# Patient Record
Sex: FEMALE | Race: WHITE | NOT HISPANIC OR LATINO | Employment: FULL TIME | URBAN - METROPOLITAN AREA
[De-identification: names, ages, dates, MRNs, and addresses within clinical notes are randomized per-mention and may not be internally consistent; named-entity substitution may affect disease eponyms.]

---

## 2017-01-27 ENCOUNTER — TELEPHONE (OUTPATIENT)
Dept: GENERAL RADIOLOGY | Facility: CLINIC | Age: 52
End: 2017-01-27

## 2017-01-27 DIAGNOSIS — K21.9 GASTROESOPHAGEAL REFLUX DISEASE WITHOUT ESOPHAGITIS: Primary | ICD-10-CM

## 2017-01-31 DIAGNOSIS — N95.1 SYMPTOMATIC MENOPAUSAL OR FEMALE CLIMACTERIC STATES: Primary | ICD-10-CM

## 2017-01-31 RX ORDER — ESTRADIOL 0.5 MG/1
0.5 TABLET ORAL DAILY
Qty: 90 TABLET | Refills: 0 | Status: SHIPPED | OUTPATIENT
Start: 2017-01-31 | End: 2017-05-10

## 2017-01-31 NOTE — TELEPHONE ENCOUNTER
Notice of denial of RABEprazole (ACIPHEX) 20 MG tablet prescription drug coverage.        [employer] plan covers this drug when the patient meets on of these conditions:   -There are no preferred formulary alternative for their condition.  -They need a form of the drug that is not on the formulary.  -They have clinical condition for which there is no formulary alternative.  -They tried the required number of preferred formulary alternative on their formulary first and the alternatives did not work for them or they could not take them.    Formulary alternatives are omeprazole, lansoprazole, omeprazole/sodium bicarbonate.

## 2017-01-31 NOTE — TELEPHONE ENCOUNTER
estradiol (ESTRACE) 0.5 MG tablet      Last Written Prescription Date: 7/5/2016  Last Fill Quantity: 90 tablet, # refills: 1  Last Office Visit with [facility], [facility] or [facility] prescribing provider: 9/13/2016       BP Readings from Last 3 Encounters:   09/13/16 118/80   06/08/16 114/78   03/28/16 120/60     Date of last Breast Exam: 2/1/2016

## 2017-01-31 NOTE — TELEPHONE ENCOUNTER
Prescription approved per [facility] Ref[place].  Tessa Langford, RN, BSN  [facility]

## 2017-02-03 NOTE — TELEPHONE ENCOUNTER
I called Brea and she is refusing to try lansoprazole. She says she has been through this before and nothing else works. She was very firm and did not seem open to trying anything else. Thu Santos R.N.

## 2017-02-06 DIAGNOSIS — E78.5 HYPERLIPIDEMIA LDL GOAL <130: Primary | ICD-10-CM

## 2017-02-06 NOTE — TELEPHONE ENCOUNTER
simvastatin (ZOCOR) 40 MG tablet     Last Written Prescription Date: 2/23/2016  Last Fill Quantity: 90 tablet, # refills: 2  Last Office Visit with [facility], [facility] or [facility] prescribing provider: 9/13/2016       CHOL      199   9/13/2016  HDL       42   9/13/2016  LDL      107   9/13/2016  TRIG      248   9/13/2016  CHOLHDLRATIO      4.7   10/7/2014

## 2017-02-07 NOTE — TELEPHONE ENCOUNTER
Please see of we can get the Aciphex PA done. Patient has tried and failed Omeprazole,Protonix, and Lanzoprazole    Karen Weiler, MD

## 2017-02-08 RX ORDER — SIMVASTATIN 40 MG
40 TABLET ORAL AT BEDTIME
Qty: 90 TABLET | Refills: 2 | Status: SHIPPED | OUTPATIENT
Start: 2017-02-08 | End: 2017-12-13

## 2017-02-08 NOTE — TELEPHONE ENCOUNTER
Prescription approved per [facility] Refill Protocol.  Meets LDL goal < 130.  Thu Santos R.N.

## 2017-02-20 NOTE — TELEPHONE ENCOUNTER
Notice of denial of 2nd request for RABEprazole (ACIPHEX) 20 MG tablet prescription drug coverage.  Therapeutic failure of omeprazole, protonix, and lanzoprazole were noted on the prior authorization request.

## 2017-03-03 ENCOUNTER — HEALTH MAINTENANCE LETTER (OUTPATIENT)
Age: 52
End: 2017-03-03

## 2017-05-10 DIAGNOSIS — E78.5 HYPERLIPIDEMIA LDL GOAL <130: ICD-10-CM

## 2017-05-10 DIAGNOSIS — N95.1 SYMPTOMATIC MENOPAUSAL OR FEMALE CLIMACTERIC STATES: ICD-10-CM

## 2017-05-10 NOTE — TELEPHONE ENCOUNTER
estradiol (ESTRACE) 0.5 MG tablet      Last Written Prescription Date: 1/31/2017  Last Fill Quantity: 90 tablet, # refills: 0  Last Office Visit with [facility], [facility] or [facility] prescribing provider: 9/13/2016       BP Readings from Last 3 Encounters:   09/13/16 118/80   06/08/16 114/78   03/28/16 120/60     Date of last Breast Exam: 2/1/2016      simvastatin (ZOCOR) 40 MG tablet  This refill has been requested too soon.  Please decline and close.

## 2017-05-11 RX ORDER — SIMVASTATIN 40 MG
TABLET ORAL
Qty: 90 TABLET | Refills: 1 | OUTPATIENT
Start: 2017-05-11

## 2017-05-11 RX ORDER — ESTRADIOL 0.5 MG/1
0.5 TABLET ORAL DAILY
Qty: 30 TABLET | Refills: 0 | Status: SHIPPED | OUTPATIENT
Start: 2017-05-11 | End: 2017-06-13

## 2017-06-13 DIAGNOSIS — N95.1 SYMPTOMATIC MENOPAUSAL OR FEMALE CLIMACTERIC STATES: ICD-10-CM

## 2017-06-13 RX ORDER — ESTRADIOL 0.5 MG/1
TABLET ORAL
Qty: 30 TABLET | Refills: 0 | Status: SHIPPED | OUTPATIENT
Start: 2017-06-13 | End: 2017-07-18

## 2017-06-13 NOTE — TELEPHONE ENCOUNTER
estradiol (ESTRACE) 0.5 MG tablet      Last Written Prescription Date: 5/11/2017  Last Fill Quantity: 30 tablet, # refills: 0  Last Office Visit with [facility], [facility] or [facility] prescribing provider: 9/13/2016       BP Readings from Last 3 Encounters:   09/13/16 118/80   06/08/16 114/78   03/28/16 120/60     Date of last Breast Exam: 2/1/2016

## 2017-06-13 NOTE — TELEPHONE ENCOUNTER
30 day supply given.  Patient is due for an OV.  Please call and assist with scheduling appointment prior to next refill   Shelby Devi RN  [employer]

## 2017-06-26 ASSESSMENT — PATIENT HEALTH QUESTIONNAIRE - PHQ9
SUM OF ALL RESPONSES TO PHQ QUESTIONS 1-9: 9
10. IF YOU CHECKED OFF ANY PROBLEMS, HOW DIFFICULT HAVE THESE PROBLEMS MADE IT FOR YOU TO DO YOUR WORK, TAKE CARE OF THINGS AT HOME, OR GET ALONG WITH OTHER PEOPLE: VERY DIFFICULT
SUM OF ALL RESPONSES TO PHQ QUESTIONS 1-9: 9

## 2017-06-27 ASSESSMENT — PATIENT HEALTH QUESTIONNAIRE - PHQ9: SUM OF ALL RESPONSES TO PHQ QUESTIONS 1-9: 9
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
## 2017-06-28 ENCOUNTER — OFFICE VISIT (OUTPATIENT)
Dept: FAMILY MEDICINE | Facility: CLINIC | Age: 52
End: 2017-06-28
Payer: COMMERCIAL

## 2017-06-28 VITALS
OXYGEN SATURATION: 95 % | HEIGHT: 67 IN | DIASTOLIC BLOOD PRESSURE: 72 MMHG | HEART RATE: 85 BPM | BODY MASS INDEX: 35.79 KG/M2 | TEMPERATURE: 98 F | SYSTOLIC BLOOD PRESSURE: 130 MMHG | WEIGHT: 228 LBS

## 2017-06-28 DIAGNOSIS — Z12.4 SCREENING FOR MALIGNANT NEOPLASM OF CERVIX: ICD-10-CM

## 2017-06-28 DIAGNOSIS — K21.9 GASTROESOPHAGEAL REFLUX DISEASE WITHOUT ESOPHAGITIS: ICD-10-CM

## 2017-06-28 DIAGNOSIS — R07.89 OTHER CHEST PAIN: ICD-10-CM

## 2017-06-28 DIAGNOSIS — Z12.31 ENCOUNTER FOR SCREENING MAMMOGRAM FOR BREAST CANCER: ICD-10-CM

## 2017-06-28 DIAGNOSIS — Z11.59 NEED FOR HEPATITIS C SCREENING TEST: ICD-10-CM

## 2017-06-28 DIAGNOSIS — E78.5 HYPERLIPIDEMIA LDL GOAL <130: ICD-10-CM

## 2017-06-28 DIAGNOSIS — Z00.00 ROUTINE GENERAL MEDICAL EXAMINATION AT A HEALTH CARE FACILITY: Primary | ICD-10-CM

## 2017-06-28 DIAGNOSIS — F33.0 MILD RECURRENT MAJOR DEPRESSION (H): ICD-10-CM

## 2017-06-28 LAB
ALBUMIN SERPL-MCNC: 3.9 G/DL (ref 3.4–5)
ALP SERPL-CCNC: 73 U/L (ref 40–150)
ALT SERPL W P-5'-P-CCNC: 25 U/L (ref 0–50)
ANION GAP SERPL CALCULATED.3IONS-SCNC: 8 MMOL/L (ref 3–14)
AST SERPL W P-5'-P-CCNC: 18 U/L (ref 0–45)
BILIRUB SERPL-MCNC: 0.4 MG/DL (ref 0.2–1.3)
BUN SERPL-MCNC: 6 MG/DL (ref 7–30)
CALCIUM SERPL-MCNC: 9.1 MG/DL (ref 8.5–10.1)
CHLORIDE SERPL-SCNC: 110 MMOL/L (ref 94–109)
CHOLEST SERPL-MCNC: 194 MG/DL
CO2 SERPL-SCNC: 24 MMOL/L (ref 20–32)
CREAT SERPL-MCNC: 0.68 MG/DL (ref 0.52–1.04)
ERYTHROCYTE [DISTWIDTH] IN BLOOD BY AUTOMATED COUNT: 13 % (ref 10–15)
GFR SERPL CREATININE-BSD FRML MDRD: ABNORMAL ML/MIN/1.7M2
GLUCOSE SERPL-MCNC: 83 MG/DL (ref 70–99)
HCT VFR BLD AUTO: 47.8 % (ref 35–47)
HDLC SERPL-MCNC: 50 MG/DL
HGB BLD-MCNC: 16.3 G/DL (ref 11.7–15.7)
LDLC SERPL CALC-MCNC: 117 MG/DL
MCH RBC QN AUTO: 32.5 PG (ref 26.5–33)
MCHC RBC AUTO-ENTMCNC: 34.1 G/DL (ref 31.5–36.5)
MCV RBC AUTO: 95 FL (ref 78–100)
NONHDLC SERPL-MCNC: 144 MG/DL
PLATELET # BLD AUTO: 266 10E9/L (ref 150–450)
POTASSIUM SERPL-SCNC: 4 MMOL/L (ref 3.4–5.3)
PROT SERPL-MCNC: 7.5 G/DL (ref 6.8–8.8)
RBC # BLD AUTO: 5.02 10E12/L (ref 3.8–5.2)
SODIUM SERPL-SCNC: 142 MMOL/L (ref 133–144)
TRIGL SERPL-MCNC: 136 MG/DL
TSH SERPL DL<=0.005 MIU/L-ACNC: 0.78 MU/L (ref 0.4–4)
WBC # BLD AUTO: 7.3 10E9/L (ref 4–11)

## 2017-06-28 PROCEDURE — 99213 OFFICE O/P EST LOW 20 MIN: CPT | Mod: 25 | Performed by: FAMILY MEDICINE

## 2017-06-28 PROCEDURE — 80061 LIPID PANEL: CPT | Performed by: FAMILY MEDICINE

## 2017-06-28 PROCEDURE — 86803 HEPATITIS C AB TEST: CPT | Performed by: FAMILY MEDICINE

## 2017-06-28 PROCEDURE — G0476 HPV COMBO ASSAY CA SCREEN: HCPCS | Performed by: FAMILY MEDICINE

## 2017-06-28 PROCEDURE — 99396 PREV VISIT EST AGE 40-64: CPT | Mod: 25 | Performed by: FAMILY MEDICINE

## 2017-06-28 PROCEDURE — 93000 ELECTROCARDIOGRAM COMPLETE: CPT | Performed by: FAMILY MEDICINE

## 2017-06-28 PROCEDURE — G0145 SCR C/V CYTO,THINLAYER,RESCR: HCPCS | Performed by: FAMILY MEDICINE

## 2017-06-28 PROCEDURE — 36415 COLL VENOUS BLD VENIPUNCTURE: CPT | Performed by: FAMILY MEDICINE

## 2017-06-28 PROCEDURE — 84443 ASSAY THYROID STIM HORMONE: CPT | Performed by: FAMILY MEDICINE

## 2017-06-28 PROCEDURE — 85027 COMPLETE CBC AUTOMATED: CPT | Performed by: FAMILY MEDICINE

## 2017-06-28 PROCEDURE — 80053 COMPREHEN METABOLIC PANEL: CPT | Performed by: FAMILY MEDICINE

## 2017-06-28 RX ORDER — HYDROXYZINE PAMOATE 25 MG/1
CAPSULE ORAL
Refills: 1 | Status: ON HOLD | COMMUNITY
Start: 2017-04-13 | End: 2017-10-22

## 2017-06-28 RX ORDER — BUSPIRONE HYDROCHLORIDE 10 MG/1
TABLET ORAL
Refills: 1 | Status: ON HOLD | COMMUNITY
Start: 2017-06-13 | End: 2017-10-22

## 2017-06-28 RX ORDER — LURASIDONE HYDROCHLORIDE 60 MG/1
TABLET, FILM COATED ORAL
Refills: 1 | COMMUNITY
Start: 2017-05-17 | End: 2023-05-01 | Stop reason: DRUGHIGH

## 2017-06-28 RX ORDER — LAMOTRIGINE 150 MG/1
TABLET ORAL
Refills: 1 | COMMUNITY
Start: 2017-06-13 | End: 2024-06-14

## 2017-06-28 RX ORDER — RABEPRAZOLE SODIUM 20 MG/1
20 TABLET, DELAYED RELEASE ORAL DAILY
Qty: 90 TABLET | Refills: 1 | Status: SHIPPED | OUTPATIENT
Start: 2017-06-28 | End: 2017-12-19 | Stop reason: ALTCHOICE

## 2017-06-28 RX ORDER — LORAZEPAM 0.5 MG/1
TABLET ORAL
Refills: 1 | COMMUNITY
Start: 2017-02-16 | End: 2022-03-11

## 2017-06-28 NOTE — MR AVS SNAPSHOT
After Visit Summary   6/28/2017    Brea Aquino    MRN: [id number]           Patient Information     Date Of Birth          1965        Visit Information        Provider Department      6/28/2017 11:20 AM Weiler, Karen, MD [facility]        Today's Diagnoses     Routine general medical examination at a health care facility    -  1    Screening for malignant neoplasm of cervix        Need for hepatitis C screening test        Gastroesophageal reflux disease without esophagitis        Other chest pain        Hyperlipidemia LDL goal <130        Mild recurrent major depression (H)        Encounter for screening mammogram for breast cancer           Follow-ups after your visit        Your next 10 appointments already scheduled     Jul 19, 2017 11:15 AM CDT   MA SCREENING DIGITAL BILATERAL with [id number]   [facility]    [address] [place] [id number] [phone]           Do not use any powder, lotion or deodorant under your arms or on your breast. If you do, we will ask you to remove it before your exam.  Wear comfortable, two-piece clothing.  If you have any allergies, tell your care team.  Bring any previous mammograms from other facilities or have them mailed to the breast center.              Who to contact     If you have questions or need follow up information about today's clinic visit or your schedule please contact [facility] [place] directly at [phone].  Normal or non-critical lab and imaging results will be communicated to you by MyChart, letter or phone within 4 business days after the clinic has received the results. If you do not hear from us within 7 days, please contact the clinic through MyChart or phone. If you have a critical or abnormal lab result, we will notify you by phone as soon as possible.  Submit refill requests through [employer] or call your pharmacy and they will forward the refill  "request to us. Please allow 3 business days for your refill to be completed.          Additional Information About Your Visit        [employer] Information     [employer] gives you secure access to your electronic health record. If you see a primary care provider, you can also send messages to your care team and make appointments. If you have questions, please call your primary care clinic.  If you do not have a primary care provider, please call [phone] and they will assist you.        Care EveryWhere ID     This is your Care EveryWhere ID. This could be used by other organizations to access your [facility] medical records  [id number]        Your Vitals Were     Pulse Temperature Height Last Period Pulse Oximetry BMI (Body Mass Index)    85 98  F (36.7  C) (Oral) 5' 7\" (1.702 m) 11/22/2006 95% 35.71 kg/m2       Blood Pressure from Last 3 Encounters:   06/28/17 130/72   09/13/16 118/80   06/08/16 114/78    Weight from Last 3 Encounters:   06/28/17 228 lb (103.4 kg)   09/13/16 239 lb (108.4 kg)   06/08/16 235 lb (106.6 kg)              We Performed the Following     CBC with platelets     Comprehensive metabolic panel (BMP + Alb, Alk Phos, ALT, AST, Total. Bili, TP)     DEPRESSION ACTION PLAN (DAP)     EKG 12-lead complete w/read - Clinics     Hepatitis C Screen Reflex to HCV RNA Quant and Genotype     HPV High Risk Types DNA Cervical     Lipid panel reflex to direct LDL     Pap imaged thin layer screen with HPV - recommended age 30 - 65 years (select HPV order below)     Pap imaged thin layer screen with HPV - recommended age 30 - 65 years (select HPV order below)     TSH with free T4 reflex          Today's Medication Changes          These changes are accurate as of: 6/28/17 11:59 PM.  If you have any questions, ask your nurse or doctor.               These medicines have changed or have updated prescriptions.        Dose/Directions    LATUDA 60 MG Tabs tablet   This may have changed:  Another medication with the " same name was removed. Continue taking this medication, and follow the directions you see here.   Generic drug:  lurasidone   Changed by:  Weiler, Karen, MD        TAKE ONE TABLET BY MOUTH EVERY MORNING.   Refills:  1            Where to get your medicines      These medications were sent to [id number] [place] AT [facility] OF [facility] [address], [facility] [address]    Hours:  24-hours Phone:  [phone]     RABEprazole 20 MG EC tablet                Primary Care Provider Office Phone # Fax #    Karen Weiler, [phone]       [facility] [address] [facility] [address]        Equal Access to Services     FATEMEH DIAZ : Hadii aad ku hadasho Soomaali, waaxda luqadaha, qaybta kaalmada adeegyada, waxay idiin hayprabhjotn jose walls. So [facility] [phone].    ATENCIÓN: Si habla español, tiene a singleton disposición servicios gratuitos de asistencia lingüística. [facility] [phone].    We comply with applicable federal civil rights laws and [place] laws. We do not discriminate on the basis of race, color, national origin, age, disability sex, sexual orientation or gender identity.            Thank you!     Thank you for choosing [facility]  for your care. Our goal is always to provide you with excellent care. Hearing back from our patients is one way we can continue to improve our services. Please take a few minutes to complete the written survey that you may receive in the mail after your visit with us. Thank you!             Your Updated Medication List - Protect others around you: Learn how to safely use, store and throw away your medicines at www.disposemymeds.org.          This list is accurate as of: 6/28/17 11:59 PM.  Always use your most recent med list.                   Brand Name Dispense Instructions for use Diagnosis    busPIRone 10 MG tablet    BUSPAR     TK 1/2 T PO BID FOR SEVEN DAYS THEN  INCREASE TO 1 T BID        estradiol 0.5 MG tablet    ESTRACE    30 tablet    TAKE 1 TABLET (0.5 MG) BY MOUTH DAILY MUST BE SEEN IN CLINIC FOR FURTHER REFILLS    Symptomatic menopausal or female climacteric states       * hydrOXYzine 10 MG tablet    ATARAX     Take 10 mg by mouth 3 times daily as needed        * HYDROXYZINE HCL PO      Take 25 mg by mouth At Bedtime        hydrOXYzine 25 MG capsule    VISTARIL     TAKE 1-2 CAPS BY MOUTH NIGHTLY AT BEDTIME        lamoTRIgine 150 MG tablet    LaMICtal     TK 1 T PO  QAM AND TK 1 QPM        LATUDA 60 MG Tabs tablet   Generic drug:  lurasidone      TAKE ONE TABLET BY MOUTH EVERY MORNING.        LORazepam 0.5 MG tablet    ATIVAN     TAKE ONE TO TWO TABLETS BY MOUTH ONCE DAILY AS NEEDED FOR PANIC.        * order for DME     1 Device    TENS Unit    Neck pain       * order for DME     1 Device    1 TENS unit    Cervicalgia, Upper back pain, Tension-type headaches       * order for DME     1 Device    TENS unit including supplies    Cervicalgia       RABEprazole 20 MG EC tablet    ACIPHEX    90 tablet    Take 1 tablet (20 mg) by mouth daily    Gastroesophageal reflux disease without esophagitis       simvastatin 40 MG tablet    ZOCOR    90 tablet    Take 1 tablet (40 mg) by mouth At Bedtime    Hyperlipidemia LDL goal <130       * Notice:  This list has 5 medication(s) that are the same as other medications prescribed for you. Read the directions carefully, and ask your doctor or other care provider to review them with you.

## 2017-06-28 NOTE — NURSING NOTE
"Chief Complaint   Patient presents with     Physical       Initial BP (!) 138/98  Pulse 85  Temp 98  F (36.7  C) (Oral)  Ht 5' 7\" (1.702 m)  Wt 228 lb (103.4 kg)  LMP 11/22/2006  SpO2 95%  BMI 35.71 kg/m2 Estimated body mass index is 35.71 kg/(m^2) as calculated from the following:    Height as of this encounter: 5' 7\" (1.702 m).    Weight as of this encounter: 228 lb (103.4 kg).  Medication Reconciliation: complete   Jazzy Lara Certified Medical Assistant      "

## 2017-06-29 LAB — HCV AB SERPL QL IA: NORMAL

## 2017-06-30 NOTE — PROGRESS NOTES
Deamami Guidry,    Dr. Weiler has reviewed your recent labs:  -Liver and gallbladder tests are normal. (ALT,AST, Alk phos, bilirubin), kidney function is normal (Cr, GFR), Sodium is normal, Potassium is normal, Calcium is normal, Glucose is normal (diabetes screening test).   -Cholesterol levels are at your goal levels.  ADVISE: Continuing your medication, a regular exercise program with at least 30 minutes of aerobic exercise 3-4 days/week (45 minutes 4-6 days/week if weight loss needed), and a low saturated fat/low carbohydrate diet are helpful to maintain this.  Rechecking your fasting cholesterol panel in 12 months is recommended.  -TSH (thyroid stimulating hormone) level is normal which indicates normal thyroid function.  -Normal white blood cell count and platelet count  -Hepatitis C antibody screen test shows no signs of a previous hepatitis C infection.    If you have further questions about the interpretation of your labs, labtestsonline.org is a good website to check out for further information.    Please contact the clinic if you have additional questions.  Thank you.    Sincerely,    Beth Lui PA-C  Physician extender for Dr. Karen Weiler

## 2017-07-03 LAB
COPATH REPORT: NORMAL
PAP: NORMAL

## 2017-07-06 LAB
FINAL DIAGNOSIS: NORMAL
FINAL DIAGNOSIS: NORMAL
HPV HR 12 DNA CVX QL NAA+PROBE: NEGATIVE
HPV HR 12 DNA CVX QL NAA+PROBE: NEGATIVE
HPV16 DNA SPEC QL NAA+PROBE: NEGATIVE
HPV16 DNA SPEC QL NAA+PROBE: NEGATIVE
HPV18 DNA SPEC QL NAA+PROBE: NEGATIVE
HPV18 DNA SPEC QL NAA+PROBE: NEGATIVE
SPECIMEN DESCRIPTION: NORMAL
SPECIMEN DESCRIPTION: NORMAL

## 2017-07-18 DIAGNOSIS — N95.1 SYMPTOMATIC MENOPAUSAL OR FEMALE CLIMACTERIC STATES: ICD-10-CM

## 2017-07-18 NOTE — TELEPHONE ENCOUNTER
estradiol (ESTRACE) 0.5 MG tablet      Last Written Prescription Date: 6/13/2017  Last Fill Quantity: 30 tablet, # refills: 0  Last Office Visit with [facility], [facility] or [facility] prescribing provider: 6/28/2017       BP Readings from Last 3 Encounters:   06/28/17 130/72   09/13/16 118/80   06/08/16 114/78     Date of last Breast Exam: 2/1/2016-Scheduled for 7/19/2017

## 2017-07-19 ENCOUNTER — RADIANT APPOINTMENT (OUTPATIENT)
Dept: MAMMOGRAPHY | Facility: CLINIC | Age: 52
End: 2017-07-19
Attending: FAMILY MEDICINE
Payer: COMMERCIAL

## 2017-07-19 DIAGNOSIS — Z12.31 ENCOUNTER FOR SCREENING MAMMOGRAM FOR BREAST CANCER: ICD-10-CM

## 2017-07-19 PROCEDURE — G0202 SCR MAMMO BI INCL CAD: HCPCS | Mod: TC

## 2017-07-20 NOTE — PROGRESS NOTES
Dear Brea,    Your recent mammogram was normal. Annual mammograms are recommended, so you will be due again in July 2018.  You may receive a separate result letter in the mail from the imaging center.    Please contact the clinic if you have additional questions.  Thank you.    Sincerely,    Beth Lui PA-C  Physician extender for Dr. Karen Weiler

## 2017-07-21 RX ORDER — ESTRADIOL 0.5 MG/1
TABLET ORAL
Qty: 90 TABLET | Refills: 1 | Status: SHIPPED | OUTPATIENT
Start: 2017-07-21 | End: 2018-03-09

## 2017-07-21 NOTE — TELEPHONE ENCOUNTER
appt up to date.  BP at goal  Refill [place] per [facility] [place]    Yisel Salazar RN

## 2017-10-22 ENCOUNTER — HOSPITAL ENCOUNTER (OUTPATIENT)
Facility: CLINIC | Age: 52
Setting detail: OBSERVATION
Discharge: HOME | End: 2017-10-23
Attending: EMERGENCY MEDICINE | Admitting: INTERNAL MEDICINE
Payer: COMMERCIAL

## 2017-10-22 DIAGNOSIS — N61.0 CELLULITIS OF RIGHT BREAST: ICD-10-CM

## 2017-10-22 DIAGNOSIS — N61.1 ABSCESS OF RIGHT BREAST: ICD-10-CM

## 2017-10-22 PROBLEM — L03.90 CELLULITIS: Status: ACTIVE | Noted: 2017-10-22

## 2017-10-22 LAB
ANION GAP SERPL CALCULATED.3IONS-SCNC: 9 MMOL/L (ref 3–14)
BASOPHILS # BLD AUTO: 0.1 10E9/L (ref 0–0.2)
BASOPHILS NFR BLD AUTO: 0.4 %
BUN SERPL-MCNC: 6 MG/DL (ref 7–30)
CALCIUM SERPL-MCNC: 8.7 MG/DL (ref 8.5–10.1)
CHLORIDE SERPL-SCNC: 105 MMOL/L (ref 94–109)
CO2 SERPL-SCNC: 23 MMOL/L (ref 20–32)
CREAT SERPL-MCNC: 0.64 MG/DL (ref 0.52–1.04)
DIFFERENTIAL METHOD BLD: ABNORMAL
EOSINOPHIL # BLD AUTO: 0.2 10E9/L (ref 0–0.7)
EOSINOPHIL NFR BLD AUTO: 1.4 %
ERYTHROCYTE [DISTWIDTH] IN BLOOD BY AUTOMATED COUNT: 13 % (ref 10–15)
GFR SERPL CREATININE-BSD FRML MDRD: >90 ML/MIN/1.7M2
GLUCOSE SERPL-MCNC: 89 MG/DL (ref 70–99)
HCT VFR BLD AUTO: 45.6 % (ref 35–47)
HGB BLD-MCNC: 15.5 G/DL (ref 11.7–15.7)
IMM GRANULOCYTES # BLD: 0.1 10E9/L (ref 0–0.4)
IMM GRANULOCYTES NFR BLD: 0.4 %
LYMPHOCYTES # BLD AUTO: 4.6 10E9/L (ref 0.8–5.3)
LYMPHOCYTES NFR BLD AUTO: 30.4 %
MAGNESIUM SERPL-MCNC: 1.9 MG/DL (ref 1.6–2.3)
MCH RBC QN AUTO: 32.4 PG (ref 26.5–33)
MCHC RBC AUTO-ENTMCNC: 34 G/DL (ref 31.5–36.5)
MCV RBC AUTO: 95 FL (ref 78–100)
MONOCYTES # BLD AUTO: 1.1 10E9/L (ref 0–1.3)
MONOCYTES NFR BLD AUTO: 7.1 %
NEUTROPHILS # BLD AUTO: 9 10E9/L (ref 1.6–8.3)
NEUTROPHILS NFR BLD AUTO: 60.3 %
NRBC # BLD AUTO: 0 10*3/UL
NRBC BLD AUTO-RTO: 0 /100
PLATELET # BLD AUTO: 311 10E9/L (ref 150–450)
POTASSIUM SERPL-SCNC: 3.2 MMOL/L (ref 3.4–5.3)
POTASSIUM SERPL-SCNC: 4 MMOL/L (ref 3.4–5.3)
RBC # BLD AUTO: 4.78 10E12/L (ref 3.8–5.2)
SODIUM SERPL-SCNC: 137 MMOL/L (ref 133–144)
WBC # BLD AUTO: 15 10E9/L (ref 4–11)

## 2017-10-22 PROCEDURE — 25000125 ZZHC RX 250: Performed by: PHYSICIAN ASSISTANT

## 2017-10-22 PROCEDURE — 87040 BLOOD CULTURE FOR BACTERIA: CPT | Performed by: EMERGENCY MEDICINE

## 2017-10-22 PROCEDURE — 36415 COLL VENOUS BLD VENIPUNCTURE: CPT | Performed by: PHYSICIAN ASSISTANT

## 2017-10-22 PROCEDURE — 80048 BASIC METABOLIC PNL TOTAL CA: CPT | Performed by: EMERGENCY MEDICINE

## 2017-10-22 PROCEDURE — G0378 HOSPITAL OBSERVATION PER HR: HCPCS

## 2017-10-22 PROCEDURE — 99285 EMERGENCY DEPT VISIT HI MDM: CPT | Mod: 25

## 2017-10-22 PROCEDURE — 83735 ASSAY OF MAGNESIUM: CPT | Performed by: EMERGENCY MEDICINE

## 2017-10-22 PROCEDURE — 85025 COMPLETE CBC W/AUTO DIFF WBC: CPT | Performed by: EMERGENCY MEDICINE

## 2017-10-22 PROCEDURE — 76882 US LMTD JT/FCL EVL NVASC XTR: CPT

## 2017-10-22 PROCEDURE — 25000132 ZZH RX MED GY IP 250 OP 250 PS 637: Performed by: INTERNAL MEDICINE

## 2017-10-22 PROCEDURE — 25000128 H RX IP 250 OP 636: Performed by: INTERNAL MEDICINE

## 2017-10-22 PROCEDURE — 36415 COLL VENOUS BLD VENIPUNCTURE: CPT | Performed by: EMERGENCY MEDICINE

## 2017-10-22 PROCEDURE — 25000132 ZZH RX MED GY IP 250 OP 250 PS 637: Performed by: PHYSICIAN ASSISTANT

## 2017-10-22 PROCEDURE — 10060 I&D ABSCESS SIMPLE/SINGLE: CPT

## 2017-10-22 PROCEDURE — 96376 TX/PRO/DX INJ SAME DRUG ADON: CPT

## 2017-10-22 PROCEDURE — 84132 ASSAY OF SERUM POTASSIUM: CPT | Performed by: PHYSICIAN ASSISTANT

## 2017-10-22 PROCEDURE — 40000275 ZZH STATISTIC RCP TIME EA 10 MIN

## 2017-10-22 PROCEDURE — 99218 ZZC INITIAL OBSERVATION CARE,LEVL I: CPT | Performed by: INTERNAL MEDICINE

## 2017-10-22 PROCEDURE — 94640 AIRWAY INHALATION TREATMENT: CPT

## 2017-10-22 PROCEDURE — 99203 OFFICE O/P NEW LOW 30 MIN: CPT | Performed by: SURGERY

## 2017-10-22 PROCEDURE — 99207 ZZC CDG-HISTORY COMP: MEETS EXP. PROBLEM FOCUSED-DOWN CODED LACK OF ROS: CPT | Performed by: INTERNAL MEDICINE

## 2017-10-22 PROCEDURE — 96374 THER/PROPH/DIAG INJ IV PUSH: CPT

## 2017-10-22 RX ORDER — IPRATROPIUM BROMIDE AND ALBUTEROL SULFATE 2.5; .5 MG/3ML; MG/3ML
3 SOLUTION RESPIRATORY (INHALATION) ONCE
Status: COMPLETED | OUTPATIENT
Start: 2017-10-22 | End: 2017-10-22

## 2017-10-22 RX ORDER — POTASSIUM CHLORIDE 1500 MG/1
20-40 TABLET, EXTENDED RELEASE ORAL
Status: DISCONTINUED | OUTPATIENT
Start: 2017-10-22 | End: 2017-10-23 | Stop reason: HOSPADM

## 2017-10-22 RX ORDER — LURASIDONE HYDROCHLORIDE 20 MG/1
60 TABLET, FILM COATED ORAL AT BEDTIME
Status: DISCONTINUED | OUTPATIENT
Start: 2017-10-22 | End: 2017-10-23 | Stop reason: HOSPADM

## 2017-10-22 RX ORDER — HYDROXYZINE HYDROCHLORIDE 10 MG/1
10 TABLET, FILM COATED ORAL 3 TIMES DAILY PRN
Status: DISCONTINUED | OUTPATIENT
Start: 2017-10-22 | End: 2017-10-23 | Stop reason: HOSPADM

## 2017-10-22 RX ORDER — LIDOCAINE HYDROCHLORIDE AND EPINEPHRINE 10; 10 MG/ML; UG/ML
INJECTION, SOLUTION INFILTRATION; PERINEURAL
Status: DISCONTINUED
Start: 2017-10-22 | End: 2017-10-22 | Stop reason: HOSPADM

## 2017-10-22 RX ORDER — ACETAMINOPHEN 325 MG/1
650 TABLET ORAL EVERY 4 HOURS PRN
Status: DISCONTINUED | OUTPATIENT
Start: 2017-10-22 | End: 2017-10-23 | Stop reason: HOSPADM

## 2017-10-22 RX ORDER — POTASSIUM CHLORIDE 7.45 MG/ML
10 INJECTION INTRAVENOUS
Status: DISCONTINUED | OUTPATIENT
Start: 2017-10-22 | End: 2017-10-23 | Stop reason: HOSPADM

## 2017-10-22 RX ORDER — ESTRADIOL 0.5 MG/1
0.5 TABLET ORAL DAILY
Status: DISCONTINUED | OUTPATIENT
Start: 2017-10-22 | End: 2017-10-23 | Stop reason: HOSPADM

## 2017-10-22 RX ORDER — LAMOTRIGINE 150 MG/1
150 TABLET ORAL 2 TIMES DAILY
Status: DISCONTINUED | OUTPATIENT
Start: 2017-10-22 | End: 2017-10-23 | Stop reason: HOSPADM

## 2017-10-22 RX ORDER — POTASSIUM CHLORIDE 29.8 MG/ML
20 INJECTION INTRAVENOUS
Status: DISCONTINUED | OUTPATIENT
Start: 2017-10-22 | End: 2017-10-23 | Stop reason: HOSPADM

## 2017-10-22 RX ORDER — ONDANSETRON 4 MG/1
4 TABLET, ORALLY DISINTEGRATING ORAL EVERY 6 HOURS PRN
Status: DISCONTINUED | OUTPATIENT
Start: 2017-10-22 | End: 2017-10-23 | Stop reason: HOSPADM

## 2017-10-22 RX ORDER — CEFAZOLIN SODIUM 2 G/100ML
2 INJECTION, SOLUTION INTRAVENOUS ONCE
Status: COMPLETED | OUTPATIENT
Start: 2017-10-22 | End: 2017-10-22

## 2017-10-22 RX ORDER — ONDANSETRON 2 MG/ML
4 INJECTION INTRAMUSCULAR; INTRAVENOUS EVERY 6 HOURS PRN
Status: DISCONTINUED | OUTPATIENT
Start: 2017-10-22 | End: 2017-10-23 | Stop reason: HOSPADM

## 2017-10-22 RX ORDER — AMOXICILLIN 250 MG
1-2 CAPSULE ORAL 2 TIMES DAILY
Status: DISCONTINUED | OUTPATIENT
Start: 2017-10-22 | End: 2017-10-23 | Stop reason: HOSPADM

## 2017-10-22 RX ORDER — CEFAZOLIN SODIUM 2 G/100ML
2 INJECTION, SOLUTION INTRAVENOUS EVERY 8 HOURS
Status: DISCONTINUED | OUTPATIENT
Start: 2017-10-22 | End: 2017-10-23 | Stop reason: HOSPADM

## 2017-10-22 RX ORDER — POTASSIUM CL/LIDO/0.9 % NACL 10MEQ/0.1L
10 INTRAVENOUS SOLUTION, PIGGYBACK (ML) INTRAVENOUS
Status: DISCONTINUED | OUTPATIENT
Start: 2017-10-22 | End: 2017-10-23 | Stop reason: HOSPADM

## 2017-10-22 RX ORDER — HYDROCODONE BITARTRATE AND ACETAMINOPHEN 5; 325 MG/1; MG/1
1-2 TABLET ORAL EVERY 4 HOURS PRN
Status: DISCONTINUED | OUTPATIENT
Start: 2017-10-22 | End: 2017-10-23 | Stop reason: HOSPADM

## 2017-10-22 RX ORDER — PANTOPRAZOLE SODIUM 40 MG/1
40 TABLET, DELAYED RELEASE ORAL DAILY
Status: DISCONTINUED | OUTPATIENT
Start: 2017-10-22 | End: 2017-10-23 | Stop reason: HOSPADM

## 2017-10-22 RX ORDER — HYDROXYZINE HYDROCHLORIDE 25 MG/1
25 TABLET, FILM COATED ORAL
Status: DISCONTINUED | OUTPATIENT
Start: 2017-10-22 | End: 2017-10-23 | Stop reason: HOSPADM

## 2017-10-22 RX ORDER — NALOXONE HYDROCHLORIDE 0.4 MG/ML
.1-.4 INJECTION, SOLUTION INTRAMUSCULAR; INTRAVENOUS; SUBCUTANEOUS
Status: DISCONTINUED | OUTPATIENT
Start: 2017-10-22 | End: 2017-10-23 | Stop reason: HOSPADM

## 2017-10-22 RX ORDER — POTASSIUM CHLORIDE 1.5 G/1.58G
20-40 POWDER, FOR SOLUTION ORAL
Status: DISCONTINUED | OUTPATIENT
Start: 2017-10-22 | End: 2017-10-23 | Stop reason: HOSPADM

## 2017-10-22 RX ORDER — BUSPIRONE HYDROCHLORIDE 15 MG/1
15 TABLET ORAL 2 TIMES DAILY
Status: DISCONTINUED | OUTPATIENT
Start: 2017-10-22 | End: 2017-10-23 | Stop reason: HOSPADM

## 2017-10-22 RX ORDER — SIMVASTATIN 40 MG
40 TABLET ORAL AT BEDTIME
Status: DISCONTINUED | OUTPATIENT
Start: 2017-10-22 | End: 2017-10-23 | Stop reason: HOSPADM

## 2017-10-22 RX ORDER — POLYETHYLENE GLYCOL 3350 17 G/17G
17 POWDER, FOR SOLUTION ORAL DAILY PRN
Status: DISCONTINUED | OUTPATIENT
Start: 2017-10-22 | End: 2017-10-23 | Stop reason: HOSPADM

## 2017-10-22 RX ORDER — SODIUM CHLORIDE 9 MG/ML
INJECTION, SOLUTION INTRAVENOUS CONTINUOUS
Status: ACTIVE | OUTPATIENT
Start: 2017-10-22 | End: 2017-10-22

## 2017-10-22 RX ORDER — IPRATROPIUM BROMIDE AND ALBUTEROL SULFATE 2.5; .5 MG/3ML; MG/3ML
3 SOLUTION RESPIRATORY (INHALATION) EVERY 4 HOURS PRN
Status: DISCONTINUED | OUTPATIENT
Start: 2017-10-22 | End: 2017-10-23 | Stop reason: HOSPADM

## 2017-10-22 RX ADMIN — HYDROCODONE BITARTRATE AND ACETAMINOPHEN 1 TABLET: 5; 325 TABLET ORAL at 15:05

## 2017-10-22 RX ADMIN — CEFAZOLIN SODIUM 2 G: 2 INJECTION, SOLUTION INTRAVENOUS at 20:39

## 2017-10-22 RX ADMIN — CEFAZOLIN SODIUM 2 G: 2 INJECTION, SOLUTION INTRAVENOUS at 03:55

## 2017-10-22 RX ADMIN — BUSPIRONE HYDROCHLORIDE 15 MG: 15 TABLET ORAL at 15:40

## 2017-10-22 RX ADMIN — HYDROCODONE BITARTRATE AND ACETAMINOPHEN 1 TABLET: 5; 325 TABLET ORAL at 08:51

## 2017-10-22 RX ADMIN — ACETAMINOPHEN 650 MG: 325 TABLET, FILM COATED ORAL at 05:10

## 2017-10-22 RX ADMIN — CEFAZOLIN SODIUM 2 G: 2 INJECTION, SOLUTION INTRAVENOUS at 12:24

## 2017-10-22 RX ADMIN — POTASSIUM CHLORIDE 40 MEQ: 1500 TABLET, EXTENDED RELEASE ORAL at 05:52

## 2017-10-22 RX ADMIN — POTASSIUM CHLORIDE 20 MEQ: 1500 TABLET, EXTENDED RELEASE ORAL at 08:51

## 2017-10-22 RX ADMIN — BUSPIRONE HYDROCHLORIDE 15 MG: 15 TABLET ORAL at 20:41

## 2017-10-22 RX ADMIN — SIMVASTATIN 40 MG: 40 TABLET, FILM COATED ORAL at 20:42

## 2017-10-22 RX ADMIN — LAMOTRIGINE 150 MG: 150 TABLET ORAL at 15:40

## 2017-10-22 RX ADMIN — HYDROXYZINE HYDROCHLORIDE 25 MG: 25 TABLET ORAL at 20:48

## 2017-10-22 RX ADMIN — LAMOTRIGINE 150 MG: 150 TABLET ORAL at 20:41

## 2017-10-22 RX ADMIN — IPRATROPIUM BROMIDE AND ALBUTEROL SULFATE 3 ML: .5; 3 SOLUTION RESPIRATORY (INHALATION) at 12:36

## 2017-10-22 RX ADMIN — HYDROXYZINE HYDROCHLORIDE 10 MG: 10 TABLET ORAL at 15:40

## 2017-10-22 RX ADMIN — LURASIDONE HYDROCHLORIDE 60 MG: 20 TABLET, FILM COATED ORAL at 20:42

## 2017-10-22 RX ADMIN — HYDROCODONE BITARTRATE AND ACETAMINOPHEN 1 TABLET: 5; 325 TABLET ORAL at 20:47

## 2017-10-22 RX ADMIN — SODIUM CHLORIDE: 9 INJECTION, SOLUTION INTRAVENOUS at 05:48

## 2017-10-22 RX ADMIN — PANTOPRAZOLE SODIUM 40 MG: 40 TABLET, DELAYED RELEASE ORAL at 15:05

## 2017-10-22 ASSESSMENT — ENCOUNTER SYMPTOMS
FEVER: 0
COLOR CHANGE: 1

## 2017-10-22 NOTE — PLAN OF CARE
Problem: Patient Care Overview  Goal: Discharge Needs Assessment  PRIMARY DIAGNOSIS: SOFT TISSUE INFECTIONS-cellulitis-rt breast  OUTPATIENT/OBSERVATION GOALS TO BE MET BEFORE DISCHARGE:  1. Vitals sign stable or return to baseline: Yes      2. Tolerating oral antibiotics or has home infusion set up if applicable: No, IV ancef every day      3. Pain status: Improved-controlled with oral pain medications.      4. Return to near baseline physical activity: Yes      Discharge Planner Nurse   Safe discharge environment identified: Yes  Barriers to discharge: Not once medically cleared      Please review provider order for any additional goals.       Nurse to notify provider when observation goals have been met and patient is ready for discharge.          Patient is alert and oriented x4. VSS. Patient redness staying within outline on right breast. Patient started on IV ancef Q8 hours. Patient is up IND. K  3.2, potassium replaced per protocol. Recheck at noon. Will continue to monitor, assess, and offer supportive cares.

## 2017-10-22 NOTE — PLAN OF CARE
Problem: Patient Care Overview  Goal: Plan of Care/Patient Progress Review  PRIMARY DIAGNOSIS: SOFT TISSUE INFECTIONS-cellulitis-rt breast  OUTPATIENT/OBSERVATION GOALS TO BE MET BEFORE DISCHARGE:  1. Vitals sign stable or return to baseline: Yes      2. Tolerating oral antibiotics or has home infusion set up if applicable: No, IV ancef given in ED, and scheduled for 1200      3. Pain status: Improved-controlled with oral pain medications.      4. Return to near baseline physical activity: Yes     Discharge Planner Nurse   Safe discharge environment identified: Yes  Barriers to discharge: Not once medically cleared       Entered by: Evelia Marte 10/22/2017 6:02 AM     Please review provider order for any additional goals.      Nurse to notify provider when observation goals have been met and patient is ready for discharge.        Patient is alert and oriented x4. Patient does admit to having some cocktails prior to coming to the ED. VSS. Patient came in tonight d/t redness on rt breast. Patient reports the swelling started to get increasingly worse over the last 2 weeks. Yesterday pt woke up with a red line above rt nipple. Throughout the day the line expanded. Patient started on IV ancef for cellulitis. Area cleaned out in ED. Patient is up IND. IV fluids infusing. Tylenol given x1 for pain. 40 mEq given for low K+of  3.2. 20mEq will be given at 0800. Will continue to monitor, assess, and offer supportive cares.

## 2017-10-22 NOTE — CONSULTS
SURGERY CONSULTATION       PRIMARY CARE PHYSICIAN:  Dr. Karen Weiler.       REASON FOR CONSULTATION:  Right breast cellulitis, possible abscess.      HISTORY OF PRESENT ILLNESS:  Ms. Brea Aquino is a 52-year-old female who has had a history of previous breast abscesses, who came to the ER overnight with a 2-week history of a worsening boil around the nipple on the right side.  She states this began about 2 weeks ago, she had a nodular boil which she attempted to express on several occasions.  She has not been on antibiotics and had not had much progression of disease until a day or so ago when she began noting erythema extending upwards and laterally from the nipple itself.  She was seen in the ER where a bedside ultrasound suggested an abscess, I&D was performed without return of much fluid, the wound has been packed open.  She states that the erythema seems to have subsided somewhat in terms of intensity and color.  She feels somewhat better after the procedure as well.      PAST MEDICAL AND SURGICAL HISTORY:  This is reviewed from the chart and includes abscesses as above, hyperlipidemia, bipolar.      CURRENT MEDICATIONS:  At the time of admission, the patient is on Lamictal, Latuda, BuSpar, estradiol, hydroxyzine, Ativan, Aciphex, and simvastatin.      ALLERGIES:  The patient has a contact allergy to adhesive tape.      SOCIAL HISTORY:  The patient is a daily smoker.  She is here with a friend.      PHYSICAL EXAMINATION:   VITAL SIGNS:  Ms. Aquino is afebrile, temperature this morning is 97.6, pulse is 76 with a blood pressure of 123/78, respiration is 16 with 90% saturations on room air.   IN GENERAL:  She is generally alert, appropriate, nontoxic.   HEART:  Sounds are regular without murmurs.   LUNGS:  Breath sounds are clear without wheezes or crackles.   ABDOMEN:  Benign.   BREASTS:  Examination of the right breast shows an I&D site at about the 10 o'clock position in regards to the nipple in the base  of the areola.  This was packed with 1/4-inch plain packing with a scant amount of drainage on wick itself.  There is an amount of induration adjacent to this, but no overt fluctuance.  It is mildly tender.  There is a linear extension of erythema superior and lateral to this site, which may be a thrombosed vein, it is marked and the redness does not extend beyond the markings, the patient remarks that it is less intense in color than before.      LABORATORY EXAMS:  Notable for white blood cell count of 15.3 thousand, hemoglobin is 15.5.  Electrolyte profile is unremarkable.      IMAGING:  The patient's ultrasound from the bedside was examined, although interpretation is difficult.  There is some cobblestoning noted, but no large organized abscess.  There are evidently several small fluid pockets.      IMPRESSION AND RECOMMENDATIONS:  This is a 52-year-old female with current tobacco use history who has a right breast periareolar abscess which has been opened without return of much fluid.  I think this is likely just the nature of the disease as much of it might have been expressed over the course of the week.  She has had improvement in her symptoms since the emergency room procedure and I see no hard and fast indication for further instrumentation today.  This is especially in light of her not having antibiotics until her current admission.  I have rewritten her diet and agree with continued intravenous Ancef during her admission.  I told her and her admitting physician assistant that the wick should likely come out sometime this evening.  I would send her home to complete a course of antibiotics as well given the concomitant cellulitis.        Thank you very much for this consultation.  We will follow up with her tomorrow to see her progress.         ELIZABETH MORENO MD             D: 10/22/2017 10:04   T: 10/22/2017 11:26   MT: [id number]      Name:     MILAD DIMAS   MRN:      [id number]        Account:        [id number]   :      1965           Consult Date:  10/22/2017      Document: [id number]       cc: Karen Weiler MD

## 2017-10-22 NOTE — ED PROVIDER NOTES
History     Chief Complaint:  Rash    The history is provided by the patient.      Brea Aquino is a 52 year old female who presents to the emergency department for evaluation of a rash. The patient states that she developed a rash with skin redness, increased warmth, and what she felt may be a boil on her right breast approximately 1.5-2 weeks ago.This redness has been spreading, especially in the past two days, which was concerning to her and prompted her ED visit today. She additionally notes that she had a small amount of white discharge from her right nipple last week, though this has ceased. The patient denies any recent measured fevers.     Allergies:  Adhesive Tape  Seasonal Allergies    Medications:    Estrace  Lamictal  Latuda  Buspar  Vistaril  Ativan  Aciphex  Zocor  Atarax    Past Medical History:    Bipolar Disorder  Endometriosis  Hyperlipidemia  Depression    Past Surgical History:      Hysterectomy  Removal of Epidermoid Cyst  Tubal Ligation    Family History:    Cancer  Diabetes  Anxiety  Hyperlipidemia    Social History:  Smoker: 1.50 packs/day  Negative for alcohol use.  Marital Status:   [2]     Review of Systems   Constitutional: Negative for fever.   Skin: Positive for color change and rash.   All other systems reviewed and are negative.    Physical Exam   First Vitals:  BP: (!) 167/106  Pulse: 95  Temp: 98.2  F (36.8  C)  Resp: 20  SpO2: 99 %    Physical Exam  Constitutional: Patient appears well-developed and well-nourished. There is mild distress.   Head: No external signs of trauma noted.  Neck: No JVD noted  Eyes: Pupils are equal, round, and reactive to light.   Cardiovascular: Normal rate, regular rhythm and normal heart sounds.  Exam reveals no gallop and no friction rub.  No murmur heard. Normal peripheral pulses.  Pulmonary/Chest: Effort normal and breath sounds normal. No respiratory distress. Patient has no wheezes. Patient has no rales.   Abdominal: Soft. There  is no tenderness.   Extremities: No edema noted  Neurological: Patient is alert and oriented to person, place, and time.   Skin: Skin is warm and dry. There is no diaphoresis noted. There is induration around a 1 cm raised area superior to the right nipple. There is surrounding erythema locally as well as superior streaking erythema.     Emergency Department Course     Laboratory:  CBC: WBC: 15.0(H), HGB: 15.5, PLT: 311  BMP: Potassium 3.2(L), BUN 6(L), o/w WNL (Creatinine: 0.64)    Blood cultures: pending X2    Procedures:  [facility] Procedure Note   Limited Bedside ED Ultrasound of Soft Tissue:  PROCEDURE: PERFORMED BY: Dr. Rafat Mejía  INDICATIONS/SYMPTOM: Skin redness, evaluate for abscess, cellulitis or foreign body  PROBE: High frequency linear probe  BODY LOCATION: Soft tissue located on right breast   FINDINGS: Cobblestoning of soft tissue: present   Hypoechoic fluid (ie abscess) identified: present   INTERPRETATION:  The soft tissue and muscle layers were evaluated.      Findings indicate cellulitis and abscess  IMAGE DOCUMENTATION: Images were archived to PACs system.    Procedure: Incision and Drainage   Performed by: Chago Mejía MD    LOCATION:  Right Breast      ANESTHESIA:  Local field block using lidocaine 1.0% with epinephrine, total of 3 mLs    PREPARATION:  Cleansed with Betadine    PROCEDURE:  Area was incised with # 11 Blade (Sharp Point) with a Single Straight incision.  Wound treatment included Deloculation, Purulent Drainage and Expression of Material.  Packing consisted of a quarter inch Iodoform Gauze.  Appropriate dressing was applied to cover the area.    Patient Status:  Patient tolerated the procedure moderately well. There were no complications evident     Emergency Department Course:  Nursing notes and vitals reviewed.  0119 I performed an exam of the patient as documented above.    Blood drawn. This was sent to the lab for further testing, results above.    0243 Bedside  ultrasound performed as noted above.    0253 I&D performed by Dr. Mejía as noted above.    0256  I consulted with Dr. Miguel of the hospitalist services. They are in agreement to accept the patient for admission.    Findings and plan explained to the Patient who consents to admission. Discussed the patient with Dr. Miguel, who will admit the patient to a med/surg bed for further monitoring, evaluation, and treatment.    Impression & Plan    Medical Decision Making:  Brea Aquino is a 52 year old female who presents to the ED for evaluation of breast infection. Please see the HPI and exam for specifics. I did note an abscess with fluctuance that I was able to incise and drain. She was admitted to hospitalist service with further care with IV antibiotics.    Diagnosis:    ICD-10-CM   1. Abscess of right breast N61.1   2. Cellulitis of right breast N61.0     Disposition:  Admitted to med/surg.    Discharge Medications:  I, Vicente Mccarthy, am serving as a scribe on 10/22/2017 at 1:19 AM to personally document services performed by Rafat Mejía DO based on my observations and the provider's statements to me.   Vicente Mccarthy  10/22/2017   [facility] EMERGENCY DEPARTMENT       Rafat Mejía DO  10/22/17 0659

## 2017-10-22 NOTE — PLAN OF CARE
Problem: Patient Care Overview  Goal: Discharge Needs Assessment  PRIMARY DIAGNOSIS: SOFT TISSUE INFECTIONS-cellulitis-rt breast  OUTPATIENT/OBSERVATION GOALS TO BE MET BEFORE DISCHARGE:  1. Vitals sign stable or return to baseline: Yes      2. Tolerating oral antibiotics or has home infusion set up if applicable: No, IV ancef given in ED, and scheduled for 1200      3. Pain status: Improved-controlled with oral pain medications.      4. Return to near baseline physical activity: Yes      Discharge Planner Nurse   Safe discharge environment identified: Yes  Barriers to discharge: Not once medically cleared     Please review provider order for any additional goals.       Nurse to notify provider when observation goals have been met and patient is ready for discharge.          Patient is alert and oriented x4. VSS. Patient redness staying within outline on right breast. Patient started on IV ancef Q8 hours. Patient is up IND. IV fluids infusing. K  3.2, potassium replaced per protocol. Recheck at noon. Will continue to monitor, assess, and offer supportive cares.

## 2017-10-22 NOTE — PHARMACY-ADMISSION MEDICATION HISTORY
.Admission medication history interview status for this patient is complete. See [facility] admission navigator for allergy information, prior to admission medications and immunization status.     Medication history interview source(s):Patient  Medication history resources (including written lists, pill bottles, clinic record):None  [facility]    Changes made to PTA medication list:  Added: none  Deleted:   Changed: hydroxyzine updated, latuda is given at bedtime, buspirone dose 15 mg po bid    Actions taken by pharmacist (provider contacted, etc): Patient does not have home meds with her    Additional medication history information:None    Medication reconciliation/reorder completed by provider prior to medication history? No    Do you take OTC medications (eg tylenol, ibuprofen, fish oil, eye/ear drops, etc)? n  (Y/N)    For patients on insulin therapy: n (Y/N)  Lantus/levemir/NPH/Mix 70/30 dose:   (Y/N) (see Med list for doses)   Sliding scale Novolog Y/N  If Yes, do you have a baseline novolog pre-meal dose:  units with meals  Patients eat three meals a day:   Y/N    How many episodes of hypoglycemia do you have per week: _______  How many missed doses do you have per week: ______  How many times do you check your blood glucose per day: _______   Any Barriers to therapy - Be specific :  cost of medications, comfortable with giving injections (if applicable), comfortable and confident with current diabetes regimen: Y/N ______________      Prior to Admission medications    Medication Sig Last Dose Taking? Auth Provider   BUSPIRONE HCL PO Take 15 mg by mouth 2 times daily 10/21/2017 at 1200 Yes Unknown, Entered By History   HYDROXYZINE HCL PO Take 25 mg by mouth nightly as needed for itching or other (sleep) Past Week at Unknown time Yes Unknown, Entered By History   estradiol (ESTRACE) 0.5 MG tablet TAKE 1 TABLET BY MOUTH DAILY 10/21/2017 at 1200 Yes Weiler, Karen, MD   lamoTRIgine (LAMICTAL)  150 MG tablet TK 1 T PO  QAM AND TK 1 QPM 10/21/2017 at 1200 Yes Reported, Patient   LATUDA 60 MG TABS tablet TAKE ONE TABLET BY MOUTH bedtime 10/21/2017 at 2000 Yes Reported, Patient   RABEprazole (ACIPHEX) 20 MG EC tablet Take 1 tablet (20 mg) by mouth daily 10/21/2017 at 1200 Yes Weiler, Karen, MD   simvastatin (ZOCOR) 40 MG tablet Take 1 tablet (40 mg) by mouth At Bedtime Past Week at 2000 Yes Weiler, Karen, MD   hydrOXYzine (ATARAX) 10 MG tablet Take 10 mg by mouth 3 times daily as needed for anxiety  Past Week at Unknown time Yes Reported, Patient   LORazepam (ATIVAN) 0.5 MG tablet TAKE ONE TO TWO TABLETS BY MOUTH ONCE DAILY AS NEEDED FOR PANIC.   Reported, Patient   ORDER FOR DME TENS unit including supplies   Weiler, Karen, MD   ORDER FOR DME 1 TENS unit   Breanne Ventura MD   ORDER FOR DME TENS Unit   Weiler, Karen, MD

## 2017-10-22 NOTE — IP AVS SNAPSHOT
[facility] Observation Department    [address]    [facility] [address]    Phone:  [phone]                                       After Visit Summary   10/22/2017    Brea Aquino    MRN: [id number]           After Visit Summary Signature Page     I have received my discharge instructions, and my questions have been answered. I have discussed any challenges I see with this plan with the nurse or doctor.    ..........................................................................................................................................  Patient/Patient Representative Signature      ..........................................................................................................................................  Patient Representative Print Name and Relationship to Patient    ..................................................               ................................................  Date                                            Time    ..........................................................................................................................................  Reviewed by Signature/Title    ...................................................              ..............................................  Date                                                            Time

## 2017-10-22 NOTE — PROGRESS NOTES
Pt went out to smoke. Informed pt she would be going at her own risk and it is not recommended to leave the unit.

## 2017-10-22 NOTE — IP AVS SNAPSHOT
MRN:[id number]                      After Visit Summary   10/22/2017    Brea Aquino    MRN: [id number]           Thank you!     Thank you for choosing [facility] for your care. Our goal is always to provide you with excellent care. Hearing back from our patients is one way we can continue to improve our services. Please take a few minutes to complete the written survey that you may receive in the mail after you visit. If you would like to speak to someone directly about your visit please contact Patient Relations at [phone]. Thank you!          Patient Information     Date Of Birth          1965        About your hospital stay     You were admitted on:  October 22, 2017 You last received care in the:  [facility] Observation Department    You were discharged on:  October 23, 2017        Reason for your hospital stay       You were hospitalized due to a right breast abscess which was drained with surrounding cellulitis.  Your symptoms improved with IV antibiotics.                  Who to Call     For medical emergencies, please call 911.  For non-urgent questions about your medical care, please call your primary care provider or clinic, [phone]          Attending Provider     Provider Specialty    Rafat Mejía,  Emergency Medicine    Abraham Miguel MD Internal Medicine    Amy Forrest DO Internal Medicine       Primary Care Provider Office Phone # Fax #    Karen Weiler, [phone]       When to contact your care team       Notify for fever >101.5; black or bloody stools; severe, persistent, or worsening nausea, vomiting, abdominal pain or distention, diarrhea, constipation; chest pain, difficulty breathing, swelling; dizziness, weakness, lightheadedness, fainting.  Proceed to the nearest emergency room for any urgent concerns.                  After Care Instructions     Activity       Your activity upon  "discharge: activity as tolerated            Diet       Follow this diet upon discharge: Orders Placed This Encounter      Regular Diet Adult            Wound care and dressings       Instructions to care for your wound at home: daily dry gauze dressing change.  May change more often if dressing saturated with drainage.                  Follow-up Appointments     Follow-up and recommended labs and tests        Please follow up with PCP on 10/25 for recheck.                             Further instructions from your care team       Daily dressing change at site; may change as often as needed if becomes saturated with drainage.    May followup with PCP or Surgery office to ensure site is healing appropriately.  Surgery office phone #[phone].    Pending Results     Date and Time Order Name Status Description    10/22/2017 0131 Blood culture Preliminary     10/22/2017 0131 Blood culture Preliminary             Statement of Approval     Ordered          10/23/17 0946  I have reviewed and agree with all the recommendations and orders detailed in this document.  EFFECTIVE NOW     Approved and electronically signed by:  Yasmin Ortiz PA-C             Admission Information     Date & Time Provider Department Dept. Phone    10/22/2017 Amy Forrest DO [facility] Observation Department [phone]      Your Vitals Were     Blood Pressure Pulse Temperature Respirations Height Weight    132/83 (BP Location: Left arm) 69 98.1  F (36.7  C) (Oral) 18 1.727 m (5' 8\") 97.5 kg (215 lb)    Last Period Pulse Oximetry BMI (Body Mass Index)             11/22/2006 92% 32.69 kg/m2         [employer] Information     [employer] gives you secure access to your electronic health record. If you see a primary care provider, you can also send messages to your care team and make appointments. If you have questions, please call your primary care clinic.  If you do not have a primary care provider, please call " [phone] and they will assist you.        Care EveryWhere ID     This is your Care EveryWhere ID. This could be used by other organizations to access your [place] medical records  [id number]        Equal Access to Services     MARY JO DIAZ : Bryanna Ricoali, watristinda luqadaha, qaybta kaalmada ademaile, javier kailain hayaaalexandre burciagaterandi walls. So [facility] [phone].    ATENCIÓN: Si habla español, tiene a singleton disposición servicios gratuitos de asistencia lingüística. Llame al [phone].    We comply with applicable federal civil rights laws and [place] laws. We do not discriminate on the basis of race, color, national origin, age, disability, sex, sexual orientation, or gender identity.               Review of your medicines      START taking        Dose / Directions    acetaminophen 325 MG tablet   Commonly known as:  TYLENOL   Used for:  Abscess of right breast, Cellulitis of right breast        Dose:  650 mg   Take 2 tablets (650 mg) by mouth every 4 hours as needed for mild pain   Quantity:  100 tablet   Refills:  0       cephALEXin 500 MG capsule   Commonly known as:  KEFLEX   Used for:  Cellulitis of right breast        Dose:  500 mg   Take 1 capsule (500 mg) by mouth 4 times daily for 7 days   Quantity:  28 capsule   Refills:  0         CONTINUE these medicines which have NOT CHANGED        Dose / Directions    BUSPIRONE HCL PO        Dose:  15 mg   Take 15 mg by mouth 2 times daily   Refills:  0       estradiol 0.5 MG tablet   Commonly known as:  ESTRACE   Used for:  Symptomatic menopausal or female climacteric states        TAKE 1 TABLET BY MOUTH DAILY   Quantity:  90 tablet   Refills:  1       * hydrOXYzine 10 MG tablet   Commonly known as:  ATARAX        Dose:  10 mg   Take 10 mg by mouth 3 times daily as needed for anxiety   Refills:  0       * HYDROXYZINE HCL PO        Dose:  25 mg   Take 25 mg by mouth nightly as needed for itching or other (sleep)   Refills:  0       lamoTRIgine  150 MG tablet   Commonly known as:  LaMICtal        TK 1 T PO  QAM AND TK 1 QPM   Refills:  1       LATUDA 60 MG Tabs tablet   Generic drug:  lurasidone        TAKE ONE TABLET BY MOUTH bedtime   Refills:  1       LORazepam 0.5 MG tablet   Commonly known as:  ATIVAN        TAKE ONE TO TWO TABLETS BY MOUTH ONCE DAILY AS NEEDED FOR PANIC.   Refills:  1       * order for DME   Used for:  Neck pain        TENS Unit   Quantity:  1 Device   Refills:  0       * order for DME   Used for:  Cervicalgia, Upper back pain, Tension-type headaches        1 TENS unit   Quantity:  1 Device   Refills:  0       * order for DME   Used for:  Cervicalgia        TENS unit including supplies   Quantity:  1 Device   Refills:  0       RABEprazole 20 MG EC tablet   Commonly known as:  ACIPHEX   Used for:  Gastroesophageal reflux disease without esophagitis        Dose:  20 mg   Take 1 tablet (20 mg) by mouth daily   Quantity:  90 tablet   Refills:  1       simvastatin 40 MG tablet   Commonly known as:  ZOCOR   Used for:  Hyperlipidemia LDL goal <130        Dose:  40 mg   Take 1 tablet (40 mg) by mouth At Bedtime   Quantity:  90 tablet   Refills:  2       * Notice:  This list has 5 medication(s) that are the same as other medications prescribed for you. Read the directions carefully, and ask your doctor or other care provider to review them with you.         Where to get your medicines      These medications were sent to [facility] [place] [address]     Phone:  [phone]     cephALEXin 500 MG capsule         Some of these will need a paper prescription and others can be bought over the counter. Ask your nurse if you have questions.     You don't need a prescription for these medications     acetaminophen 325 MG tablet               ANTIBIOTIC INSTRUCTION     You've Been Prescribed an Antibiotic - Now What?  Your healthcare team thinks that you or your  loved one might have an infection. Some infections can be treated with antibiotics, which are powerful, life-saving drugs. Like all medications, antibiotics have side effects and should only be used when necessary. There are some important things you should know about your antibiotic treatment.      Your healthcare team may run tests before you start taking an antibiotic.    Your team may take samples (e.g., from your blood, urine or other areas) to run tests to look for bacteria. These test can be important to determine if you need an antibiotic at all and, if you do, which antibiotic will work best.      Within a few days, your healthcare team might change or even stop your antibiotic.    Your team may start you on an antibiotic while they are working to find out what is making you sick.    Your team might change your antibiotic because test results show that a different antibiotic would be better to treat your infection.    In some cases, once your team has more information, they learn that you do not need an antibiotic at all. They may find out that you don't have an infection, or that the antibiotic you're taking won't work against your infection. For example, an infection caused by a virus can't be treated with antibiotics. Staying on an antibiotic when you don't need it is more likely to be harmful than helpful.      You may experience side effects from your antibiotic.    Like all medications, antibiotics have side effects. Some of these can be serious.    Let you healthcare team know if you have any known allergies when you are admitted to the hospital.    One significant side effect of nearly all antibiotics is the risk of severe and sometimes deadly diarrhea caused by Clostridium difficile (C. Difficile). This occurs when a person takes antibiotics because some good germs are destroyed. Antibiotic use allows C. diificile to take over, putting patients at high risk for this serious infection.    As a  patient or caregiver, it is important to understand your or your loved one's antibiotic treatment. It is especially important for caregivers to speak up when patients can't speak for themselves. Here are some important questions to ask your healthcare team.    What infection is this antibiotic treating and how do you know I have that infection?    What side effects might occur from this antibiotic?    How long will I need to take this antibiotic?    Is it safe to take this antibiotic with other medications or supplements (e.g., vitamins) that I am taking?     Are there any special directions I need to know about taking this antibiotic? For example, should I take it with food?    How will I be monitored to know whether my infection is responding to the antibiotic?    What tests may help to make sure the right antibiotic is prescribed for me?      Information provided by:  www.cdc.gov/getsmart  U.S. Department of Health and Human Services  Centers for disease Control and Prevention  National Center for Emerging and Zoonotic Infectious Diseases  Division of Healthcare Quality Promotion         Protect others around you: Learn how to safely use, store and throw away your medicines at www.disposemymeds.org.             Medication List: This is a list of all your medications and when to take them. Check marks below indicate your daily home schedule. Keep this list as a reference.      Medications           Morning Afternoon Evening Bedtime As Needed    acetaminophen 325 MG tablet   Commonly known as:  TYLENOL   Take 2 tablets (650 mg) by mouth every 4 hours as needed for mild pain   Last time this was given:  650 mg on 10/22/2017  5:10 AM                                BUSPIRONE HCL PO   Take 15 mg by mouth 2 times daily   Last time this was given:  15 mg on 10/23/2017  8:43 AM                                cephALEXin 500 MG capsule   Commonly known as:  KEFLEX   Take 1 capsule (500 mg) by mouth 4 times daily for 7 days                                 estradiol 0.5 MG tablet   Commonly known as:  ESTRACE   TAKE 1 TABLET BY MOUTH DAILY   Last time this was given:  0.5 mg on 10/23/2017  8:43 AM                                * hydrOXYzine 10 MG tablet   Commonly known as:  ATARAX   Take 10 mg by mouth 3 times daily as needed for anxiety   Last time this was given:  25 mg on 10/22/2017  8:48 PM                                * HYDROXYZINE HCL PO   Take 25 mg by mouth nightly as needed for itching or other (sleep)   Last time this was given:  25 mg on 10/22/2017  8:48 PM                                lamoTRIgine 150 MG tablet   Commonly known as:  LaMICtal   TK 1 T PO  QAM AND TK 1 QPM   Last time this was given:  150 mg on 10/23/2017  8:43 AM                                LATUDA 60 MG Tabs tablet   TAKE ONE TABLET BY MOUTH bedtime   Last time this was given:  60 mg on 10/22/2017  8:42 PM   Generic drug:  lurasidone                                LORazepam 0.5 MG tablet   Commonly known as:  ATIVAN   TAKE ONE TO TWO TABLETS BY MOUTH ONCE DAILY AS NEEDED FOR PANIC.                                * order for DME   TENS Unit                                * order for DME   1 TENS unit                                * order for DME   TENS unit including supplies                                RABEprazole 20 MG EC tablet   Commonly known as:  ACIPHEX   Take 1 tablet (20 mg) by mouth daily                                simvastatin 40 MG tablet   Commonly known as:  ZOCOR   Take 1 tablet (40 mg) by mouth At Bedtime   Last time this was given:  40 mg on 10/22/2017  8:42 PM                                * Notice:  This list has 5 medication(s) that are the same as other medications prescribed for you. Read the directions carefully, and ask your doctor or other care provider to review them with you.

## 2017-10-22 NOTE — PROGRESS NOTES
Brief Internal Medicine Note:    Patient admitted earlier today.  H/P reviewed.  Has a right breast abscess with surrounding cellulitis with elevated wbc.  I/D performed in the ED with not enough fluid to send for analysis.  Started on Ancef.  General Surgery consulted with no indication at this time for further I/D.  Erythema marked and not outside previously outlined borders.  Lungs with mild diffuse wheezing.  Has a history of daily tobacco use.  No hx of COPD.  Stable on room air.  - continue IV Ancef overnight.  Likely transition to po antibiotics in am  - ok per surgery to remove the wick later this evening  - repeat WBC in am   - follow up final blood culture results  - duoneb now and q4 hours prn  - continue home Buspirone, Hydroxyzine, Lamictal, Latuda, Aciphex, Simvastatin    Yasmin CABAN-C  Hospitalist Service  Pager [phone]

## 2017-10-22 NOTE — PROGRESS NOTES
Surgery-Hollie Zhao  Feels well, area of erythema has regressed  Small amount of spotting on dressing; okay to remove wick this PM, may wet it with NS first, then dress dry.

## 2017-10-22 NOTE — ED NOTES
"[facility]  ED Nurse Handoff Report    Brea Aquino is a 52 year old female   ED Chief complaint: Breast Pain  . ED Diagnosis:   Final diagnoses:   Abscess of right breast   Cellulitis of right breast     Allergies:   Allergies   Allergen Reactions     Adhesive Tape      Seasonal Allergies        Code Status: Full Code  Activity level - Baseline/Home:  Independent. Activity Level - Current:   Independent. Lift room needed: No. Bariatric: No   Needed: No   Isolation: No. Infection: Not Applicable.     Vital Signs:   Vitals:    10/22/17 0100   BP: (!) 167/106   Pulse: 95   Resp: 20   Temp: 98.2  F (36.8  C)   TempSrc: Temporal   SpO2: 99%       Cardiac Rhythm:  ,      Pain level: 0-10 Pain Scale: 1  Patient confused: No. Patient Falls Risk: Yes.   Elimination Status: Has voided   Patient Report - Initial Complaint: Abscess to R breast. Focused Assessment: Patient presents to ED due to boil to R breast. States boil presents approx 1.5- 2 weeks ago. States noticed pain and redness become worse the past couple days. Denies fever  ABC intact   A/O x4  Did report having a \" couple alcoholic beverages at home PTA to ED\" patient is acting calm and cooperative at this time  Tests Performed: CBC, BMP, POC US soft tissue. Abnormal Results:   Basic metabolic panel Resulted Abnormal Result -   Sodium: 137 mmol/L [Ref Range: 133 - 144]  Potassium: 3.2 mmol/L [Ref Range: 3.4 - 5.3]  Chloride: 105 mmol/L [Ref Range: 94 - 109]  Carbon Dioxide: 23 mmol/L [Ref Range: 20 - 32]  Anion Gap: 9 mmol/L [Ref Range: 3 - 14]  Glucose: 89 mg/dL [Ref Range: 70 - 99]  Urea Nitrogen: 6 mg/dL [Ref Range: 7 - 30]  Creatinine: 0.64 mg/dL [Ref Range: 0.52 - 1.04]  GFR Estimate: >90 mL/min/1.7m2 [Ref Range: >60] (Non  GFR Calc)  GFR Estimate If Black: >90 mL/min/1.7m2 [Ref Range: >60] ( GFR Calc)  Calcium: 8.7 mg/dL [Ref Range: 8.5 - 10.1]  Collected: 10/22/2017 01:53  Last updated: 10/22/2017 " 02:28     CBC with platelets differential Resulted Abnormal Result -   WBC: 15.0 10e9/L [Ref Range: 4.0 - 11.0]  RBC Count: 4.78 10e12/L [Ref Range: 3.8 - 5.2]  Hemoglobin: 15.5 g/dL [Ref Range: 11.7 - 15.7]  Hematocrit: 45.6 % [Ref Range: 35.0 - 47.0]  MCV: 95 fl [Ref Range: 78 - 100]  MCH: 32.4 pg [Ref Range: 26.5 - 33.0]  MCHC: 34.0 g/dL [Ref Range: 31.5 - 36.5]  RDW: 13.0 % [Ref Range: 10.0 - 15.0]  Platelet Count: 311 10e9/L [Ref Range: 150 - 450]  Diff Method: Automated Method  % Neutrophils: 60.3 %  % Lymphocytes: 30.4 %  % Monocytes: 7.1 %  % Eosinophils: 1.4 %  % Basophils: 0.4 %  % Immature Granulocytes: 0.4 %  Nucleated RBCs: 0 /100 [Ref Range: 0]  Absolute Neutrophil: 9.0 10e9/L [Ref Range: 1.6 - 8.3]  Absolute Lymphocytes: 4.6 10e9/L [Ref Range: 0.8 - 5.3]  Absolute Monocytes: 1.1 10e9/L [Ref Range: 0.0 - 1.3]  Absolute Eosinophils: 0.2 10e9/L [Ref Range: 0.0 - 0.7]  Absolute Basophils: 0.1 10e9/L [Ref Range: 0.0 - 0.2]  Abs Immature Granulocytes: 0.1 10e9/L [Ref Range: 0 - 0.4]  Absolute Nucleated RBC: 0.0       Treatments provided: ANCEF    Family Comments: Friend at bedside and aware of admission   OBS brochure/video discussed/provided to patient:  Yes  ED Medications:   Medications   lidocaine 1% with EPINEPHrine 1:100,000 1 %-1:[id number] injection (not administered)   ceFAZolin sodium-dextrose (ANCEF) infusion 2 g (not administered)     Drips infusing:  Yes  For the majority of the shift, the patient's behavior Green. Interventions performed were NA.     Severe Sepsis OR Septic Shock Diagnosis Present: No      ED Nurse Name/Phone Number: Yoselinmariama Haddad,   3:37 AM  RECEIVING UNIT ED HANDOFF REVIEW    Above ED Nurse Handoff Report was reviewed: Yes  Reviewed by: Evelia Marte on October 22, 2017 at 3:50 AM

## 2017-10-22 NOTE — PLAN OF CARE
Problem: Patient Care Overview  Goal: Plan of Care/Patient Progress Review  ROOM # 8082  Living Situation (if not independent, order SW consult): ind  Facility name:  : [facility]  Activity level at baseline:ind  Activity level on admit: ind        Patient registered to observation; given Patient Bill of Rights; given the opportunity to ask questions about observation status and their plan of care.  Patient has been oriented to the observation room, bathroom and call light is in place.     Discussed discharge goals and expectations with patient/family.

## 2017-10-22 NOTE — ED NOTES
Assisted MD with I&D and packing of right breast. Patient tolerated without difficulty. At bedside during exam and procedure and during exam by hospitalist.

## 2017-10-22 NOTE — PLAN OF CARE
Problem: Patient Care Overview  Goal: Discharge Needs Assessment  PRIMARY DIAGNOSIS: SOFT TISSUE INFECTIONS-cellulitis-rt breast  OUTPATIENT/OBSERVATION GOALS TO BE MET BEFORE DISCHARGE:  1. Vitals sign stable or return to baseline: Yes      2. Tolerating oral antibiotics or has home infusion set up if applicable: No, IV ancef every day      3. Pain status: Improved-controlled with oral pain medications.      4. Return to near baseline physical activity: Yes      Discharge Planner Nurse   Safe discharge environment identified: Yes  Barriers to discharge: Not once medically cleared      Please review provider order for any additional goals.       Nurse to notify provider when observation goals have been met and patient is ready for discharge.          Patient is alert and oriented x4. VSS. Patient redness is not as mad, but now more diffuse outside the outline. Ok to pull wick at 10pm. Will continue to monitor, assess, and offer supportive cares.

## 2017-10-22 NOTE — H&P
CHIEF COMPLAINT:  Right breast redness.      HISTORY OF PRESENT ILLNESS:  Ms. Brea Aquino is a 52-year-old female with a history of bipolar disorder, and previous subcutaneous cysts/abscesses, who presents to the hospital today for right breast redness, swelling and pain.  The patient reports that about two weeks ago, she developed what she thought was a cyst or a pimple of her right breast.  Since then, she has noticed that it has spread, especially over the past two days, with increasing redness in the upper part of her breast.  She also noticed some whitish discharge.  This prompted her appearance in the Emergency Department.      She has had several boils or cysts in the past that required surgical drainage.      In the ER, Dr. Mejía attempted to aspirate some fluid, and was unable to get any measurable amounts.  There was not enough to send down to the lab.  She is being given a dose of Ancef here in the Emergency Department, and will be admitted to observation.      On arrival in the ER, blood pressure was 167/106 with a heart rate of 95.  Afebrile.  Saturation 99% on room air.  Lab work was notable for a white cell count of 15,000 and potassium of 3.2.  I did speak to Dr. Mejía of the ER on admission of this patient in the hospital.  Bedside ultrasound was done by ER physician.      PAST MEDICAL HISTORY:   1.  History of subcutaneous cysts/abscesses in the past, requiring incision and drainage.   2.  Hyperlipidemia.   3.  Bipolar disorder, controlled with Lamictal and Latuda.      CURRENT MEDICATIONS:  Await pharmacy reconciliation of medicines.  Medication list appears to include the following.   1.  Lamictal.   2.  Latuda.   3.  BuSpar.   4.  Estradiol.   5.  Hydroxyzine.   6.  Ativan.   7.  Aciphex.   8.  Simvastatin.      ALLERGIES:  Adhesive tape.      FAMILY HISTORY:  Reviewed.  Nothing contributory to this admission.      SOCIAL HISTORY:  The patient smokes one pack per day of cigarettes.  Drinks  alcohol two times per week.  Denies any history of alcohol abuse.  No illicit drugs.      REVIEW OF SYSTEMS:  Please see HPI for details.  Comprehensive greater than 10-point review of systems is otherwise negative besides that detailed above.      PHYSICAL EXAM:   VITAL SIGNS:  Blood pressure is currently 165/105 with a heart rate of 95.  Afebrile.  Saturation 99% on room air.   GENERAL:  The patient appears nontoxic and in no acute distress.  She is awake, alert and oriented x3.  Good historian.  Laughing and joking with the examiner and a visitor she has with her.   HEENT:  Head is atraumatic.  Sclerae are white.  Eyelids are normal.  Conjunctivae are normal.  Extraocular movements are intact.   NECK:  Supple.  No cervical or supraclavicular lymphadenopathy.   HEART:  Regular rate and rhythm.  No significant murmurs.  No lower extremity edema.   LUNGS:  Clear to auscultation bilaterally.  No intercostal retractions.  No conversational dyspnea.   ABDOMEN:  Nontender, nondistended.  Soft.  No masses.  No organomegaly.   EXTREMITIES:  No edema.   SKIN:  No rash.  No jaundice.  Skin is dry to touch.   RIGHT BREAST:  Examination of the patient's right breast reveals some induration just above the nipple.  A smaller area where incision and drainage was performed by the ER with a wick in place.  There is some erythema into the upper right breast above the nipple, mild tenderness to palpation.   NEUROLOGIC:  Cranial nerves II-XII are intact.  Moves all extremities appropriately.  Sensation intact to light touch in the upper and lower extremities bilaterally.   PSYCHIATRIC:  The patient is awake, alert and oriented x3.  Mood and affect normal and appropriate.      LABORATORY AND IMAGING DATA:  Reviewed above in HPI.      IMPRESSION AND PLAN:  Ms. Aquino is a 52-year-old female who presents to the hospital this evening for right breast redness and swelling.  Workup in the ER notable for presence of leukocytosis, and  ultrasound showing a possible small fluid collection.  Attempts were made at incision and drainage in the ER, with minimal fluid obtained.  She is being admitted to the Hospitalist Service for right breast cellulitis.   1.  Right breast cellulitis:  We will treat with IV Ancef.  Assuming clinical improvement overnight, I suspect the patient should be able to be discharged within 24 hours, possibly later on today. If that does not show any appreciable improvement, would consider a General Surgery consultation to further evaluate.   2.  Bipolar history:  Continue Lamictal and Latuda.   3.  Active smoker.   4.  The patient will be admitted to observation, as I anticipate discharge within 24-48 hours.         EDGAR FELIX MD             D: 10/22/2017 03:28   T: 10/22/2017 03:56   MT: [id number]      Name:     MILAD DIMAS   MRN:      [id number]        Account:      [id number]   :      1965           Admitted:     [id number]      Document: [id number]       cc: Karen Weiler MD

## 2017-10-23 VITALS
TEMPERATURE: 98.1 F | RESPIRATION RATE: 18 BRPM | BODY MASS INDEX: 32.58 KG/M2 | SYSTOLIC BLOOD PRESSURE: 132 MMHG | DIASTOLIC BLOOD PRESSURE: 83 MMHG | WEIGHT: 215 LBS | OXYGEN SATURATION: 92 % | HEART RATE: 69 BPM | HEIGHT: 68 IN

## 2017-10-23 LAB
ANION GAP SERPL CALCULATED.3IONS-SCNC: 6 MMOL/L (ref 3–14)
BUN SERPL-MCNC: 7 MG/DL (ref 7–30)
CALCIUM SERPL-MCNC: 8.4 MG/DL (ref 8.5–10.1)
CHLORIDE SERPL-SCNC: 106 MMOL/L (ref 94–109)
CO2 SERPL-SCNC: 27 MMOL/L (ref 20–32)
CREAT SERPL-MCNC: 0.86 MG/DL (ref 0.52–1.04)
ERYTHROCYTE [DISTWIDTH] IN BLOOD BY AUTOMATED COUNT: 13.2 % (ref 10–15)
GFR SERPL CREATININE-BSD FRML MDRD: 70 ML/MIN/1.7M2
GLUCOSE SERPL-MCNC: 90 MG/DL (ref 70–99)
HCT VFR BLD AUTO: 43.2 % (ref 35–47)
HGB BLD-MCNC: 14.6 G/DL (ref 11.7–15.7)
MCH RBC QN AUTO: 32.7 PG (ref 26.5–33)
MCHC RBC AUTO-ENTMCNC: 33.8 G/DL (ref 31.5–36.5)
MCV RBC AUTO: 97 FL (ref 78–100)
PLATELET # BLD AUTO: 273 10E9/L (ref 150–450)
POTASSIUM SERPL-SCNC: 3.9 MMOL/L (ref 3.4–5.3)
RBC # BLD AUTO: 4.47 10E12/L (ref 3.8–5.2)
SODIUM SERPL-SCNC: 139 MMOL/L (ref 133–144)
WBC # BLD AUTO: 7 10E9/L (ref 4–11)

## 2017-10-23 PROCEDURE — 25000128 H RX IP 250 OP 636: Performed by: INTERNAL MEDICINE

## 2017-10-23 PROCEDURE — 36415 COLL VENOUS BLD VENIPUNCTURE: CPT | Performed by: PHYSICIAN ASSISTANT

## 2017-10-23 PROCEDURE — 25000132 ZZH RX MED GY IP 250 OP 250 PS 637: Performed by: INTERNAL MEDICINE

## 2017-10-23 PROCEDURE — 85027 COMPLETE CBC AUTOMATED: CPT | Performed by: PHYSICIAN ASSISTANT

## 2017-10-23 PROCEDURE — 80048 BASIC METABOLIC PNL TOTAL CA: CPT | Performed by: PHYSICIAN ASSISTANT

## 2017-10-23 PROCEDURE — 25000132 ZZH RX MED GY IP 250 OP 250 PS 637: Performed by: PHYSICIAN ASSISTANT

## 2017-10-23 PROCEDURE — G0378 HOSPITAL OBSERVATION PER HR: HCPCS

## 2017-10-23 PROCEDURE — 96376 TX/PRO/DX INJ SAME DRUG ADON: CPT

## 2017-10-23 PROCEDURE — 99217 ZZC OBSERVATION CARE DISCHARGE: CPT | Performed by: PHYSICIAN ASSISTANT

## 2017-10-23 RX ORDER — ACETAMINOPHEN 325 MG/1
650 TABLET ORAL EVERY 4 HOURS PRN
Qty: 100 TABLET | Refills: 0 | COMMUNITY
Start: 2017-10-23 | End: 2018-08-06

## 2017-10-23 RX ORDER — CEPHALEXIN 500 MG/1
500 CAPSULE ORAL 4 TIMES DAILY
Qty: 28 CAPSULE | Refills: 0 | Status: SHIPPED | OUTPATIENT
Start: 2017-10-23 | End: 2017-10-30

## 2017-10-23 RX ADMIN — ESTRADIOL 0.5 MG: 0.5 TABLET ORAL at 08:43

## 2017-10-23 RX ADMIN — CEFAZOLIN SODIUM 2 G: 2 INJECTION, SOLUTION INTRAVENOUS at 04:11

## 2017-10-23 RX ADMIN — BUSPIRONE HYDROCHLORIDE 15 MG: 15 TABLET ORAL at 08:43

## 2017-10-23 RX ADMIN — PANTOPRAZOLE SODIUM 40 MG: 40 TABLET, DELAYED RELEASE ORAL at 08:42

## 2017-10-23 RX ADMIN — LAMOTRIGINE 150 MG: 150 TABLET ORAL at 08:43

## 2017-10-23 NOTE — DISCHARGE SUMMARY
[facility] Observation Unit Discharge Summary          rBea Aquino MRN# [id number]   Age: 52 year old YOB: 1965     Date of Admission:  10/22/2017  Date of Discharge::  10/23/2017  Admitting Physician:  Abraham Miguel MD  Discharge Physician:  Yasmin Ortiz PA-C  Primary Physician: Weiler, Karen     Primary Discharge Diagnoses:   Right Breast Abscess s/p ID  Right Breast Cellulitis     Secondary Discharge Diagnoses:   HLD  Bipolar Disorder  Tobacco Abuse     Hospital Course:   For detail history, please refer to H & P from 10/22/2017. In brief, this is a 52 year old female with a history of bipolar disorder, and previous subcutaneous cysts/abscesses, who presented to the hospital 10/22 for right breast redness, swelling and pain.  The patient reports that about two weeks ago, she developed what she thought was a cyst or a pimple of her right breast.  Since then, she has noticed that it has spread, especially over the past two days, with increasing redness in the upper part of her breast.  She also noticed some whitish discharge.  This prompted her appearance in the Emergency Department.    In the ER, Dr. Mejía attempted to aspirate some fluid, and was unable to get any measurable amounts.  There was not enough to send down to the lab.  She is being given a dose of Ancef here in the Emergency Department, and will be admitted to observation.       On arrival in the ER, blood pressure was 167/106 with a heart rate of 95.  Afebrile.  Saturation 99% on room air.  Lab work was notable for a white cell count of 15,000 and potassium of 3.2.  I did speak to Dr. Mejía of the ER on admission of this patient in the hospital.  Bedside ultrasound was done by ER physician.     Patient was admitted to the observation unit.  General Surgery was consulted to evaluate and recommended no further I/D necessary.  Patient was started on IV Ancef.  The wick was removed from the I/D site.  Her wbc  "improved to 7.  Potassium was replaced.  She remained afebrile and hemodynamically stable.  Patient was discharged on po Keflex with close follow up with her PCP for recheck later this week.  Few scattered wheezes were heard on lung exam.  Patient remained stable on room air.  Smoking cessation was encouraged and patient encouraged to follow up with PCP for PFT testing.        Procedures/Imaging:     Results for orders placed or performed during the hospital encounter of 10/22/17   POC US SOFT TISSUE    Impression    [facility] Procedure Note     Limited Bedside ED Ultrasound of Soft Tissue:    PROCEDURE: PERFORMED BY: Dr. Rafat Mejía  INDICATIONS/SYMPTOM: Skin redness, evaluate for abscess, cellulitis or foreign body  PROBE: High frequency linear probe  BODY LOCATION: Soft tissue located on right breast     FINDINGS: Cobblestoning of soft tissue: present   Hypoechoic fluid (ie abscess) identified: present       INTERPRETATION:  The soft tissue and muscle layers were evaluated.      Findings indicate cellulitis and abscess    IMAGE DOCUMENTATION: Images were archived to PACs system.         Consultations:   General Surgery    Code Status:   Full    Allergies:     Allergies   Allergen Reactions     Adhesive Tape      Seasonal Allergies         Subjective:   Patient reports right breast redness has improved.  Denies pain.  Feels skin it pruritis b/c of the adhesive tape.    Physical Exam:   Blood pressure 132/83, pulse 69, temperature 98.1  F (36.7  C), temperature source Oral, resp. rate 18, height 1.727 m (5' 8\"), weight 97.5 kg (215 lb), last menstrual period 11/22/2006, SpO2 92 %, not currently breastfeeding. on room air  General: Alert, interactive, NAD, lying in bed  HEENT: AT/NC, sclera anicteric, PERRL, EOMI  Resp: clear to auscultation bilaterally, no crackles or few scattered wheezes  Cardiac: regular rate and rhythm, no murmur.    Abdomen: Soft, nontender, nondistended. +BS.  No rebound or " guarding.  Extremities: No LE edema  Right Breast:  Induration superior and lateral to the nipple with scant drainage with overlying dressing c/d/i.  Erythema into the upper right breast receeding from the previously marked areas.  No tenderness to palpation.  Neuro: Alert & oriented x 3, no focal deficits, moves all extremities equally     Discharge Medicatios:        Discharge Medication List as of 10/23/2017 10:10 AM      START taking these medications    Details   acetaminophen (TYLENOL) 325 MG tablet Take 2 tablets (650 mg) by mouth every 4 hours as needed for mild pain, Disp-100 tablet, R-0, OTC      cephALEXin (KEFLEX) 500 MG capsule Take 1 capsule (500 mg) by mouth 4 times daily for 7 days, Disp-28 capsule, R-0, E-Prescribe         CONTINUE these medications which have NOT CHANGED    Details   BUSPIRONE HCL PO Take 15 mg by mouth 2 times daily, Historical      !! HYDROXYZINE HCL PO Take 25 mg by mouth nightly as needed for itching or other (sleep), Historical      estradiol (ESTRACE) 0.5 MG tablet TAKE 1 TABLET BY MOUTH DAILY, Disp-90 tablet, R-1, E-Prescribe      lamoTRIgine (LAMICTAL) 150 MG tablet TK 1 T PO  QAM AND TK 1 QPM, R-1, Historical      LATUDA 60 MG TABS tablet TAKE ONE TABLET BY MOUTH bedtime, R-1, JENNIFER, Historical      RABEprazole (ACIPHEX) 20 MG EC tablet Take 1 tablet (20 mg) by mouth daily, Disp-90 tablet, R-1, E-Prescribe      simvastatin (ZOCOR) 40 MG tablet Take 1 tablet (40 mg) by mouth At Bedtime, Disp-90 tablet, R-2, E-Prescribe      !! hydrOXYzine (ATARAX) 10 MG tablet Take 10 mg by mouth 3 times daily as needed for anxiety , Historical      LORazepam (ATIVAN) 0.5 MG tablet TAKE ONE TO TWO TABLETS BY MOUTH ONCE DAILY AS NEEDED FOR PANIC., R-1, Historical      !! ORDER FOR DME TENS unit including suppliesDisp-1 Device, R-0, Normal      !! ORDER FOR DME 1 TENS unitDisp-1 Device, R-0, Normal      !! ORDER FOR DME TENS UnitDisp-1 Device, R-0, Normal       !! - Potential duplicate  medications found. Please discuss with provider.          Instructions Given to Patient as Discharge:     Discharge Procedure Orders  Reason for your hospital stay   Order Comments: You were hospitalized due to a right breast abscess which was drained with surrounding cellulitis.  Your symptoms improved with IV antibiotics.     Follow-up and recommended labs and tests    Order Comments: Please follow up with PCP on 10/25 for recheck.     Activity   Order Comments: Your activity upon discharge: activity as tolerated   Order Specific Question Answer Comments   Is discharge order? Yes      When to contact your care team   Order Comments: Notify for fever >101.5; black or bloody stools; severe, persistent, or worsening nausea, vomiting, abdominal pain or distention, diarrhea, constipation; chest pain, difficulty breathing, swelling; dizziness, weakness, lightheadedness, fainting.  Proceed to the nearest emergency room for any urgent concerns.     Wound care and dressings   Order Comments: Instructions to care for your wound at home: daily dry gauze dressing change.  May change more often if dressing saturated with drainage.     Full Code     Diet   Order Comments: Follow this diet upon discharge: Orders Placed This Encounter     Regular Diet Adult   Order Specific Question Answer Comments   Is discharge order? Yes          Pending Tests at Discharge:   none    Discharge Disposition:   Discharged to home     Yasmin Ortiz MS, PA-C  Hospitalist Service  Pager [phone]    >30 minutes was spent in discharge planning, care coordination, physical examination and medication reconciliation on the date of discharge, 10/23/2017

## 2017-10-23 NOTE — PLAN OF CARE
Problem: Patient Care Overview  Goal: Discharge Needs Assessment  Outcome: Improving  Problem: Patient Care Overview  Goal: Discharge Needs Assessment  PRIMARY DIAGNOSIS: SOFT TISSUE INFECTIONS-cellulitis-rt breast  OUTPATIENT/OBSERVATION GOALS TO BE MET BEFORE DISCHARGE:  1. Vitals sign stable or return to baseline: Yes      2. Tolerating oral antibiotics or has home infusion set up if applicable: No, IV ancef every day      3. Pain status: Pain free      4. Return to near baseline physical activity: Yes      Discharge Planner Nurse   Safe discharge environment identified: Yes  Barriers to discharge: Not once medically cleared      Please review provider order for any additional goals.       Nurse to notify provider when observation goals have been met and patient is ready for discharge.          Patient is alert and oriented x4. VSS. Patient redness has spread outside of the line, but no swelling. Will pull wick at 10pm. Will give prophylactic pain medication prior to removal per patient request. Will continue to monitor, assess, and offer supportive cares.

## 2017-10-23 NOTE — PROGRESS NOTES
Removed wick from right breast incision. Patient tolerated well. Covered with 2x2 gauze. Directly around incision is still red and raised, but the redness that had spread, is no longer raised per patient report.

## 2017-10-23 NOTE — PLAN OF CARE
Problem: Patient Care Overview  Goal: Plan of Care/Patient Progress Review  PRIMARY DIAGNOSIS: SOFT TISSUE INFECTIONS-cellulitis-rt breast  OUTPATIENT/OBSERVATION GOALS TO BE MET BEFORE DISCHARGE:  1. Vitals sign stable or return to baseline: Yes      2. Tolerating oral antibiotics or has home infusion set up if applicable: No, IV ancef every day      3. Pain status: Pain free      4. Return to near baseline physical activity: Yes      Discharge Planner Nurse   Safe discharge environment identified: Yes  Barriers to discharge: yes-IV antibiotics       Please review provider order for any additional goals.       Nurse to notify provider when observation goals have been met and patient is ready for discharge.          Denies pain. Moving SBA. Plan for IV ancef- next due at 4 pm. SL in between. Right breast dressing is c/d/i covered with dry  guaze. Cellulitis marked- but outside marking slightly. Pink color. Plan to hopefully transition to orals. No fever. WBC 15. General surgery following.

## 2017-10-23 NOTE — PLAN OF CARE
Problem: Patient Care Overview  Goal: Plan of Care/Patient Progress Review  Outcome: Improving  Problem: Patient Care Overview  Goal: Plan of Care/Patient Progress Review  PRIMARY DIAGNOSIS: SOFT TISSUE INFECTIONS-cellulitis-rt breast  OUTPATIENT/OBSERVATION GOALS TO BE MET BEFORE DISCHARGE:  1. Vitals sign stable or return to baseline: Yes      2. Tolerating oral antibiotics or has home infusion set up if applicable: on scheduled IV antibiotics      3. Pain status: Pain free      4. Return to near baseline physical activity: Yes      Discharge Planner Nurse   Safe discharge environment identified: Yes  Barriers to discharge: yes-IV antibiotics       Please review provider order for any additional goals.       Nurse to notify provider when observation goals have been met and patient is ready for discharge.          Vitals stable. Pt alert and oriented x4. Indep with cares. Denies pain. On scheduled IV antibiotics. R breast dressing changed per MD request (pt allergic to current tape on dressing) and re-outlined. Pink in color. Pt resting comfortably. Plan for discharge later this morning. Will continue to monitor and provide supportive cares.

## 2017-10-23 NOTE — DISCHARGE INSTRUCTIONS
Daily dressing change at site; may change as often as needed if becomes saturated with drainage.    May followup with PCP or Surgery office to ensure site is healing appropriately.  Surgery office phone #[phone].

## 2017-10-23 NOTE — PROGRESS NOTES
Surgery-Chart Check  Continued improvement per nursing notes.  Tmax 99F.    Agree with daily dry gauze dressing change.  May change more often if dressing saturated with drainage.  Plan follow-up with PCP or Surgery office to ensure site healing well.  Contact info added to DC instructions.    DC ABX per Hospitalist.  Darcy Hanson PA-C

## 2017-10-23 NOTE — PLAN OF CARE
Problem: Patient Care Overview  Goal: Plan of Care/Patient Progress Review  PRIMARY DIAGNOSIS: SOFT TISSUE INFECTIONS-cellulitis-rt breast  OUTPATIENT/OBSERVATION GOALS TO BE MET BEFORE DISCHARGE:  1. Vitals sign stable or return to baseline: Yes      2. Tolerating oral antibiotics or has home infusion set up if applicable: No, IV ancef every day      3. Pain status: Pain free      4. Return to near baseline physical activity: Yes      Discharge Planner Nurse   Safe discharge environment identified: Yes  Barriers to discharge: yes-IV antibiotics       Please review provider order for any additional goals.       Nurse to notify provider when observation goals have been met and patient is ready for discharge.          Denies pain. Moving SBA. Plan for IV ancef- next due at 4 pm. Right breast dressing is c/d/i covered with dry  guaze. Cellulitis marked- but outside marking slightly. Pink color.

## 2017-10-23 NOTE — PLAN OF CARE
Problem: Patient Care Overview  Goal: Plan of Care/Patient Progress Review  Outcome: Adequate for Discharge Date Met:  10/23/17  Patient's After Visit Summary was reviewed with patient: Yes  Patient verbalized understanding of After Visit Summary, recommended follow up and was given an opportunity to ask questions.   Discharge medications sent home with patient/family: Yes  Discharged with self.     OBSERVATION patient END time: 1046

## 2017-10-24 ENCOUNTER — TELEPHONE (OUTPATIENT)
Dept: FAMILY MEDICINE | Facility: CLINIC | Age: 52
End: 2017-10-24

## 2017-10-24 NOTE — TELEPHONE ENCOUNTER
ED / Discharge Outreach Protocol    Patient Contact    Attempt # 1    Was call answered?  No.  Left message on voicemail with information to call me back.    Karen Watt RN   [facility] - Triage

## 2017-10-24 NOTE — TELEPHONE ENCOUNTER
Patient discharged from [facility] for inpatient hospital stay on 10/23 for abscess of right breast.    Appointment scheduled with Dr Weiler 10/25 - call for follow up if needed.    ER / IP:  0/0    Care Coordination:  sean Del Real

## 2017-10-25 ENCOUNTER — OFFICE VISIT (OUTPATIENT)
Dept: FAMILY MEDICINE | Facility: CLINIC | Age: 52
End: 2017-10-25
Payer: COMMERCIAL

## 2017-10-25 VITALS
BODY MASS INDEX: 33.65 KG/M2 | SYSTOLIC BLOOD PRESSURE: 112 MMHG | DIASTOLIC BLOOD PRESSURE: 86 MMHG | TEMPERATURE: 98.3 F | HEART RATE: 93 BPM | HEIGHT: 68 IN | WEIGHT: 222 LBS | OXYGEN SATURATION: 95 %

## 2017-10-25 DIAGNOSIS — Z23 NEED FOR PROPHYLACTIC VACCINATION AND INOCULATION AGAINST INFLUENZA: ICD-10-CM

## 2017-10-25 DIAGNOSIS — N61.1 ABSCESS OF RIGHT BREAST: Primary | ICD-10-CM

## 2017-10-25 PROCEDURE — 99495 TRANSJ CARE MGMT MOD F2F 14D: CPT | Performed by: FAMILY MEDICINE

## 2017-10-25 PROCEDURE — 90471 IMMUNIZATION ADMIN: CPT | Performed by: FAMILY MEDICINE

## 2017-10-25 PROCEDURE — 90686 IIV4 VACC NO PRSV 0.5 ML IM: CPT | Performed by: FAMILY MEDICINE

## 2017-10-25 RX ORDER — DESVENLAFAXINE 50 MG/1
TABLET, FILM COATED, EXTENDED RELEASE ORAL
Refills: 0 | COMMUNITY
Start: 2017-10-17 | End: 2019-11-01

## 2017-10-25 NOTE — PROGRESS NOTES
SUBJECTIVE:   Brea Aquino is a 52 year old female who presents to clinic today for the following health issues:    Hospital Follow-up Visit:    Hospital/Nursing Home/IP Rehab Facility: [facility]  Date of Admission: 10/22/17  Date of Discharge: 10/23/17  Reason(s) for Admission: Cellulitis             Problems taking medications regularly:  None       Medication changes since discharge: Cephalexin 500 mg 1 qid x7 ds and taking ibuprofen        Problems adhering to non-medication therapy:  None    Summary of hospitalization:  [facility] discharge summary reviewed  Diagnostic Tests/Treatments reviewed.  Follow up needed: none  Other Healthcare Providers Involved in Patient s Care:         None  Update since discharge: improved.    Post Discharge Medication Reconciliation: discharge medications reconciled, continue medications without change.  Plan of care communicated with patient     Coding guidelines for this visit:  Type of Medical   Decision Making Face-to-Face Visit       within 7 Days of discharge Face-to-Face Visit        within 14 days of discharge   Moderate Complexity [id number]   High Complexity [id number]          [facility] Admission on 10/22/17:    Pt was admitted to [facility] on 10/22/17 secondary to cellulitis of her right breast. Prior to hospital admission pt had pimple-like cyst on her right breast that was erythematous, swollen, and painful which prompted pt to go to the ER. At ER, they tried to aspirate fluid from area without success. Pt was given dose of Ancef in ER, she was admitted to the observation unit where no further I/D was recommended. She was then started on IV Ancef, WBC improved to 7, potassium was replaced, pt remained afebrile and hemodynamically stable, she was discharged on po Keflex and told to follow up with her PCP for recheck.    Today's visit (10/25/17):    Pt feels better, redness in her right breast has decreased,  "she does not have pain, mild drainage from right breast. No fevers or chills. Pt was discharged from hospital on po Keflex, she reports not having any problems being on it. Her appetite is improving.      Problem list and histories reviewed & adjusted, as indicated.  Additional history: as documented    Reviewed and updated as needed this visit by clinical staff  Tobacco  Allergies  Meds  Med Hx  Surg Hx  Fam Hx  Soc Hx      Reviewed and updated as needed this visit by Provider       ROS:  Constitutional, HEENT, cardiovascular, pulmonary, gi and gu systems are negative, except as otherwise noted.    This document serves as a record of the services and decisions personally performed and made by Karen Weiler, MD. It was created on her behalf by Royer Lui, a trained medical scribe. The creation of this document is based on the provider's statements to the medical scribe.  Royer Lui 12:51 PM October 25, 2017    OBJECTIVE:   /86  Pulse 93  Temp 98.3  F (36.8  C) (Oral)  Ht 5' 8\" (1.727 m)  Wt 222 lb (100.7 kg)  LMP 11/22/2006  SpO2 95%  BMI 33.75 kg/m2  Body mass index is 33.75 kg/(m^2).  GENERAL: healthy, alert and no distress  EYES: Eyes grossly normal to inspection, PERRL and conjunctivae and sclerae normal  HENT: ear canals and TM's normal, nose and mouth without ulcers or lesions  NECK: no adenopathy, no asymmetry, masses, or scars and thyroid normal to palpation  RESP: lungs clear to auscultation - no rales, rhonchi or wheezes  BREAST: Right breast: Scabbed over area at 9 o'clock position of right breast, mild induration, no warmth or drainage noted, non-tender, no fluid expressed.  CV: regular rate and rhythm, normal S1 S2, no S3 or S4, no murmur, click or rub, no peripheral edema and peripheral pulses strong  ABDOMEN: soft, nontender, no hepatosplenomegaly, no masses and bowel sounds normal  MS: no gross musculoskeletal defects noted, no edema  SKIN: no suspicious lesions or " rashes  NEURO: Normal strength and tone, mentation intact and speech normal  PSYCH: mentation appears normal, affect normal/bright  LYMPH: no cervical, supraclavicular, axillary, or inguinal adenopathy    Diagnostic Test Results From [facility] Admission:  Results for orders placed or performed during the hospital encounter of 10/22/17   POC US SOFT TISSUE    Impression    [facility] Procedure Note     Limited Bedside ED Ultrasound of Soft Tissue:    PROCEDURE: PERFORMED BY: Dr. Rafat Mejía  INDICATIONS/SYMPTOM: Skin redness, evaluate for abscess, cellulitis or foreign body  PROBE: High frequency linear probe  BODY LOCATION: Soft tissue located on right breast     FINDINGS: Cobblestoning of soft tissue: present   Hypoechoic fluid (ie abscess) identified: present       INTERPRETATION:  The soft tissue and muscle layers were evaluated.      Findings indicate cellulitis and abscess    IMAGE DOCUMENTATION: Images were archived to PACs system.       Basic metabolic panel   Result Value Ref Range    Sodium 137 133 - 144 mmol/L    Potassium 3.2 (L) 3.4 - 5.3 mmol/L    Chloride 105 94 - 109 mmol/L    Carbon Dioxide 23 20 - 32 mmol/L    Anion Gap 9 3 - 14 mmol/L    Glucose 89 70 - 99 mg/dL    Urea Nitrogen 6 (L) 7 - 30 mg/dL    Creatinine 0.64 0.52 - 1.04 mg/dL    GFR Estimate >90 >60 mL/min/1.7m2    GFR Estimate If Black >90 >60 mL/min/1.7m2    Calcium 8.7 8.5 - 10.1 mg/dL   CBC with platelets differential   Result Value Ref Range    WBC 15.0 (H) 4.0 - 11.0 10e9/L    RBC Count 4.78 3.8 - 5.2 10e12/L    Hemoglobin 15.5 11.7 - 15.7 g/dL    Hematocrit 45.6 35.0 - 47.0 %    MCV 95 78 - 100 fl    MCH 32.4 26.5 - 33.0 pg    MCHC 34.0 31.5 - 36.5 g/dL    RDW 13.0 10.0 - 15.0 %    Platelet Count 311 150 - 450 10e9/L    Diff Method Automated Method     % Neutrophils 60.3 %    % Lymphocytes 30.4 %    % Monocytes 7.1 %    % Eosinophils 1.4 %    % Basophils 0.4 %    % Immature Granulocytes 0.4 %    Nucleated RBCs 0 0  /100    Absolute Neutrophil 9.0 (H) 1.6 - 8.3 10e9/L    Absolute Lymphocytes 4.6 0.8 - 5.3 10e9/L    Absolute Monocytes 1.1 0.0 - 1.3 10e9/L    Absolute Eosinophils 0.2 0.0 - 0.7 10e9/L    Absolute Basophils 0.1 0.0 - 0.2 10e9/L    Abs Immature Granulocytes 0.1 0 - 0.4 10e9/L    Absolute Nucleated RBC 0.0    Potassium   Result Value Ref Range    Potassium 4.0 3.4 - 5.3 mmol/L   Magnesium   Result Value Ref Range    Magnesium 1.9 1.6 - 2.3 mg/dL   Basic metabolic panel   Result Value Ref Range    Sodium 139 133 - 144 mmol/L    Potassium 3.9 3.4 - 5.3 mmol/L    Chloride 106 94 - 109 mmol/L    Carbon Dioxide 27 20 - 32 mmol/L    Anion Gap 6 3 - 14 mmol/L    Glucose 90 70 - 99 mg/dL    Urea Nitrogen 7 7 - 30 mg/dL    Creatinine 0.86 0.52 - 1.04 mg/dL    GFR Estimate 70 >60 mL/min/1.7m2    GFR Estimate If Black 84 >60 mL/min/1.7m2    Calcium 8.4 (L) 8.5 - 10.1 mg/dL   CBC with platelets   Result Value Ref Range    WBC 7.0 4.0 - 11.0 10e9/L    RBC Count 4.47 3.8 - 5.2 10e12/L    Hemoglobin 14.6 11.7 - 15.7 g/dL    Hematocrit 43.2 35.0 - 47.0 %    MCV 97 78 - 100 fl    MCH 32.7 26.5 - 33.0 pg    MCHC 33.8 31.5 - 36.5 g/dL    RDW 13.2 10.0 - 15.0 %    Platelet Count 273 150 - 450 10e9/L   Blood culture   Result Value Ref Range    Specimen Description Blood Right Arm     Special Requests Aerobic and anaerobic bottles received     Culture Micro No growth    Blood culture   Result Value Ref Range    Specimen Description Blood Left Arm     Special Requests Aerobic and anaerobic bottles received     Culture Micro No growth        ASSESSMENT/PLAN:   (N61.1) Abscess of right breast  (primary encounter diagnosis)  Comment: healing well.  Continue the antibiotics. If not improving, consider surgery referral.    (Z23) Need for prophylactic vaccination and inoculation against influenza  Plan: FLU VAC, SPLIT VIRUS IM > 3 YO (QUADRIVALENT)         [14587], Vaccine Administration, Initial         [77934]            The information in  this document, created by the medical scribe for me, accurately reflects the services I personally performed and the decisions made by me. I have reviewed and approved this document for accuracy prior to leaving the patient care area.  October 25, 2017 12:51 PM    Karen Weiler, MD  [facility]    Injectable Influenza Immunization Documentation    1.  Is the person to be vaccinated sick today?   No    2. Does the person to be vaccinated have an allergy to a component   of the vaccine?   No  Egg Allergy Algorithm Link    3. Has the person to be vaccinated ever had a serious reaction   to influenza vaccine in the past?   No    4. Has the person to be vaccinated ever had Guillain-Barré syndrome?   No    Form completed by   Jazzy Lara Certified Medical Assistant

## 2017-10-25 NOTE — TELEPHONE ENCOUNTER
"  ED for acute condition Discharge Protocol    \"Hi, my name is Lindy Hood, a registered nurse, and I am calling from [facility].  I am calling to follow up and see how things are going for you after your recent emergency visit.\"    Tell me how you are doing now that you are home?\" I am doing       Discharge Instructions    \"Let's review your discharge instructions.  What is/are the follow-up recommendations?  Pt. Response: She was to take antibiotics and then see PCP    \"Has an appointment with your primary care provider been scheduled?\"  Yes. (confirm and remind to bring meds)  She has appt today      Medications    \"Tell me what changed about your medicines when you discharged?\"    Added antibiotics and no problems taking them    \"What questions do you have about your medications?\"   None          Call Summary    \"What questions or concerns do you have about your recent visit and your follow-up care?\"     none    \"If you have questions or things don't continue to improve, we encourage you contact us through the main clinic number (give number).  Even if the clinic is not open, triage nurses are available 24/7 to help you.     We would like you to know that our clinic has extended hours (provide information).  We also have urgent care (provide details on closest location and hours/contact info)\"    \"Thank you for your time and take care!\"                "

## 2017-10-25 NOTE — NURSING NOTE
"Chief Complaint   Patient presents with     Hospital F/U       Initial /86  Pulse 93  Temp 98.3  F (36.8  C) (Oral)  Ht 5' 8\" (1.727 m)  Wt 222 lb (100.7 kg)  LMP 11/22/2006  SpO2 95%  BMI 33.75 kg/m2 Estimated body mass index is 33.75 kg/(m^2) as calculated from the following:    Height as of this encounter: 5' 8\" (1.727 m).    Weight as of this encounter: 222 lb (100.7 kg).  Medication Reconciliation: complete   Jazzy Lara Certified Medical Assistant    "

## 2017-10-25 NOTE — MR AVS SNAPSHOT
"              After Visit Summary   10/25/2017    Brea Aquino    MRN: [id number]           Patient Information     Date Of Birth          1965        Visit Information        Provider Department      10/25/2017 11:40 AM Weiler, Karen, MD [facility] [place]        Today's Diagnoses     Abscess of right breast    -  1    Need for prophylactic vaccination and inoculation against influenza           Follow-ups after your visit        Who to contact     If you have questions or need follow up information about today's clinic visit or your schedule please contact [facility] [place] directly at [phone].  Normal or non-critical lab and imaging results will be communicated to you by [employer], letter or phone within 4 business days after the clinic has received the results. If you do not hear from us within 7 days, please contact the clinic through [employer] or phone. If you have a critical or abnormal lab result, we will notify you by phone as soon as possible.  Submit refill requests through [employer] or call your pharmacy and they will forward the refill request to us. Please allow 3 business days for your refill to be completed.          Additional Information About Your Visit        MyChart Information     [employer] gives you secure access to your electronic health record. If you see a primary care provider, you can also send messages to your care team and make appointments. If you have questions, please call your primary care clinic.  If you do not have a primary care provider, please call [phone] and they will assist you.        Care EveryWhere ID     This is your Care EveryWhere ID. This could be used by other organizations to access your [facility] medical records  [id number]        Your Vitals Were     Pulse Temperature Height Last Period Pulse Oximetry BMI (Body Mass Index)    93 98.3  F (36.8  C) (Oral) 5' 8\" (1.727 m) 11/22/2006 95% 33.75 kg/m2       Blood Pressure from Last 3 Encounters: "   10/25/17 112/86   10/23/17 132/83   06/28/17 130/72    Weight from Last 3 Encounters:   10/25/17 222 lb (100.7 kg)   10/22/17 215 lb (97.5 kg)   06/28/17 228 lb (103.4 kg)              We Performed the Following     FLU VAC, SPLIT VIRUS IM > 3 YO (QUADRIVALENT) [02923]     Vaccine Administration, Initial [[id number]]        Primary Care Provider Office Phone # Fax #    Karen Weiler, [phone] [address]  [place] [address]        Equal Access to Services     [facility]: Hadii mita huerta hadasho Soana rosa, waaxda luqadaha, qaybta kaalmada amador, javier roger . So [facility] [phone].    ATENCIÓN: Si habla español, tiene a singleton disposición servicios gratuitos de asistencia lingüística. [facility] [phone].    We comply with applicable federal civil rights laws and [place] laws. We do not discriminate on the basis of race, color, national origin, age, disability, sex, sexual orientation, or gender identity.            Thank you!     Thank you for choosing [facility]  for your care. Our goal is always to provide you with excellent care. Hearing back from our patients is one way we can continue to improve our services. Please take a few minutes to complete the written survey that you may receive in the mail after your visit with us. Thank you!             Your Updated Medication List - Protect others around you: Learn how to safely use, store and throw away your medicines at www.disposemymeds.org.          This list is accurate as of: 10/25/17 11:59 PM.  Always use your most recent med list.                   Brand Name Dispense Instructions for use Diagnosis    acetaminophen 325 MG tablet    TYLENOL    100 tablet    Take 2 tablets (650 mg) by mouth every 4 hours as needed for mild pain    Abscess of right breast, Cellulitis of right breast       BUSPIRONE HCL PO      Take 15 mg by mouth 2 times daily        cephALEXin 500 MG capsule    KEFLEX    28  capsule    Take 1 capsule (500 mg) by mouth 4 times daily for 7 days    Cellulitis of right breast       desvenlafaxine succinate 50 MG 24 hr tablet    PRISTIQ          estradiol 0.5 MG tablet    ESTRACE    90 tablet    TAKE 1 TABLET BY MOUTH DAILY    Symptomatic menopausal or female climacteric states       * hydrOXYzine 10 MG tablet    ATARAX     Take 10 mg by mouth 3 times daily as needed for anxiety        * HYDROXYZINE HCL PO      Take 25 mg by mouth nightly as needed for itching or other (sleep)        lamoTRIgine 150 MG tablet    LaMICtal     TK 1 T PO  QAM AND TK 1 QPM        LATUDA 60 MG Tabs tablet   Generic drug:  lurasidone      TAKE ONE TABLET BY MOUTH bedtime        LORazepam 0.5 MG tablet    ATIVAN     TAKE ONE TO TWO TABLETS BY MOUTH ONCE DAILY AS NEEDED FOR PANIC.        * order for DME     1 Device    TENS Unit    Neck pain       * order for DME     1 Device    1 TENS unit    Cervicalgia, Upper back pain, Tension-type headaches       * order for DME     1 Device    TENS unit including supplies    Cervicalgia       RABEprazole 20 MG EC tablet    ACIPHEX    90 tablet    Take 1 tablet (20 mg) by mouth daily    Gastroesophageal reflux disease without esophagitis       simvastatin 40 MG tablet    ZOCOR    90 tablet    Take 1 tablet (40 mg) by mouth At Bedtime    Hyperlipidemia LDL goal <130       * Notice:  This list has 5 medication(s) that are the same as other medications prescribed for you. Read the directions carefully, and ask your doctor or other care provider to review them with you.

## 2017-10-28 LAB
BACTERIA SPEC CULT: NO GROWTH
BACTERIA SPEC CULT: NO GROWTH
Lab: NORMAL
Lab: NORMAL
SPECIMEN SOURCE: NORMAL
SPECIMEN SOURCE: NORMAL

## 2017-12-08 ENCOUNTER — TELEPHONE (OUTPATIENT)
Dept: FAMILY MEDICINE | Facility: CLINIC | Age: 52
End: 2017-12-08

## 2017-12-08 NOTE — TELEPHONE ENCOUNTER
Proceed with PA as appears she has been on this since 2009.  Electronically Signed By: Albania Denton PA-C

## 2017-12-08 NOTE — TELEPHONE ENCOUNTER
PA phone number: [phone]  Patient ID: [id number]  Medication Requested: Rabeprazole DR 20mg  Dx:Gastroesophageal reflux without esophagitis K21.9  Requested by: Michelle in [place] on 13 and 42  T/f: pantoprazole 40mg from 12/9/2015-3/2/2016  Medication failed    Please advise on change med or initiate PA.    Jesica Chu

## 2017-12-13 DIAGNOSIS — E78.5 HYPERLIPIDEMIA LDL GOAL <130: ICD-10-CM

## 2017-12-13 NOTE — TELEPHONE ENCOUNTER
Requested Prescriptions   Pending Prescriptions Disp Refills     simvastatin (ZOCOR) 40 MG tablet  Last Written Prescription Date:  2/8/2017  Last Fill Quantity: 90 tablet,  # refills: 2   Last Office Visit with [facility], P or [facility] prescribing provider:  10/25/2017   Future Office Visit:      90 tablet 2     Sig: Take 1 tablet (40 mg) by mouth At Bedtime    Statins Protocol Passed    12/13/2017 11:05 AM       Passed - LDL on file in past 12 months    Recent Labs   Lab Test  06/28/17   1257   LDL  117*          Passed - No abnormal creatine kinase in past 12 months    No lab results found.         Passed - Recent or future visit with authorizing provider    Patient had office visit in the last year or has a visit in the next 30 days with authorizing provider.  See chart review.          Passed - Patient is age 18 or older       Passed - No active pregnancy on record       Passed - No positive pregnancy test in past 12 months

## 2017-12-14 RX ORDER — SIMVASTATIN 40 MG
40 TABLET ORAL AT BEDTIME
Qty: 90 TABLET | Refills: 1 | Status: SHIPPED | OUTPATIENT
Start: 2017-12-14 | End: 2018-06-20

## 2017-12-18 NOTE — TELEPHONE ENCOUNTER
Rcvd fax that PA was denied due to qty limit.  Plan allows 1 tablet per day for a maxiumum of 120 days within a 365 day time frame.  Prescriber, do you want to appeal or change medication?  Please advise.  Jesica Chu

## 2017-12-18 NOTE — TELEPHONE ENCOUNTER
Would advise OV for pt as I'm have never seen her before and am not sure what's tried in the past/indication for everything. Please notify and further decision can be made at that time.    Electronically Signed By: Albania Denton PA-C

## 2017-12-19 ENCOUNTER — OFFICE VISIT (OUTPATIENT)
Dept: FAMILY MEDICINE | Facility: CLINIC | Age: 52
End: 2017-12-19
Payer: COMMERCIAL

## 2017-12-19 VITALS
OXYGEN SATURATION: 97 % | HEART RATE: 94 BPM | WEIGHT: 222 LBS | BODY MASS INDEX: 33.65 KG/M2 | HEIGHT: 68 IN | DIASTOLIC BLOOD PRESSURE: 88 MMHG | SYSTOLIC BLOOD PRESSURE: 132 MMHG | TEMPERATURE: 98.3 F

## 2017-12-19 DIAGNOSIS — K21.9 GASTROESOPHAGEAL REFLUX DISEASE WITHOUT ESOPHAGITIS: Primary | ICD-10-CM

## 2017-12-19 DIAGNOSIS — Z23 NEED FOR TETANUS BOOSTER: ICD-10-CM

## 2017-12-19 DIAGNOSIS — F17.200 TOBACCO USE DISORDER: ICD-10-CM

## 2017-12-19 DIAGNOSIS — F31.81 BIPOLAR II DISORDER (H): ICD-10-CM

## 2017-12-19 PROCEDURE — 90471 IMMUNIZATION ADMIN: CPT | Performed by: PHYSICIAN ASSISTANT

## 2017-12-19 PROCEDURE — 99213 OFFICE O/P EST LOW 20 MIN: CPT | Mod: 25 | Performed by: PHYSICIAN ASSISTANT

## 2017-12-19 PROCEDURE — 90714 TD VACC NO PRESV 7 YRS+ IM: CPT | Performed by: PHYSICIAN ASSISTANT

## 2017-12-19 RX ORDER — OMEPRAZOLE 40 MG/1
40 CAPSULE, DELAYED RELEASE ORAL DAILY
Qty: 180 CAPSULE | Refills: 0 | Status: SHIPPED | OUTPATIENT
Start: 2017-12-19 | End: 2019-11-01

## 2017-12-19 NOTE — MR AVS SNAPSHOT
After Visit Summary   12/19/2017    Brea Aquino    MRN: [id number]           Patient Information     Date Of Birth          1965        Visit Information        Provider Department      12/19/2017 2:20 PM Albania Denton PA-C [facility]        Today's Diagnoses     Gastroesophageal reflux disease without esophagitis    -  1    Tobacco use disorder        Bipolar II disorder (H) - Followed by Psych CNP Demetrice Gonzalez at [facility] Instructions    Reassuring normal endoscopy in 2014.  Long discussion had regarding potential risk factors affiliated with long-term PPI therapy.  You likely have multiple life-style contributors to GERD including soda, spicy foods, alcohol use and smoking.  Pt willing to cut back on negative behaviors with re-check in 3 months.  Ok to stay on omeprazole in the meantime.  Need to consider taper at follow-up.     Electronically Signed By: Albania Denton PA-C            Follow-ups after your visit        Who to contact     If you have questions or need follow up information about today's clinic visit or your schedule please contact [facility] directly at [phone].  Normal or non-critical lab and imaging results will be communicated to you by OxiCoolhart, letter or phone within 4 business days after the clinic has received the results. If you do not hear from us within 7 days, please contact the clinic through RelayRidest or phone. If you have a critical or abnormal lab result, we will notify you by phone as soon as possible.  Submit refill requests through Liquid Grids or call your pharmacy and they will forward the refill request to us. Please allow 3 business days for your refill to be completed.          Additional Information About Your Visit        OxiCoolhart Information     Liquid Grids gives you secure access to your electronic health record. If you see a primary care provider, you can also send messages to your care  "team and make appointments. If you have questions, please call your primary care clinic.  If you do not have a primary care provider, please call [phone] and they will assist you.        Care EveryWhere ID     This is your Care EveryWhere ID. This could be used by other organizations to access your [facility] records  [id number]        Your Vitals Were     Pulse Temperature Height Last Period Pulse Oximetry Breastfeeding?    94 98.3  F (36.8  C) (Oral) 5' 8\" (1.727 m) 11/22/2006 97% No    BMI (Body Mass Index)                   33.75 kg/m2            Blood Pressure from Last 3 Encounters:   12/19/17 132/88   10/25/17 112/86   10/23/17 132/83    Weight from Last 3 Encounters:   12/19/17 222 lb (100.7 kg)   10/25/17 222 lb (100.7 kg)   10/22/17 215 lb (97.5 kg)              Today, you had the following     No orders found for display         Today's Medication Changes          These changes are accurate as of: 12/19/17  3:15 PM.  If you have any questions, ask your nurse or doctor.               Start taking these medicines.        Dose/Directions    omeprazole 40 MG capsule   Commonly known as:  priLOSEC   Used for:  Gastroesophageal reflux disease without esophagitis   Started by:  Albania Denton PA-C        Dose:  40 mg   Take 1 capsule (40 mg) by mouth daily Take 30-60 minutes before a meal.   Quantity:  180 capsule   Refills:  0            Where to get your medicines      These medications were sent to [facility] [address]  AT [facility] [address] [place] [address], [facility] [address]    Hours:  24-hours Phone:  [phone]     omeprazole 40 MG capsule                Primary Care Provider Office Phone # Fax #    Albania Denton PA-C [phone] [address] [place] [address]        Equal Access to Services     MARY JO DIAZ AH: Bryanna kearneyo Soana rosa, waaxda luqadashikha, qaybta kaalmalibby " javier englishrhina tran'aan ah. So [facility] [phone].    ATENCIÓN: Si sunshine mcdonald, tiene a singleton disposición servicios gratuitos de asistencia lingüística. Federico al [phone].    We comply with applicable federal civil rights laws and [place] laws. We do not discriminate on the basis of race, color, national origin, age, disability, sex, sexual orientation, or gender identity.            Thank you!     Thank you for choosing [facility]  for your care. Our goal is always to provide you with excellent care. Hearing back from our patients is one way we can continue to improve our services. Please take a few minutes to complete the written survey that you may receive in the mail after your visit with us. Thank you!             Your Updated Medication List - Protect others around you: Learn how to safely use, store and throw away your medicines at www.disposemymeds.org.          This list is accurate as of: 12/19/17  3:15 PM.  Always use your most recent med list.                   Brand Name Dispense Instructions for use Diagnosis    acetaminophen 325 MG tablet    TYLENOL    100 tablet    Take 2 tablets (650 mg) by mouth every 4 hours as needed for mild pain    Abscess of right breast, Cellulitis of right breast       BUSPIRONE HCL PO      Take 15 mg by mouth 2 times daily        desvenlafaxine succinate 50 MG 24 hr tablet    PRISTIQ          estradiol 0.5 MG tablet    ESTRACE    90 tablet    TAKE 1 TABLET BY MOUTH DAILY    Symptomatic menopausal or female climacteric states       * hydrOXYzine 10 MG tablet    ATARAX     Take 10 mg by mouth 3 times daily as needed for anxiety        * HYDROXYZINE HCL PO      Take 25 mg by mouth nightly as needed for itching or other (sleep)        lamoTRIgine 150 MG tablet    LaMICtal     TK 1 T PO  QAM AND TK 1 QPM        LATUDA 60 MG Tabs tablet   Generic drug:  lurasidone      TAKE ONE TABLET BY MOUTH bedtime        LORazepam 0.5 MG tablet     ATIVAN     TAKE ONE TO TWO TABLETS BY MOUTH ONCE DAILY AS NEEDED FOR PANIC.        omeprazole 40 MG capsule    priLOSEC    180 capsule    Take 1 capsule (40 mg) by mouth daily Take 30-60 minutes before a meal.    Gastroesophageal reflux disease without esophagitis       * order for DME     1 Device    TENS Unit    Neck pain       * order for DME     1 Device    1 TENS unit    Cervicalgia, Upper back pain, Tension-type headaches       * order for DME     1 Device    TENS unit including supplies    Cervicalgia       RABEprazole 20 MG EC tablet    ACIPHEX    90 tablet    Take 1 tablet (20 mg) by mouth daily    Gastroesophageal reflux disease without esophagitis       simvastatin 40 MG tablet    ZOCOR    90 tablet    Take 1 tablet (40 mg) by mouth At Bedtime    Hyperlipidemia LDL goal <130       * Notice:  This list has 5 medication(s) that are the same as other medications prescribed for you. Read the directions carefully, and ask your doctor or other care provider to review them with you.

## 2017-12-19 NOTE — PATIENT INSTRUCTIONS
Reassuring normal endoscopy in 2014.  Long discussion had regarding potential risk factors affiliated with long-term PPI therapy.  You likely have multiple life-style contributors to GERD including soda, spicy foods, alcohol use and smoking.  Pt willing to cut back on negative behaviors with re-check in 3 months.  Ok to stay on omeprazole in the meantime.  Need to consider taper at follow-up.     Electronically Signed By: Albania Denton PA-C

## 2017-12-19 NOTE — PROGRESS NOTES
"  SUBJECTIVE:   Brea Aquino is a 52 year old female who presents to clinic today for the following health issues:      GERD/Heartburn - insurance doesn't want to cover her medication or dosage - is currently taking Rabeprazole once daily. Was required to have a PA as insurance would not cover it.  Was told by the pharmacy that this would need to be done every 120 days.  Reports hx of hiatal hernia and believes this was the reason for being placed on acid reducer in the first place.  Believes had endoscopy at that time - has not had once since.  Reports she has been taking rabeprazole since 2009.  States she will get \"sick\" with burning heartburn and feel nauseated if she stops the     Has been taking a friends omeprazole script in the meantime and this has been \"pretty good\", but still has a little tiny bit of \"burning\" sensation.    Eats spicy food once awhile.  Takes ibuprofen maybe every couple weeks, but \"not a big user of that.\"  2-3 cans pop per day. Diet coke.   Sometime will eat beforetime.     Sochx: admits she lost her  1 yr ago and had drank more for a period of time, but states for the past 1 month she is down to having drinks 1 night per week. Estimates 6-7 vodka cocktail one drink.    Also followed by Psych for hx of bipolar. Demetrice Gonzalez at [facility]. States she was treated for depression for yrs, but then about 2-3 yrs ago received formal dx of bipolar.     Had normal EKG back in June.     Onset: has been ongoing issues for years    Description:     Burning in chest: YES    Intensity:     Progression of Symptoms: worse if she doesn't take medication. Has been temporarily taking omeprazole and it has been helping    Accompanying Signs & Symptoms:  Does it feel like food gets stuck: no  Nausea: YES  Vomiting (bloody?): no  Abdominal Pain: no  Black-Tarry stools: no:  Bloody stools: no    History: - has previously tried zantac as well as prilosec with no results and has been on " this medication since 2009  Previous ulcers: no    Precipitating factors:   Caffeine use: YES, 3-4 sodas a day  Alcohol use: YES, once a week  NSAID/Aspirin use: YES, once every couple of weeks  Tobacco use: YES  Worse with when she is on medication she doesn't have it at all but when she is off of the medication has reflux all the time.    Alleviating factors:       Therapies Tried and outcome:       Problem list and histories reviewed & adjusted, as indicated.  Additional history: as documented    Patient Active Problem List   Diagnosis     Tobacco use disorder     Endometriosis     Other congenital anomaly of uterus     MEDICAL HISTORY OF -     Mild recurrent major depression (H)     Hyperlipidemia LDL goal <130     Colon polyps     Tension-type headaches     Upper back pain     Cervicalgia     Anxiety     GERD (gastroesophageal reflux disease)     Cellulitis     Past Surgical History:   Procedure Laterality Date     C ANALGESIA,EPIDURAL,LABOR &        COLONOSCOPY  2014    Dr. Estrada [facility]     COLONOSCOPY       [place] [facility]     ESOPHAGOSCOPY, GASTROSCOPY, DUODENOSCOPY (EGD), COMBINED  2014    Procedure: COMBINED ESOPHAGOSCOPY, GASTROSCOPY, DUODENOSCOPY (EGD);  Colonoscopy, & EGD with polypectomy at sigmoid colon using hot snare;  Surgeon: Floyd Estrada MD;  Location: [facility]     HYSTERECTOMY, PAP STILL INDICATED  07    Laparoscopic Supracervical, benign path     LAPAROSCOPY PROCEDURE UNLISTED      For Endometriosis     SURGICAL HISTORY OF -   08    Incision of 2 infected cysts-axilla, and abdomen     SURGICAL HISTORY OF -   2010    Removal of Epidermoid Cyst in Rt. Axillary area     TUBAL LIGATION         Social History   Substance Use Topics     Smoking status: Current Every Day Smoker     Packs/day: 1.50     Years: 25.00     Types: Cigarettes     Smokeless tobacco: Never Used     Alcohol use 0.0 oz/week     0 Standard drinks or equivalent per week       Comment: 10 drinks per week      Family History   Problem Relation Age of Onset     CANCER Father      skin cancer     Substance Abuse Father      DIABETES Sister      Anxiety Disorder Sister      Hyperlipidemia Mother      Other Cancer Mother      Melanoma     Anesthesia Reaction Mother      C.A.D. Maternal Grandfather      MI at 88     Cancer - colorectal Maternal Grandfather      Breast Cancer Maternal Grandfather      Colon Cancer Maternal Grandfather      Substance Abuse Maternal Grandfather      Anxiety Disorder Daughter      DIABETES Sister      Anxiety Disorder Sister      DIABETES Maternal Grandmother      Asthma Maternal Grandmother      Breast Cancer Cousin      Breast Cancer Cousin      Other Cancer Paternal Grandmother      Anesthesia Reaction Sister          Current Outpatient Prescriptions   Medication Sig Dispense Refill     simvastatin (ZOCOR) 40 MG tablet Take 1 tablet (40 mg) by mouth At Bedtime 90 tablet 1     desvenlafaxine succinate (PRISTIQ) 50 MG 24 hr tablet   0     acetaminophen (TYLENOL) 325 MG tablet Take 2 tablets (650 mg) by mouth every 4 hours as needed for mild pain 100 tablet 0     BUSPIRONE HCL PO Take 15 mg by mouth 2 times daily       HYDROXYZINE HCL PO Take 25 mg by mouth nightly as needed for itching or other (sleep)       estradiol (ESTRACE) 0.5 MG tablet TAKE 1 TABLET BY MOUTH DAILY 90 tablet 1     lamoTRIgine (LAMICTAL) 150 MG tablet TK 1 T PO  QAM AND TK 1 QPM  1     LATUDA 60 MG TABS tablet TAKE ONE TABLET BY MOUTH bedtime  1     LORazepam (ATIVAN) 0.5 MG tablet TAKE ONE TO TWO TABLETS BY MOUTH ONCE DAILY AS NEEDED FOR PANIC.  1     RABEprazole (ACIPHEX) 20 MG EC tablet Take 1 tablet (20 mg) by mouth daily 90 tablet 1     hydrOXYzine (ATARAX) 10 MG tablet Take 10 mg by mouth 3 times daily as needed for anxiety        ORDER FOR DME TENS unit including supplies 1 Device 0     ORDER FOR DME 1 TENS unit 1 Device 0     ORDER FOR DME TENS Unit 1 Device 0     Allergies   Allergen  "Reactions     Adhesive Tape      Seasonal Allergies        Reviewed and updated as needed this visit by clinical staff     Reviewed and updated as needed this visit by Provider         ROS:  Constitutional, HEENT, cardiovascular, pulmonary, gi and gu systems are negative, except as otherwise noted.      OBJECTIVE:   /88  Pulse 94  Temp 98.3  F (36.8  C) (Oral)  Ht 5' 8\" (1.727 m)  Wt 222 lb (100.7 kg)  LMP 11/22/2006  SpO2 97%  Breastfeeding? No  BMI 33.75 kg/m2  Body mass index is 33.75 kg/(m^2).  GENERAL: healthy, alert and no distress  CV: regular rate and rhythm, normal S1 S2, no S3 or S4, no murmur, click or rub, no peripheral edema and peripheral pulses strong  ABDOMEN: soft, obese. Reports that will get burning sensation in epigastric region that radiates into her chest then throat.    Diagnostic Test Results:  Previous endoscopy from 2014 reviewed which was normal excluding hiatal hernia.  Normal EKG completed on 6/2017 as well.    ASSESSMENT/PLAN:         ICD-10-CM    1. Gastroesophageal reflux disease without esophagitis K21.9 omeprazole (PRILOSEC) 40 MG capsule   2. Tobacco use disorder F17.200    3. Bipolar II disorder (H) - Followed by Psych CNP Demetrice Gonzalez at [facility] F31.81    4. Need for tetanus booster Z23 TD (ADULT, 7+) PRESERVE FREE   Ok to resume omeprazole 40mg as that is what she is currently taking from a friend instead of rabeprazole as not covered by insurance.  Pt would like to start with optimizing GERD triggers first, but would advise starting slow taper of PPI therapy at follow-up. She is amenable to this.  See Patient Instructions  Patient Instructions   Reassuring normal endoscopy in 2014.  Long discussion had regarding potential risk factors affiliated with long-term PPI therapy.  You likely have multiple life-style contributors to GERD including soda, spicy foods, alcohol use and smoking.  Pt willing to cut back on negative behaviors with re-check in 3 " months.  Ok to stay on omeprazole in the meantime.  Need to consider taper at follow-up.     Electronically Signed By: Albania Denton PA-C        '

## 2017-12-19 NOTE — NURSING NOTE
"Chief Complaint   Patient presents with     Gastrophageal Reflux       Initial /88  Pulse 94  Temp 98.3  F (36.8  C) (Oral)  Ht 5' 8\" (1.727 m)  Wt 222 lb (100.7 kg)  LMP 11/22/2006  SpO2 97%  Breastfeeding? No  BMI 33.75 kg/m2 Estimated body mass index is 33.75 kg/(m^2) as calculated from the following:    Height as of this encounter: 5' 8\" (1.727 m).    Weight as of this encounter: 222 lb (100.7 kg).  Medication Reconciliation: complete    "

## 2017-12-20 ENCOUNTER — TELEPHONE (OUTPATIENT)
Dept: FAMILY MEDICINE | Facility: CLINIC | Age: 52
End: 2017-12-20

## 2017-12-20 DIAGNOSIS — K21.9 GASTROESOPHAGEAL REFLUX DISEASE WITHOUT ESOPHAGITIS: Primary | ICD-10-CM

## 2017-12-20 NOTE — TELEPHONE ENCOUNTER
Prior Authorization Retail Medication Request  Medication/Dose: Omeprazole 40mg capsules  Diagnosis and ICD code: Gastroesophageal reflux disease without esophagitis [K21.9]  New/Renewal/Insurance Change PA: New--qty limit override  Previously Tried and Failed Therapies:     Insurance ID (if provided): [id number]  Insurance Phone (if provided): [phone]    Any additional info from fax request:     If you received a fax notification from an outside Pharmacy:  Pharm[facility] [address]  Pharmacy #:[phone]  Pharmacy Fax:[phone]

## 2017-12-20 NOTE — TELEPHONE ENCOUNTER
PA Initiation    Medication: Omeprazole 40mg capsules- INITIATED  Insurance Company: [employer] [place] - Phone [phone] Fax [phone]  Pharmacy Filling the Rx: [email or web address] [address] [place] [address] [address] [facility] [address]  Filling Pharmacy Phone: [phone]  Filling Pharmacy Fax:    Start Date: 12/20/2017

## 2017-12-21 NOTE — TELEPHONE ENCOUNTER
Central Prior Authorization Team   Phone: [phone]      PRIOR AUTHORIZATION DENIED    Medication: Omeprazole 40mg capsules- Denied    Denial Date: 12/21/2017    Denial Rational:       Appeal Information:

## 2017-12-26 NOTE — TELEPHONE ENCOUNTER
Pt had been warned at prior appt that I also did not agree with the amount and duration she has been on PPIs, but she wished to continue this dose while working on diet modification. She can taper back to the 20mg once per day. If she has worsening she may need to pay for it out of pocket and we can taper her longer if needed. Encourage re-check with any issues. We had planned to taper her at next appt, but she didn't want to do it yet. See last note for details.  Electronically Signed By: Albania Denton PA-C

## 2017-12-26 NOTE — TELEPHONE ENCOUNTER
Please see below.  Pt has met the max her plan will allow for the year.  She can have 120 tablets in a 365 day time frame.  She will have to pay OOP.  Please advise on which OTC med she should take and route back to TC or RN to call pt.  Jesica Chu

## 2017-12-27 NOTE — TELEPHONE ENCOUNTER
Called and let pt know a different dose was called in.  I let her know that this may still not be covered as her plan only allows her to take 120 tablets of a PPI type medication in a 365 day time span, so her plan may not cover this until their 365 days is up.  She will let us know if there are any issues.  Jesica Chu

## 2017-12-27 NOTE — TELEPHONE ENCOUNTER
TC can you call Brea and let her know 20 mg RX sent to pharmacy for her. Thank you, Thu Santos R.N.

## 2017-12-27 NOTE — TELEPHONE ENCOUNTER
"I called Brea and spoke with her she says she does not understand and told me \"Dr Weiler did this for me.\" I informed her on her behalf Areli has done a PA and this was denied as she has met the max her plan will allow for the year. \"isn't there something you can do?\" I again advised her a PA was submitted, and this was denied by her insurance. I advised her to call her insurance company as this is a contract she has with them for benefit. I advised her she can pay OOP, and medication can be purchased OTC. She does not want to do this. Has decided will take option of dose change.     She says she needs this medication and will take the 20 mg per day.     Will route to Albania Satnos R.N.    "

## 2018-01-02 ENCOUNTER — TELEPHONE (OUTPATIENT)
Dept: FAMILY MEDICINE | Facility: CLINIC | Age: 53
End: 2018-01-02

## 2018-01-02 NOTE — TELEPHONE ENCOUNTER
Prior Authorization Retail Medication Request  Medication/Dose: omeprazole 20 Capsules   Diagnosis and ICD code:   New/Renewal/Insurance Change PA:   Previously Tried and Failed Therapies:     Insurance ID (if provided): [id number]  Insurance Phone (if provided): [phone]    Any additional info from fax request:     If you received a fax notification from an outside Pharmacy:  Pharmacy Name:[facility]  Pharmacy #:[phone]  Pharmacy Fax:[phone]

## 2018-01-02 NOTE — TELEPHONE ENCOUNTER
Central Prior Authorization Team   Phone: [phone]    PRIOR AUTHORIZATION DENIED    Medication: omeprazole-INITIATED - Denied    Denial Date: 1/2/2018    Denial Rational: Quantity limit          Appeal Information:

## 2018-01-02 NOTE — TELEPHONE ENCOUNTER
PA Initiation    Medication: omeprazole-INITIATED   Insurance Company: [employer] [place] - Phone [phone] Fax [phone]  Pharmacy Filling the Rx: [facility] [address] [place] [address] [address] [facility] [address]  Filling Pharmacy Phone: [phone]  Filling Pharmacy Fax: [phone]  Start Date: 1/2/2017

## 2018-01-04 NOTE — TELEPHONE ENCOUNTER
Pt was previously told her plan will not cover anymore of this type of medication and she will need to purchase them OTC.  She was told we would try one more rx, but that if it was not covered, she will need to buy OTC product.  Called pt left detailed message and let her know this one is also not covered and she will need to purchase OTC brand or make appt to discuss med change.  Jesica Chu

## 2018-03-09 DIAGNOSIS — N95.1 SYMPTOMATIC MENOPAUSAL OR FEMALE CLIMACTERIC STATES: ICD-10-CM

## 2018-03-09 RX ORDER — ESTRADIOL 0.5 MG/1
TABLET ORAL
Qty: 90 TABLET | Refills: 0 | Status: SHIPPED | OUTPATIENT
Start: 2018-03-09 | End: 2018-06-19

## 2018-03-09 NOTE — TELEPHONE ENCOUNTER
Prescription approved per [facility] RN Refill Protocol. Thu Santos R.N.

## 2018-03-09 NOTE — TELEPHONE ENCOUNTER
"Requested Prescriptions   Pending Prescriptions Disp Refills     estradiol (ESTRACE) 0.5 MG tablet [Pharmacy Med Name: ESTRADIOL 0.5MG TABLETS]  Last Written Prescription Date:  7/21/2017  Last Fill Quantity: 90 tablet,  # refills: 1   Last office visit: 12/19/2017 with prescribing provider:  Bertin   Future Office Visit:       90 tablet 0     Sig: TAKE 1 TABLET BY MOUTH DAILY    Hormone Replacement Therapy Passed    3/9/2018 10:23 AM       Passed - Blood pressure under 140/90 in past 12 months    BP Readings from Last 3 Encounters:   12/19/17 132/88   10/25/17 112/86   10/23/17 132/83                Passed - Recent (12 mo) or future (30 days) visit within the authorizing provider's specialty    Patient had office visit in the last year or has a visit in the next 30 days with authorizing provider.  See \"Patient Info\" tab in inbasket, or \"Choose Columns\" in Meds & Orders section of the refill encounter.            Passed - Patient has mammogram in past 2 years on file if age 50-75    Last Mammo:  7/19/2017       Passed - Patient is 18 years of age or older       Passed - No active pregnancy on record       Passed - No positive pregnancy test on record in past 12 months          "

## 2018-06-19 DIAGNOSIS — N95.1 SYMPTOMATIC MENOPAUSAL OR FEMALE CLIMACTERIC STATES: ICD-10-CM

## 2018-06-19 RX ORDER — ESTRADIOL 0.5 MG/1
TABLET ORAL
Qty: 90 TABLET | Refills: 0 | Status: SHIPPED | OUTPATIENT
Start: 2018-06-19 | End: 2019-11-01

## 2018-06-19 NOTE — TELEPHONE ENCOUNTER
"Requested Prescriptions   Pending Prescriptions Disp Refills     estradiol (ESTRACE) 0.5 MG tablet [Pharmacy Med Name: ESTRADIOL 0.5MG TABLETS]  Last Written Prescription Date:  3/9/2018  Last Fill Quantity: 90 tablet,  # refills: 0   Last office visit: 12/19/2017 with prescribing provider:  Bertin   Future Office Visit:       90 tablet 0     Sig: TAKE 1 TABLET BY MOUTH DAILY    Hormone Replacement Therapy Passed    6/19/2018  2:43 PM       Passed - Blood pressure under 140/90 in past 12 months    BP Readings from Last 3 Encounters:   12/19/17 132/88   10/25/17 112/86   10/23/17 132/83                Passed - Recent (12 mo) or future (30 days) visit within the authorizing provider's specialty    Patient had office visit in the last 12 months or has a visit in the next 30 days with authorizing provider or within the authorizing provider's specialty.  See \"Patient Info\" tab in inbasket, or \"Choose Columns\" in Meds & Orders section of the refill encounter.           Passed - Patient has mammogram in past 2 years on file if age 50-75    Last Mammo:  7/19/2017       Passed - Patient is 18 years of age or older       Passed - No active pregnancy on record       Passed - No positive pregnancy test on record in past 12 months          "

## 2018-06-19 NOTE — TELEPHONE ENCOUNTER
Prescription approved per [facility] Refill Protocol.  Tessa Langford, RN, BSN  [facility]

## 2018-06-20 DIAGNOSIS — E78.5 HYPERLIPIDEMIA LDL GOAL <130: ICD-10-CM

## 2018-06-20 RX ORDER — SIMVASTATIN 40 MG
40 TABLET ORAL AT BEDTIME
Qty: 30 TABLET | Refills: 0 | Status: SHIPPED | OUTPATIENT
Start: 2018-06-20 | End: 2018-07-20

## 2018-06-20 NOTE — TELEPHONE ENCOUNTER
Medication is being filled for 1 time refill only due to:  Patient needs labs due for follow up.  Thu Santos R.N.

## 2018-07-20 DIAGNOSIS — E78.5 HYPERLIPIDEMIA LDL GOAL <130: ICD-10-CM

## 2018-07-20 NOTE — TELEPHONE ENCOUNTER
"Requested Prescriptions   Pending Prescriptions Disp Refills     simvastatin (ZOCOR) 40 MG tablet [Pharmacy Med Name: SIMVASTATIN 40MG TABLETS]  Last Written Prescription Date:  6/20/2018  Last Fill Quantity: 30 tablet,  # refills: 0   Last office visit: 12/19/2017 with prescribing provider:  Bertin   Future Office Visit:     30 tablet 0     Sig: TAKE 1 TABLET BY MOUTH EVERY NIGHT AT BEDTIME    Statins Protocol Failed    7/20/2018  2:27 PM       Failed - LDL on file in past 12 months    Recent Labs   Lab Test  06/28/17   1257   LDL  117*          Passed - No abnormal creatine kinase in past 12 months    No lab results found.          Passed - Recent (12 mo) or future (30 days) visit within the authorizing provider's specialty    Patient had office visit in the last 12 months or has a visit in the next 30 days with authorizing provider or within the authorizing provider's specialty.  See \"Patient Info\" tab in inbasket, or \"Choose Columns\" in Meds & Orders section of the refill encounter.           Passed - Patient is age 18 or older       Passed - No active pregnancy on record       Passed - No positive pregnancy test in past 12 months          "

## 2018-07-24 RX ORDER — SIMVASTATIN 40 MG
40 TABLET ORAL AT BEDTIME
Qty: 30 TABLET | Refills: 0 | Status: SHIPPED | OUTPATIENT
Start: 2018-07-24 | End: 2018-09-05

## 2018-07-24 NOTE — TELEPHONE ENCOUNTER
Temp script - please notify pt due for fasting labs/appt prior to further refills.  Electronically Signed By: Albania Denton PA-C

## 2018-07-24 NOTE — TELEPHONE ENCOUNTER
Routing refill request to provider for review/approval because:  Sue given x1 and patient did not follow up, please advise  Labs not current:  Lipids  Tessa Langford RN, BSN  [facility]

## 2018-08-06 ENCOUNTER — OFFICE VISIT (OUTPATIENT)
Dept: FAMILY MEDICINE | Facility: CLINIC | Age: 53
End: 2018-08-06
Payer: COMMERCIAL

## 2018-08-06 VITALS
DIASTOLIC BLOOD PRESSURE: 82 MMHG | TEMPERATURE: 98.3 F | BODY MASS INDEX: 32.13 KG/M2 | WEIGHT: 212 LBS | HEIGHT: 68 IN | OXYGEN SATURATION: 95 % | HEART RATE: 82 BPM | SYSTOLIC BLOOD PRESSURE: 118 MMHG

## 2018-08-06 DIAGNOSIS — E78.5 HYPERLIPIDEMIA LDL GOAL <130: ICD-10-CM

## 2018-08-06 DIAGNOSIS — Z12.31 ENCOUNTER FOR SCREENING MAMMOGRAM FOR BREAST CANCER: ICD-10-CM

## 2018-08-06 DIAGNOSIS — Z13.1 SCREENING FOR DIABETES MELLITUS: ICD-10-CM

## 2018-08-06 DIAGNOSIS — Z72.0 TOBACCO ABUSE: ICD-10-CM

## 2018-08-06 DIAGNOSIS — Z00.00 ROUTINE GENERAL MEDICAL EXAMINATION AT A HEALTH CARE FACILITY: Primary | ICD-10-CM

## 2018-08-06 DIAGNOSIS — Z71.6 TOBACCO ABUSE COUNSELING: ICD-10-CM

## 2018-08-06 DIAGNOSIS — Z11.4 SCREENING FOR HIV (HUMAN IMMUNODEFICIENCY VIRUS): ICD-10-CM

## 2018-08-06 LAB
ANION GAP SERPL CALCULATED.3IONS-SCNC: 8 MMOL/L (ref 3–14)
BUN SERPL-MCNC: 11 MG/DL (ref 7–30)
CALCIUM SERPL-MCNC: 9.3 MG/DL (ref 8.5–10.1)
CHLORIDE SERPL-SCNC: 105 MMOL/L (ref 94–109)
CHOLEST SERPL-MCNC: 204 MG/DL
CO2 SERPL-SCNC: 26 MMOL/L (ref 20–32)
CREAT SERPL-MCNC: 0.93 MG/DL (ref 0.52–1.04)
GFR SERPL CREATININE-BSD FRML MDRD: 63 ML/MIN/1.7M2
GLUCOSE SERPL-MCNC: 97 MG/DL (ref 70–99)
HDLC SERPL-MCNC: 53 MG/DL
LDLC SERPL CALC-MCNC: 122 MG/DL
NONHDLC SERPL-MCNC: 151 MG/DL
POTASSIUM SERPL-SCNC: 4.1 MMOL/L (ref 3.4–5.3)
SODIUM SERPL-SCNC: 139 MMOL/L (ref 133–144)
TRIGL SERPL-MCNC: 147 MG/DL

## 2018-08-06 PROCEDURE — 36415 COLL VENOUS BLD VENIPUNCTURE: CPT | Performed by: FAMILY MEDICINE

## 2018-08-06 PROCEDURE — 80061 LIPID PANEL: CPT | Performed by: FAMILY MEDICINE

## 2018-08-06 PROCEDURE — 80048 BASIC METABOLIC PNL TOTAL CA: CPT | Performed by: FAMILY MEDICINE

## 2018-08-06 PROCEDURE — 87389 HIV-1 AG W/HIV-1&-2 AB AG IA: CPT | Performed by: FAMILY MEDICINE

## 2018-08-06 PROCEDURE — 99396 PREV VISIT EST AGE 40-64: CPT | Performed by: FAMILY MEDICINE

## 2018-08-06 RX ORDER — NICOTINE 21 MG/24HR
1 PATCH, TRANSDERMAL 24 HOURS TRANSDERMAL EVERY 24 HOURS
Qty: 28 PATCH | Refills: 5 | Status: SHIPPED | OUTPATIENT
Start: 2018-08-06 | End: 2020-01-16

## 2018-08-06 NOTE — PATIENT INSTRUCTIONS
Preventive Health Recommendations  Female Ages 50 - 64    Yearly exam: See your health care provider every year in order to  o Review health changes.   o Discuss preventive care.    o Review your medicines if your doctor has prescribed any.      Get a Pap test every three years (unless you have an abnormal result and your provider advises testing more often).    If you get Pap tests with HPV test, you only need to test every 5 years, unless you have an abnormal result.     You do not need a Pap test if your uterus was removed (hysterectomy) and you have not had cancer.    You should be tested each year for STDs (sexually transmitted diseases) if you're at risk.     Have a mammogram every 1 to 2 years.    Have a colonoscopy at age 50, or have a yearly FIT test (stool test). These exams screen for colon cancer.      Have a cholesterol test every 5 years, or more often if advised.    Have a diabetes test (fasting glucose) every three years. If you are at risk for diabetes, you should have this test more often.     If you are at risk for osteoporosis (brittle bone disease), think about having a bone density scan (DEXA).    Shots: Get a flu shot each year. Get a tetanus shot every 10 years.    Nutrition:     Eat at least 5 servings of fruits and vegetables each day.    Eat whole-grain bread, whole-wheat pasta and brown rice instead of white grains and rice.    Get adequate Calcium and Vitamin D.     Lifestyle    Exercise at least 150 minutes a week (30 minutes a day, 5 days a week). This will help you control your weight and prevent disease.    Limit alcohol to one drink per day.    No smoking.     Wear sunscreen to prevent skin cancer.     See your dentist every six months for an exam and cleaning.    See your eye doctor every 1 to 2 years.        Nicotine Gum (Nicorette, polacrilex nicotine gum)      Dosing:    >25 cigarettes per day Dose   Weeks 1-6 Chew 1 piece of 4 mg gum every 1-2 hours. Maximum of 24 pieces a  "day.   Weeks 7-9 Chew 1 piece of 4 mg gum every 2-4 hours. Maximum of 24 pieces a day.   Weeks 10-12 Chew 1 piece of 4 mg gum every 4-8 hours. Maximum of 24 pieces a day.   < 25 cigarettes per day    Weeks 1-6 Chew 1 piece of 2 mg gum every 1-2 hours. Maximum of 24 pieces a day.   Weeks 7-9 Chew 1 piece of 2 mg gum every 2-4 hours. Maximum of 24 pieces a day.   Weeks 10-12 Chew 1 piece of 2 mg gum every 4-8 hours. Maximum of 24 pieces a day.     How to use nicotine gum:    Chew the gum slowly until you notice a tingly feeling or peppery taste.     Then \"park\" the gum between your teeth and your cheek and let it sit there until you don't notice the tingly feeling or taste anymore.     Chew the gum slowly again until you notice the tingly feeling or peppery taste again and \"park\" the gum on the other side of your mouth.     Repeat this process for 30 minutes, then throw the gum away.     Especially at the beginning, use the gum whenever you would normally smoke a cigarette.    Some tips:    Do not smoke while you are using nicotine gum.     Do not swallow the gum.     Do not chew the gum like normal gum--you will swallow the nicotine instead of absorbing it. You are also more likely to get indigestion or nausea.     Do not drink anything, including water, while you are chewing gum.     Avoid acidic drinks like coffee and pop right before chewing the gum.     As your body becomes less dependent on nicotine, you will need to chew less gum.     Follow up with your health care provider if you have any questions and also to help taper your nicotine gum dose.    Side Effects:  Some people may experience some indigestion or nausea. Using proper chewing technique may help. If you experience any other troublesome or unusual side effects, call your health care provider.    Nicotine Patch    Dosing:    >10 cigarettes per day Dose   Weeks 1-6 Use one 21 mg patch per day.   Weeks 7-8 Use one 14 mg patch per day.   Weeks 9-10 Use " one 7 mg patch per day   <10 cigarettes per day  Weeks 1-6 Use one 14 mg patch per day   Weeks 7-8 Use one 7 mg patch per day       How to use the Nicotine Patch:    Apply a new patch to non-hairy, clean, dry skin on the upper body or upper outer arm.  Remove backing from patch and press on skin.  Hold for 10 seconds.    Apply patch around the same time every day.    Wash your hands after applying the patch.    Remove the patch at the end of the day before you go to bed.    Apply a new patch the next morning.    Do not apply the patch to an area where you have worn a patch in the last week.   This will help prevent or reduce skin irritation.    Some Tips:    Do not smoke while you are using the nicotine patch!    Do not cut the nicotine patch -it will be ineffective.    Remove the patch prior to receiving an MRI since the patch may contain some metal.    Do not put the patch on irritated, cut, or burned skin.    If the patch falls off and cannot be reapplied, put on a new patch.    Follow -up with your health care professional if you have any questions and also to help taper your nicotine patch dose.    Side Effects:  Some people experience some skin irritation where the patch was placed.  Moving around the site where you are putting the patch each day should help.  If you experience any other troublesome or unusual side effects, call your health care professional.            HOW TO QUIT SMOKING  Smoking is one of the hardest habits to break. About half of all those who have ever smoked have been able to quit, and most of those (about 70%) who still smoke want to quit. Here are some of the best ways to stop smoking.     KEEP TRYING:  It takes most smokers about 8 tries before they are finally able to fully quit. So, the more often you try and fail, the better your chance of quitting the next time! So, don't give up!    GO COLD TURKEY:  Most ex-smokers quit cold turkey. Trying to cut back gradually doesn't seem to  work as well, perhaps because it continues the smoking habit. Also, it is possible to fool yourself by inhaling more while smoking fewer cigarettes. This results in the same amount of nicotine in your body!    GET SUPPORT:  Support programs can make an important difference, especially for the heavy smoker. These groups offer lectures, methods to change your behavior and peer support. Call the free national Quitline for more information. 800-QUIT-NOW (296-425-4741). Low-cost or free programs are offered by many hospitals, local chapters of the American Lung Association (439-327-6126) and the American Cancer Society (912-471-3829). Support at home is important too. Non-smokers can help by offering praise and encouragement. If the smoker fails to quit, encourage them to try again!    OVER-THE-COUNTER MEDICINES:  For those who can't quit on their own, Nicotine Replacement Therapy (NRT) may make quitting much easier. Certain aids such as the nicotine patch, gum and lozenge are available without a prescription. However, it is best to use these under the guidance of your doctor. The skin patch provides a steady supply of nicotine to the body. Nicotine gum and lozenge gives temporary bursts of low levels of nicotine. Both methods take the edge off the craving for cigarettes. WARNING: If you feel symptoms of nicotine overdose, such as nausea, vomiting, dizziness, weakness, or fast heartbeat, stop using these and see your doctor.    PRESCRIPTION MEDICINES:  After evaluating your smoking patterns and prior attempts at quitting, your doctor may offer a prescription medicine such as bupropion (Zyban, Wellbutrin), varenicline (Chantix, Champix), a niocotine inhaler or nasal spray. Each has its unique advantage and side effects which your doctor can review with you.    HEALTH BENEFITS OF QUITTING:  The benefits of quitting start right away and keep improving the longer you go without smokin minutes: blood pressure and pulse  return to normal  8 hours: oxygen levels return to normal  2 days: ability to smell and taste begins to improve as damaged nerves start to regrow  2-3 weeks: circulation and lung function improves  1-9 months: decreased cough, congestion and shortness of breath; less tired  1 year: risk of heart attack decreases by half  5 years: risk of lung cancer decreases by half; risk of stroke becomes the same as a non-smoker  For information about how to quit smoking, visit the following links:  National [employer] ,   Clearing the Air, Quit Smoking Today   - an online booklet. http://www.smokefree.gov/pubs/clearing_the_air.pdf  Smokefree.gov http://smokefree.gov/  QuitNet http://www.quitnet.com/    8673-1119 [employer] Sahu, [address] [place] [address], [place] [address]. All rights reserved. This information is not intended as a substitute for professional medical care. Always follow your healthcare professional's instructions.    The Benefits of Living Smoke Free  What do you want to gain from quitting? Check off some reasons to quit.  Health Benefits  ___ Reduce my risk of lung cancer, heart disease, chronic lung disease  ___ Have fewer wrinkles and softer skin  ___ Improve my sense of taste and smell  ___ For pregnant women reduce the risk of having a miscarriage, stillbirth, premature birth, or low-birth-weight baby  Personal Benefits  ___ Feel more in control of my life  ___ Have better-smelling hair, breath, clothes, home, and car  ___ Save time by not having to take smoke breaks, buy cigarettes, or hunt for a light  ___ Have whiter teeth  Family Benefits  ___ Reduce my children s respiratory tract infections  ___ Set a good example for my children  ___ Reduce my family s cancer risk  Financial Benefits  ___ Save hundreds of dollars each year that would be spent on cigarettes  ___ Save money on medical bills  ___ Save on life, health, and car insurance premiums    Those Dollars Add Up!  Cigarettes are expensive, and  getting more expensive all the time. Do you realize how much money you are spending on cigarettes per year? What is the average amount you spend on a pack of cigarettes? What is the average number of packs that you smoke per day? Using your answers to these questions, fill in this formula to help you find out:  ($ _____ per pack) ×  ( _____ number of packs per day) × (365 days) =  $ _____ yearly cost of smoking  Besides tobacco, there are other costs, including extra cleaning bills and replacement costs for clothing and furniture; medical expenses for smoking-related illnesses; and higher health, life, and car insurance premiums.    Cigars and Pipes Count Too!  Cigars and pipes are also dangerous. So are smokeless (chewing) tobacco and snuff. All of these products contain nicotine, a highly addictive substance that has harmful effects on your body. Quitting smoking means giving up all tobacco products.      0057-2539 [facility], [address] [place] [address], [place] [address]. All rights reserved. This information is not intended as a substitute for professional medical care. Always follow your healthcare professional's instructions.

## 2018-08-06 NOTE — MR AVS SNAPSHOT
After Visit Summary   8/6/2018    Brea Aquino    MRN: [id number]           Patient Information     Date Of Birth          1965        Visit Information        Provider Department      8/6/2018 10:00 AM Abraham Adkins,  [facility], [place]        Today's Diagnoses     Routine general medical examination at a health care facility    -  1    Screening for HIV (human immunodeficiency virus)        Hyperlipidemia LDL goal <130        Screening for diabetes mellitus        Encounter for screening mammogram for breast cancer        Tobacco abuse        Tobacco abuse counseling          Care Instructions      Preventive Health Recommendations  Female Ages 50 - 64    Yearly exam: See your health care provider every year in order to  o Review health changes.   o Discuss preventive care.    o Review your medicines if your doctor has prescribed any.      Get a Pap test every three years (unless you have an abnormal result and your provider advises testing more often).    If you get Pap tests with HPV test, you only need to test every 5 years, unless you have an abnormal result.     You do not need a Pap test if your uterus was removed (hysterectomy) and you have not had cancer.    You should be tested each year for STDs (sexually transmitted diseases) if you're at risk.     Have a mammogram every 1 to 2 years.    Have a colonoscopy at age 50, or have a yearly FIT test (stool test). These exams screen for colon cancer.      Have a cholesterol test every 5 years, or more often if advised.    Have a diabetes test (fasting glucose) every three years. If you are at risk for diabetes, you should have this test more often.     If you are at risk for osteoporosis (brittle bone disease), think about having a bone density scan (DEXA).    Shots: Get a flu shot each year. Get a tetanus shot every 10 years.    Nutrition:     Eat at least 5 servings of fruits and vegetables each day.    Eat whole-grain bread,  "whole-wheat pasta and brown rice instead of white grains and rice.    Get adequate Calcium and Vitamin D.     Lifestyle    Exercise at least 150 minutes a week (30 minutes a day, 5 days a week). This will help you control your weight and prevent disease.    Limit alcohol to one drink per day.    No smoking.     Wear sunscreen to prevent skin cancer.     See your dentist every six months for an exam and cleaning.    See your eye doctor every 1 to 2 years.        Nicotine Gum (Nicorette, polacrilex nicotine gum)      Dosing:    >25 cigarettes per day Dose   Weeks 1-6 Chew 1 piece of 4 mg gum every 1-2 hours. Maximum of 24 pieces a day.   Weeks 7-9 Chew 1 piece of 4 mg gum every 2-4 hours. Maximum of 24 pieces a day.   Weeks 10-12 Chew 1 piece of 4 mg gum every 4-8 hours. Maximum of 24 pieces a day.   < 25 cigarettes per day    Weeks 1-6 Chew 1 piece of 2 mg gum every 1-2 hours. Maximum of 24 pieces a day.   Weeks 7-9 Chew 1 piece of 2 mg gum every 2-4 hours. Maximum of 24 pieces a day.   Weeks 10-12 Chew 1 piece of 2 mg gum every 4-8 hours. Maximum of 24 pieces a day.     How to use nicotine gum:    Chew the gum slowly until you notice a tingly feeling or peppery taste.     Then \"park\" the gum between your teeth and your cheek and let it sit there until you don't notice the tingly feeling or taste anymore.     Chew the gum slowly again until you notice the tingly feeling or peppery taste again and \"park\" the gum on the other side of your mouth.     Repeat this process for 30 minutes, then throw the gum away.     Especially at the beginning, use the gum whenever you would normally smoke a cigarette.    Some tips:    Do not smoke while you are using nicotine gum.     Do not swallow the gum.     Do not chew the gum like normal gum--you will swallow the nicotine instead of absorbing it. You are also more likely to get indigestion or nausea.     Do not drink anything, including water, while you are chewing gum.     Avoid " acidic drinks like coffee and pop right before chewing the gum.     As your body becomes less dependent on nicotine, you will need to chew less gum.     Follow up with your health care provider if you have any questions and also to help taper your nicotine gum dose.    Side Effects:  Some people may experience some indigestion or nausea. Using proper chewing technique may help. If you experience any other troublesome or unusual side effects, call your health care provider.    Nicotine Patch    Dosing:    >10 cigarettes per day Dose   Weeks 1-6 Use one 21 mg patch per day.   Weeks 7-8 Use one 14 mg patch per day.   Weeks 9-10 Use one 7 mg patch per day   <10 cigarettes per day  Weeks 1-6 Use one 14 mg patch per day   Weeks 7-8 Use one 7 mg patch per day       How to use the Nicotine Patch:    Apply a new patch to non-hairy, clean, dry skin on the upper body or upper outer arm.  Remove backing from patch and press on skin.  Hold for 10 seconds.    Apply patch around the same time every day.    Wash your hands after applying the patch.    Remove the patch at the end of the day before you go to bed.    Apply a new patch the next morning.    Do not apply the patch to an area where you have worn a patch in the last week.   This will help prevent or reduce skin irritation.    Some Tips:    Do not smoke while you are using the nicotine patch!    Do not cut the nicotine patch -it will be ineffective.    Remove the patch prior to receiving an MRI since the patch may contain some metal.    Do not put the patch on irritated, cut, or burned skin.    If the patch falls off and cannot be reapplied, put on a new patch.    Follow -up with your health care professional if you have any questions and also to help taper your nicotine patch dose.    Side Effects:  Some people experience some skin irritation where the patch was placed.  Moving around the site where you are putting the patch each day should help.  If you experience any  other troublesome or unusual side effects, call your health care professional.            HOW TO QUIT SMOKING  Smoking is one of the hardest habits to break. About half of all those who have ever smoked have been able to quit, and most of those (about 70%) who still smoke want to quit. Here are some of the best ways to stop smoking.     KEEP TRYING:  It takes most smokers about 8 tries before they are finally able to fully quit. So, the more often you try and fail, the better your chance of quitting the next time! So, don't give up!    GO COLD TURKEY:  Most ex-smokers quit cold turkey. Trying to cut back gradually doesn't seem to work as well, perhaps because it continues the smoking habit. Also, it is possible to fool yourself by inhaling more while smoking fewer cigarettes. This results in the same amount of nicotine in your body!    GET SUPPORT:  Support programs can make an important difference, especially for the heavy smoker. These groups offer lectures, methods to change your behavior and peer support. Call the free national Quitline for more information. 800-QUIT-NOW (592-666-8079). Low-cost or free programs are offered by many hospitals, local chapters of the American Lung Association (814-880-0626) and the American Cancer Society (681-280-8332). Support at home is important too. Non-smokers can help by offering praise and encouragement. If the smoker fails to quit, encourage them to try again!    OVER-THE-COUNTER MEDICINES:  For those who can't quit on their own, Nicotine Replacement Therapy (NRT) may make quitting much easier. Certain aids such as the nicotine patch, gum and lozenge are available without a prescription. However, it is best to use these under the guidance of your doctor. The skin patch provides a steady supply of nicotine to the body. Nicotine gum and lozenge gives temporary bursts of low levels of nicotine. Both methods take the edge off the craving for cigarettes. WARNING: If you feel  symptoms of nicotine overdose, such as nausea, vomiting, dizziness, weakness, or fast heartbeat, stop using these and see your doctor.    PRESCRIPTION MEDICINES:  After evaluating your smoking patterns and prior attempts at quitting, your doctor may offer a prescription medicine such as bupropion (Zyban, Wellbutrin), varenicline (Chantix, Champix), a niocotine inhaler or nasal spray. Each has its unique advantage and side effects which your doctor can review with you.    HEALTH BENEFITS OF QUITTING:  The benefits of quitting start right away and keep improving the longer you go without smokin minutes: blood pressure and pulse return to normal  8 hours: oxygen levels return to normal  2 days: ability to smell and taste begins to improve as damaged nerves start to regrow  2-3 weeks: circulation and lung function improves  1-9 months: decreased cough, congestion and shortness of breath; less tired  1 year: risk of heart attack decreases by half  5 years: risk of lung cancer decreases by half; risk of stroke becomes the same as a non-smoker  For information about how to quit smoking, visit the following links:  National Cancer [place] ,   Clearing the Air, Quit Smoking Today   - an online booklet. http://www.smokefree.gov/pubs/clearing_the_air.pdf  Smokefree.gov http://smokefree.gov/  QuitNet http://www.quitnet.com/    9868-2240 [employer], [address] [place] [address]. All rights reserved. This information is not intended as a substitute for professional medical care. Always follow your healthcare professional's instructions.    The Benefits of Living Smoke Free  What do you want to gain from quitting? Check off some reasons to quit.  Health Benefits  ___ Reduce my risk of lung cancer, heart disease, chronic lung disease  ___ Have fewer wrinkles and softer skin  ___ Improve my sense of taste and smell  ___ For pregnant women--reduce the risk of having a miscarriage, stillbirth, premature  birth, or low-birth-weight baby  Personal Benefits  ___ Feel more in control of my life  ___ Have better-smelling hair, breath, clothes, home, and car  ___ Save time by not having to take smoke breaks, buy cigarettes, or hunt for a light  ___ Have whiter teeth  Family Benefits  ___ Reduce my children s respiratory tract infections  ___ Set a good example for my children  ___ Reduce my family s cancer risk  Financial Benefits  ___ Save hundreds of dollars each year that would be spent on cigarettes  ___ Save money on medical bills  ___ Save on life, health, and car insurance premiums    Those Dollars Add Up!  Cigarettes are expensive, and getting more expensive all the time. Do you realize how much money you are spending on cigarettes per year? What is the average amount you spend on a pack of cigarettes? What is the average number of packs that you smoke per day? Using your answers to these questions, fill in this formula to help you find out:  ($ _____ per pack) ×  ( _____ number of packs per day) × (365 days) =  $ _____ yearly cost of smoking  Besides tobacco, there are other costs, including extra cleaning bills and replacement costs for clothing and furniture; medical expenses for smoking-related illnesses; and higher health, life, and car insurance premiums.    Cigars and Pipes Count Too!  Cigars and pipes are also dangerous. So are smokeless (chewing) tobacco and snuff. All of these products contain nicotine, a highly addictive substance that has harmful effects on your body. Quitting smoking means giving up all tobacco products.      3207-4143 [facility], [address] [place] [address], [place] [address]. All rights reserved. This information is not intended as a substitute for professional medical care. Always follow your healthcare professional's instructions.          Follow-ups after your visit        Follow-up notes from your care team     Return in about 6 weeks (around 9/17/2018) for smoking cessation.     "  Future tests that were ordered for you today     Open Future Orders        Priority Expected Expires Ordered    *MA Screening Digital Bilateral Routine  8/6/2019 8/6/2018            Who to contact     If you have questions or need follow up information about today's clinic visit or your schedule please contact [facility] directly at [phone].  Normal or non-critical lab and imaging results will be communicated to you by MyChart, letter or phone within 4 business days after the clinic has received the results. If you do not hear from us within 7 days, please contact the clinic through [facility] or phone. If you have a critical or abnormal lab result, we will notify you by phone as soon as possible.  Submit refill requests through [facility] or call your pharmacy and they will forward the refill request to us. Please allow 3 business days for your refill to be completed.          Additional Information About Your Visit        [facility] Information     [facility] gives you secure access to your electronic health record. If you see a primary care provider, you can also send messages to your care team and make appointments. If you have questions, please call your primary care clinic.  If you do not have a primary care provider, please call [phone] and they will assist you.        Care EveryWhere ID     This is your Care EveryWhere ID. This could be used by other organizations to access your [place] medical records  [id number]        Your Vitals Were     Pulse Temperature Height Last Period Pulse Oximetry BMI (Body Mass Index)    82 98.3  F (36.8  C) (Oral) 5' 8\" (1.727 m) 11/22/2006 95% 32.23 kg/m2       Blood Pressure from Last 3 Encounters:   08/06/18 118/82   12/19/17 132/88   10/25/17 112/86    Weight from Last 3 Encounters:   08/06/18 212 lb (96.2 kg)   12/19/17 222 lb (100.7 kg)   10/25/17 222 lb (100.7 kg)              We Performed the Following     Basic metabolic panel     DEPRESSION ACTION PLAN " (DAP)     HIV Screening     Lipid panel reflex to direct LDL Fasting     Tobacco Cessation - Order to Satisfy Health Maintenance          Today's Medication Changes          These changes are accurate as of 8/6/18 10:26 AM.  If you have any questions, ask your nurse or doctor.               Start taking these medicines.        Dose/Directions    nicotine 21 MG/24HR 24 hr patch   Commonly known as:  NICODERM CQ   Used for:  Tobacco abuse   Started by:  Abraham Adkins DO        Dose:  1 patch   Place 1 patch onto the skin every 24 hours   Quantity:  28 patch   Refills:  5       nicotine polacrilex 2 MG gum   Commonly known as:  NICORETTE   Used for:  Tobacco abuse   Started by:  Abraham Adkins DO        Dose:  2 mg   Place 1 each (2 mg) inside cheek as needed for smoking cessation Place 1 each inside cheek as needed for smoking cessation   Quantity:  100 tablet   Refills:  5         These medicines have changed or have updated prescriptions.        Dose/Directions    omeprazole 40 MG capsule   Commonly known as:  priLOSEC   This may have changed:  Another medication with the same name was removed. Continue taking this medication, and follow the directions you see here.   Used for:  Gastroesophageal reflux disease without esophagitis   Changed by:  Abraham Adkins DO        Dose:  40 mg   Take 1 capsule (40 mg) by mouth daily Take 30-60 minutes before a meal.   Quantity:  180 capsule   Refills:  0         Stop taking these medicines if you haven't already. Please contact your care team if you have questions.     acetaminophen 325 MG tablet   Commonly known as:  TYLENOL   Stopped by:  Abraham Adkins DO                Where to get your medicines      These medications were sent to [facility] [address] [address] AT [facility] [address] [address], [facility] [address]    Hours:  24-hours Phone:  [phone]     nicotine 21 MG/24HR 24 hr patch    nicotine  polacrilex 2 MG gum                Primary Care Provider Office Phone # Fax #    Al[place]ia Denton PA-C [phone] [address] [facility] [address]        Equal Access to Services     JUDSONFATEMEH JOE AH: Hadii aad ku hadasho Soomaali, waaxda luqadaha, qaybta kaalmada adeegyada, waxay idiin hayaan aderhina silva laerikaalexandre . So [facility] [phone].    ATENCIÓN: Si habla español, tiene a singleton disposición servicios gratuitos de asistencia lingüística. [facility] [phone].    We comply with applicable federal civil rights laws and [place] laws. We do not discriminate on the basis of race, color, national origin, age, disability, sex, sexual orientation, or gender identity.            Thank you!     Thank you for choosing [facility]  for your care. Our goal is always to provide you with excellent care. Hearing back from our patients is one way we can continue to improve our services. Please take a few minutes to complete the written survey that you may receive in the mail after your visit with us. Thank you!             Your Updated Medication List - Protect others around you: Learn how to safely use, store and throw away your medicines at www.disposemymeds.org.          This list is accurate as of 8/6/18 10:26 AM.  Always use your most recent med list.                   Brand Name Dispense Instructions for use Diagnosis    BUSPIRONE HCL PO      Take 15 mg by mouth 2 times daily        desvenlafaxine succinate 50 MG 24 hr tablet    PRISTIQ          estradiol 0.5 MG tablet    ESTRACE    90 tablet    TAKE 1 TABLET BY MOUTH DAILY    Symptomatic menopausal or female climacteric states       * hydrOXYzine 10 MG tablet    ATARAX     Take 10 mg by mouth 3 times daily as needed for anxiety        * HYDROXYZINE HCL PO      Take 25 mg by mouth nightly as needed for itching or other (sleep)        lamoTRIgine 150 MG tablet    LaMICtal     TK 1 T PO  QAM AND TK 1 QPM        LATUDA 60 MG Tabs tablet    Generic drug:  lurasidone      TAKE ONE TABLET BY MOUTH bedtime        LORazepam 0.5 MG tablet    ATIVAN     TAKE ONE TO TWO TABLETS BY MOUTH ONCE DAILY AS NEEDED FOR PANIC.        nicotine 21 MG/24HR 24 hr patch    NICODERM CQ    28 patch    Place 1 patch onto the skin every 24 hours    Tobacco abuse       nicotine polacrilex 2 MG gum    NICORETTE    100 tablet    Place 1 each (2 mg) inside cheek as needed for smoking cessation Place 1 each inside cheek as needed for smoking cessation    Tobacco abuse       omeprazole 40 MG capsule    priLOSEC    180 capsule    Take 1 capsule (40 mg) by mouth daily Take 30-60 minutes before a meal.    Gastroesophageal reflux disease without esophagitis       * order for DME     1 Device    TENS Unit    Neck pain       * order for DME     1 Device    1 TENS unit    Cervicalgia, Upper back pain, Tension-type headaches       * order for DME     1 Device    TENS unit including supplies    Cervicalgia       simvastatin 40 MG tablet    ZOCOR    30 tablet    Take 1 tablet (40 mg) by mouth At Bedtime Needs appt/fasting lab work prior to further refills.    Hyperlipidemia LDL goal <130       * Notice:  This list has 5 medication(s) that are the same as other medications prescribed for you. Read the directions carefully, and ask your doctor or other care provider to review them with you.

## 2018-08-06 NOTE — LETTER
My Depression Action Plan  Name: Brea Aquino   Date of Birth 1965  Date: 8/6/2018    My doctor: Albania Denton   My clinic: [facility] [place]  [address]  [place] [id number] [phone]          GREEN    ZONE   Good Control    What it looks like:     Things are going generally well. You have normal up s and down s. You may even feel depressed from time to time, but bad moods usually last less than a day.   What you need to do:  1. Continue to care for yourself (see self care plan)  2. Check your depression survival kit and update it as needed  3. Follow your physician s recommendations including any medication.  4. Do not stop taking medication unless you consult with your physician first.           YELLOW         ZONE Getting Worse    What it looks like:     Depression is starting to interfere with your life.     It may be hard to get out of bed; you may be starting to isolate yourself from others.    Symptoms of depression are starting to last most all day and this has happened for several days.     You may have suicidal thoughts but they are not constant.   What you need to do:     1. Call your care team, your response to treatment will improve if you keep your care team informed of your progress. Yellow periods are signs an adjustment may need to be made.     2. Continue your self-care, even if you have to fake it!    3. Talk to someone in your support network    4. Open up your depression survival kit           RED    ZONE Medical Alert - Get Help    What it looks like:     Depression is seriously interfering with your life.     You may experience these or other symptoms: You can t get out of bed most days, can t work or engage in other necessary activities, you have trouble taking care of basic hygiene, or basic responsibilities, thoughts of suicide or death that will not go away, self-injurious behavior.     What you need to do:  1. Call your care team and  request a same-day appointment. If they are not available (weekends or after hours) call your local crisis line, emergency room or 911.            Depression Self Care Plan / Survival Kit    Self-Care for Depression  Here s the deal. Your body and mind are really not as separate as most people think.  What you do and think affects how you feel and how you feel influences what you do and think. This means if you do things that people who feel good do, it will help you feel better.  Sometimes this is all it takes.  There is also a place for medication and therapy depending on how severe your depression is, so be sure to consult with your medical provider and/ or Behavioral Health Consultant if your symptoms are worsening or not improving.     In order to better manage my stress, I will:    Exercise  Get some form of exercise, every day. This will help reduce pain and release endorphins, the  feel good  chemicals in your brain. This is almost as good as taking antidepressants!  This is not the same as joining a gym and then never going! (they count on that by the way ) It can be as simple as just going for a walk or doing some gardening, anything that will get you moving.      Hygiene   Maintain good hygiene (Get out of bed in the morning, Make your bed, Brush your teeth, Take a shower, and Get dressed like you were going to work, even if you are unemployed).  If your clothes don't fit try to get ones that do.    Diet  I will strive to eat foods that are good for me, drink plenty of water, and avoid excessive sugar, caffeine, alcohol, and other mood-altering substances.  Some foods that are helpful in depression are: complex carbohydrates, B vitamins, flaxseed, fish or fish oil, fresh fruits and vegetables.    Psychotherapy  I agree to participate in Individual Therapy (if recommended).    Medication  If prescribed medications, I agree to take them.  Missing doses can result in serious side effects.  I understand that  drinking alcohol, or other illicit drug use, may cause potential side effects.  I will not stop my medication abruptly without first discussing it with my provider.    Staying Connected With Others  I will stay in touch with my friends, family members, and my primary care provider/team.    Use your imagination  Be creative.  We all have a creative side; it doesn t matter if it s oil painting, sand castles, or mud pies! This will also kick up the endorphins.    Witness Beauty  (AKA stop and smell the roses) Take a look outside, even in mid-winter. Notice colors, textures. Watch the squirrels and birds.     Service to others  Be of service to others.  There is always someone else in need.  By helping others we can  get out of ourselves  and remember the really important things.  This also provides opportunities for practicing all the other parts of the program.    Humor  Laugh and be silly!  Adjust your TV habits for less news and crime-drama and more comedy.    Control your stress  Try breathing deep, massage therapy, biofeedback, and meditation. Find time to relax each day.     My support system    Clinic Contact:  Phone number:    Contact 1:  Phone number:    Contact 2:  Phone number:    Mu-ism/:  Phone number:    Therapist:  Phone number:    Local crisis center:    Phone number:    Other community support:  Phone number:
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
## 2018-08-07 ASSESSMENT — PATIENT HEALTH QUESTIONNAIRE - PHQ9: SUM OF ALL RESPONSES TO PHQ QUESTIONS 1-9: 9

## 2018-08-08 LAB — HIV 1+2 AB+HIV1 P24 AG SERPL QL IA: NONREACTIVE

## 2018-09-05 DIAGNOSIS — E78.5 HYPERLIPIDEMIA LDL GOAL <130: ICD-10-CM

## 2018-09-05 RX ORDER — SIMVASTATIN 40 MG
TABLET ORAL
Qty: 30 TABLET | Refills: 11 | Status: SHIPPED | OUTPATIENT
Start: 2018-09-05 | End: 2019-09-19

## 2018-09-05 NOTE — TELEPHONE ENCOUNTER
Filled per [facility] protocol. LOV 8/6/2018:    Hyperlipidemia LDL goal <130     Sharifa Montes RN  [facility]

## 2018-11-07 ENCOUNTER — RADIANT APPOINTMENT (OUTPATIENT)
Dept: MAMMOGRAPHY | Facility: CLINIC | Age: 53
End: 2018-11-07
Attending: FAMILY MEDICINE
Payer: COMMERCIAL

## 2018-11-07 DIAGNOSIS — Z12.31 ENCOUNTER FOR SCREENING MAMMOGRAM FOR BREAST CANCER: ICD-10-CM

## 2018-11-07 PROCEDURE — 77067 SCR MAMMO BI INCL CAD: CPT | Mod: TC

## 2018-11-19 ENCOUNTER — HOSPITAL ENCOUNTER (OUTPATIENT)
Dept: ULTRASOUND IMAGING | Facility: CLINIC | Age: 53
End: 2018-11-19
Attending: FAMILY MEDICINE
Payer: COMMERCIAL

## 2018-11-19 ENCOUNTER — HOSPITAL ENCOUNTER (OUTPATIENT)
Dept: MAMMOGRAPHY | Facility: CLINIC | Age: 53
Discharge: HOME | End: 2018-11-19
Attending: FAMILY MEDICINE | Admitting: FAMILY MEDICINE
Payer: COMMERCIAL

## 2018-11-19 DIAGNOSIS — R92.8 ABNORMAL MAMMOGRAM: ICD-10-CM

## 2018-11-19 PROCEDURE — 76642 ULTRASOUND BREAST LIMITED: CPT | Mod: RT

## 2018-11-19 PROCEDURE — G0279 TOMOSYNTHESIS, MAMMO: HCPCS

## 2019-01-01 NOTE — LETTER
My Depression Action Plan  Name: Brea Aquino   Date of Birth 1965  Date: 6/28/2017    My doctor: Weiler, Karen   My clinic: [facility] [place]  [address]  [facility] [address] [phone]          GREEN    ZONE   Good Control    What it looks like:     Things are going generally well. You have normal up s and down s. You may even feel depressed from time to time, but bad moods usually last less than a day.   What you need to do:  1. Continue to care for yourself (see self care plan)  2. Check your depression survival kit and update it as needed  3. Follow your physician s recommendations including any medication.  4. Do not stop taking medication unless you consult with your physician first.           YELLOW         ZONE Getting Worse    What it looks like:     Depression is starting to interfere with your life.     It may be hard to get out of bed; you may be starting to isolate yourself from others.    Symptoms of depression are starting to last most all day and this has happened for several days.     You may have suicidal thoughts but they are not constant.   What you need to do:     1. Call your care team, your response to treatment will improve if you keep your care team informed of your progress. Yellow periods are signs an adjustment may need to be made.     2. Continue your self-care, even if you have to fake it!    3. Talk to someone in your support network    4. Open up your depression survival kit           RED    ZONE Medical Alert - Get Help    What it looks like:     Depression is seriously interfering with your life.     You may experience these or other symptoms: You can t get out of bed most days, can t work or engage in other necessary activities, you have trouble taking care of basic hygiene, or basic responsibilities, thoughts of suicide or death that will not go away, self-injurious behavior.     What you need to do:  1. Call your care team and request a  same-day appointment. If they are not available (weekends or after hours) call your local crisis line, emergency room or 911.      Electronically signed by: Jazzy Lara, June 28, 2017    Depression Self Care Plan / Survival Kit    Self-Care for Depression  Here s the deal. Your body and mind are really not as separate as most people think.  What you do and think affects how you feel and how you feel influences what you do and think. This means if you do things that people who feel good do, it will help you feel better.  Sometimes this is all it takes.  There is also a place for medication and therapy depending on how severe your depression is, so be sure to consult with your medical provider and/ or Behavioral Health Consultant if your symptoms are worsening or not improving.     In order to better manage my stress, I will:    Exercise  Get some form of exercise, every day. This will help reduce pain and release endorphins, the  feel good  chemicals in your brain. This is almost as good as taking antidepressants!  This is not the same as joining a gym and then never going! (they count on that by the way ) It can be as simple as just going for a walk or doing some gardening, anything that will get you moving.      Hygiene   Maintain good hygiene (Get out of bed in the morning, Make your bed, Brush your teeth, Take a shower, and Get dressed like you were going to work, even if you are unemployed).  If your clothes don't fit try to get ones that do.    Diet  I will strive to eat foods that are good for me, drink plenty of water, and avoid excessive sugar, caffeine, alcohol, and other mood-altering substances.  Some foods that are helpful in depression are: complex carbohydrates, B vitamins, flaxseed, fish or fish oil, fresh fruits and vegetables.    Psychotherapy  I agree to participate in Individual Therapy (if recommended).    Medication  If prescribed medications, I agree to take them.  Missing doses can result in  serious side effects.  I understand that drinking alcohol, or other illicit drug use, may cause potential side effects.  I will not stop my medication abruptly without first discussing it with my provider.    Staying Connected With Others  I will stay in touch with my friends, family members, and my primary care provider/team.    Use your imagination  Be creative.  We all have a creative side; it doesn t matter if it s oil painting, sand castles, or mud pies! This will also kick up the endorphins.    Witness Beauty  (AKA stop and smell the roses) Take a look outside, even in mid-winter. Notice colors, textures. Watch the squirrels and birds.     Service to others  Be of service to others.  There is always someone else in need.  By helping others we can  get out of ourselves  and remember the really important things.  This also provides opportunities for practicing all the other parts of the program.    Humor  Laugh and be silly!  Adjust your TV habits for less news and crime-drama and more comedy.    Control your stress  Try breathing deep, massage therapy, biofeedback, and meditation. Find time to relax each day.     My support system    Clinic Contact:  Phone number:    Contact 1:  Phone number:    Contact 2:  Phone number:    Holiness/:  Phone number:    Therapist:  Phone number:    Local crisis center:    Phone number:    Other community support:  Phone number:       2019

## 2019-09-19 DIAGNOSIS — E78.5 HYPERLIPIDEMIA LDL GOAL <130: ICD-10-CM

## 2019-09-19 RX ORDER — SIMVASTATIN 40 MG
TABLET ORAL
Qty: 30 TABLET | Refills: 0 | Status: SHIPPED | OUTPATIENT
Start: 2019-09-19 | End: 2019-10-19

## 2019-09-19 NOTE — TELEPHONE ENCOUNTER
Medication is being filled for 1 time refill only due to:  Patient needs to be seen because it has been more than one year since last visit.   Tessa Langford, ERIKAN, RN  [facility]

## 2019-09-19 NOTE — TELEPHONE ENCOUNTER
"Requested Prescriptions   Pending Prescriptions Disp Refills     simvastatin (ZOCOR) 40 MG tablet [Pharmacy Med Name: SIMVASTATIN 40MG TABLETS]  Last Written Prescription Date:  9/5/2018  Last Fill Quantity: 30 tab,  # refills: 11   Last Office Visit: 8/6/2018 Beryl  Future Office Visit:      30 tablet 0     Sig: TAKE 1 TABLET(40 MG) BY MOUTH AT BEDTIME       Statins Protocol Failed - 9/19/2019  5:10 AM        Failed - LDL on file in past 12 months     Recent Labs   Lab Test 08/06/18  1029   *             Failed - Recent (12 mo) or future (30 days) visit within the authorizing provider's specialty     Patient had office visit in the last 12 months or has a visit in the next 30 days with authorizing provider or within the authorizing provider's specialty.  See \"Patient Info\" tab in inbasket, or \"Choose Columns\" in Meds & Orders section of the refill encounter.              Passed - No abnormal creatine kinase in past 12 months     No lab results found.             Passed - Medication is active on med list        Passed - Patient is age 18 or older        Passed - No active pregnancy on record        Passed - No positive pregnancy test in past 12 months        "

## 2019-10-19 ENCOUNTER — TELEPHONE (OUTPATIENT)
Dept: FAMILY MEDICINE | Facility: CLINIC | Age: 54
End: 2019-10-19

## 2019-10-19 DIAGNOSIS — E78.5 HYPERLIPIDEMIA LDL GOAL <130: ICD-10-CM

## 2019-10-21 RX ORDER — SIMVASTATIN 40 MG
TABLET ORAL
Qty: 15 TABLET | Refills: 0 | Status: SHIPPED | OUTPATIENT
Start: 2019-10-21 | End: 2019-11-01

## 2019-10-21 NOTE — TELEPHONE ENCOUNTER
Temp script extended as I agree with below. No further will be granted until seen. Please notify.  Electronically Signed By: Albania Cordoba PA-C

## 2019-10-21 NOTE — TELEPHONE ENCOUNTER
Routing refill request to provider for review/approval because:  Sue given x1 and patient did not follow up, please advise  Patient needs to be seen because it has been more than 1 year since last office visit.    ERIKA WhittenN, RN  [facility]

## 2019-10-21 NOTE — TELEPHONE ENCOUNTER
"Requested Prescriptions   Pending Prescriptions Disp Refills     simvastatin (ZOCOR) 40 MG tablet [Pharmacy Med Name: SIMVASTATIN 40MG  Last Written Prescription Date:  9/19/19  Last Fill Quantity: 30,  # refills: 0   Last Office Visit: 8/6/2018   Future Office Visit:      TABLETS] 30 tablet 0     Sig: TAKE 1 TABLET(40 MG) BY MOUTH AT BEDTIME       Statins Protocol Failed - 10/19/2019  5:08 AM        Failed - LDL on file in past 12 months     Recent Labs   Lab Test 08/06/18  1029   *           Failed - Recent (12 mo) or future (30 days) visit within the authorizing provider's specialty     Patient has had an office visit with the authorizing provider or a provider within the authorizing providers department within the previous 12 mos or has a future within next 30 days. See \"Patient Info\" tab in inbasket, or \"Choose Columns\" in Meds & Orders section of the refill encounter.            Passed - No abnormal creatine kinase in past 12 months     No lab results found.           Passed - Medication is active on med list        Passed - Patient is age 18 or older        Passed - No active pregnancy on record        Passed - No positive pregnancy test in past 12 months          "

## 2019-10-22 NOTE — TELEPHONE ENCOUNTER
Called [phone] and left non detailed message for pt to call back.  See below.  Needs to be seen for medication follow up or physical.  Jesica Chu

## 2019-11-01 ENCOUNTER — OFFICE VISIT (OUTPATIENT)
Dept: FAMILY MEDICINE | Facility: CLINIC | Age: 54
End: 2019-11-01
Payer: COMMERCIAL

## 2019-11-01 VITALS
BODY MASS INDEX: 31.83 KG/M2 | TEMPERATURE: 98 F | OXYGEN SATURATION: 97 % | SYSTOLIC BLOOD PRESSURE: 122 MMHG | DIASTOLIC BLOOD PRESSURE: 80 MMHG | HEIGHT: 68 IN | HEART RATE: 86 BPM | WEIGHT: 210 LBS

## 2019-11-01 DIAGNOSIS — Z12.31 ENCOUNTER FOR SCREENING MAMMOGRAM FOR BREAST CANCER: ICD-10-CM

## 2019-11-01 DIAGNOSIS — L82.0 INFLAMED SEBORRHEIC KERATOSIS: ICD-10-CM

## 2019-11-01 DIAGNOSIS — F31.81 BIPOLAR II DISORDER (H): ICD-10-CM

## 2019-11-01 DIAGNOSIS — E78.5 HYPERLIPIDEMIA LDL GOAL <130: ICD-10-CM

## 2019-11-01 DIAGNOSIS — Z12.11 SCREENING FOR COLON CANCER: ICD-10-CM

## 2019-11-01 DIAGNOSIS — F17.200 TOBACCO USE DISORDER: ICD-10-CM

## 2019-11-01 DIAGNOSIS — Z00.00 ROUTINE GENERAL MEDICAL EXAMINATION AT A HEALTH CARE FACILITY: Primary | ICD-10-CM

## 2019-11-01 DIAGNOSIS — K21.9 GASTROESOPHAGEAL REFLUX DISEASE WITHOUT ESOPHAGITIS: ICD-10-CM

## 2019-11-01 DIAGNOSIS — Z13.1 SCREENING FOR DIABETES MELLITUS: ICD-10-CM

## 2019-11-01 LAB
ALBUMIN SERPL-MCNC: 4.3 G/DL (ref 3.4–5)
ALP SERPL-CCNC: 67 U/L (ref 40–150)
ALT SERPL W P-5'-P-CCNC: 23 U/L (ref 0–50)
ANION GAP SERPL CALCULATED.3IONS-SCNC: 6 MMOL/L (ref 3–14)
AST SERPL W P-5'-P-CCNC: 15 U/L (ref 0–45)
BILIRUB SERPL-MCNC: 0.8 MG/DL (ref 0.2–1.3)
BUN SERPL-MCNC: 6 MG/DL (ref 7–30)
CALCIUM SERPL-MCNC: 9 MG/DL (ref 8.5–10.1)
CHLORIDE SERPL-SCNC: 106 MMOL/L (ref 94–109)
CHOLEST SERPL-MCNC: 197 MG/DL
CO2 SERPL-SCNC: 25 MMOL/L (ref 20–32)
CREAT SERPL-MCNC: 0.79 MG/DL (ref 0.52–1.04)
GFR SERPL CREATININE-BSD FRML MDRD: 84 ML/MIN/{1.73_M2}
GLUCOSE SERPL-MCNC: 99 MG/DL (ref 70–99)
HDLC SERPL-MCNC: 48 MG/DL
LDLC SERPL CALC-MCNC: 128 MG/DL
NONHDLC SERPL-MCNC: 149 MG/DL
POTASSIUM SERPL-SCNC: 4.1 MMOL/L (ref 3.4–5.3)
PROT SERPL-MCNC: 7.7 G/DL (ref 6.8–8.8)
SODIUM SERPL-SCNC: 137 MMOL/L (ref 133–144)
TRIGL SERPL-MCNC: 104 MG/DL

## 2019-11-01 PROCEDURE — 80053 COMPREHEN METABOLIC PANEL: CPT | Performed by: FAMILY MEDICINE

## 2019-11-01 PROCEDURE — 17110 DESTRUCTION B9 LES UP TO 14: CPT | Performed by: FAMILY MEDICINE

## 2019-11-01 PROCEDURE — 90471 IMMUNIZATION ADMIN: CPT | Performed by: FAMILY MEDICINE

## 2019-11-01 PROCEDURE — 99213 OFFICE O/P EST LOW 20 MIN: CPT | Mod: 25 | Performed by: FAMILY MEDICINE

## 2019-11-01 PROCEDURE — 90732 PPSV23 VACC 2 YRS+ SUBQ/IM: CPT | Performed by: FAMILY MEDICINE

## 2019-11-01 PROCEDURE — 36415 COLL VENOUS BLD VENIPUNCTURE: CPT | Performed by: FAMILY MEDICINE

## 2019-11-01 PROCEDURE — 80061 LIPID PANEL: CPT | Performed by: FAMILY MEDICINE

## 2019-11-01 PROCEDURE — 99396 PREV VISIT EST AGE 40-64: CPT | Mod: 25 | Performed by: FAMILY MEDICINE

## 2019-11-01 RX ORDER — OMEPRAZOLE 40 MG/1
40 CAPSULE, DELAYED RELEASE ORAL DAILY
Qty: 180 CAPSULE | Refills: 0 | Status: SHIPPED | OUTPATIENT
Start: 2019-11-01 | End: 2020-04-13

## 2019-11-01 RX ORDER — SIMVASTATIN 40 MG
40 TABLET ORAL AT BEDTIME
Qty: 90 TABLET | Refills: 3 | Status: SHIPPED | OUTPATIENT
Start: 2019-11-01 | End: 2020-09-10

## 2019-11-01 SDOH — HEALTH STABILITY: MENTAL HEALTH: HOW OFTEN DO YOU HAVE A DRINK CONTAINING ALCOHOL?: 2-4 TIMES A MONTH

## 2019-11-01 SDOH — HEALTH STABILITY: MENTAL HEALTH: HOW MANY STANDARD DRINKS CONTAINING ALCOHOL DO YOU HAVE ON A TYPICAL DAY?: 5 OR 6

## 2019-11-01 ASSESSMENT — ANXIETY QUESTIONNAIRES
7. FEELING AFRAID AS IF SOMETHING AWFUL MIGHT HAPPEN: NOT AT ALL
5. BEING SO RESTLESS THAT IT IS HARD TO SIT STILL: NOT AT ALL
IF YOU CHECKED OFF ANY PROBLEMS ON THIS QUESTIONNAIRE, HOW DIFFICULT HAVE THESE PROBLEMS MADE IT FOR YOU TO DO YOUR WORK, TAKE CARE OF THINGS AT HOME, OR GET ALONG WITH OTHER PEOPLE: SOMEWHAT DIFFICULT
1. FEELING NERVOUS, ANXIOUS, OR ON EDGE: NOT AT ALL
GAD7 TOTAL SCORE: 0
6. BECOMING EASILY ANNOYED OR IRRITABLE: NOT AT ALL
3. WORRYING TOO MUCH ABOUT DIFFERENT THINGS: NOT AT ALL
2. NOT BEING ABLE TO STOP OR CONTROL WORRYING: NOT AT ALL

## 2019-11-01 ASSESSMENT — PATIENT HEALTH QUESTIONNAIRE - PHQ9
5. POOR APPETITE OR OVEREATING: NOT AT ALL
SUM OF ALL RESPONSES TO PHQ QUESTIONS 1-9: 4

## 2019-11-01 ASSESSMENT — MIFFLIN-ST. JEOR: SCORE: 1601.05
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
## 2019-11-01 NOTE — LETTER
November 4, 2019      Brea Aquino  [address] [address]        Dear ,    We are writing to inform you of your test results.      -LDL(bad) cholesterol level is elevated, HDL(good) cholesterol level is low which can increase your heart disease risk.  A diet high in fat and simple carbohydrates, genetics and being overweight can contribute to this. ADVISE: exercising 150 minutes of aerobic exercise per week (30 minutes for 5 days per week or 50 minutes for 3 days per week are options) and eating a low saturated fat/low carbohydrate diet are helpful to improve this. In 3 months, you should recheck your fasting cholesterol panel by scheduling a lab-only appointment. In 12 months, you should recheck your fasting cholesterol panel.   -Liver and gallbladder tests are normal (ALT,AST, Alk phos, bilirubin), kidney function is normal (Cr, GFR), sodium is normal, potassium is normal, calcium is normal, glucose is normal.     Resulted Orders   Lipid panel reflex to direct LDL Fasting   Result Value Ref Range    Cholesterol 197 <200 mg/dL    Triglycerides 104 <150 mg/dL      Comment:      Fasting specimen    HDL Cholesterol 48 (L) >49 mg/dL    LDL Cholesterol Calculated 128 (H) <100 mg/dL      Comment:      Above desirable:  100-129 mg/dl  Borderline High:  130-159 mg/dL  High:             160-189 mg/dL  Very high:       >189 mg/dl      Non HDL Cholesterol 149 (H) <130 mg/dL      Comment:      Above Desirable:  130-159 mg/dl  Borderline high:  160-189 mg/dl  High:             190-219 mg/dl  Very high:       >219 mg/dl     Comprehensive metabolic panel (BMP + Alb, Alk Phos, ALT, AST, Total. Bili, TP)   Result Value Ref Range    Sodium 137 133 - 144 mmol/L    Potassium 4.1 3.4 - 5.3 mmol/L    Chloride 106 94 - 109 mmol/L    Carbon Dioxide 25 20 - 32 mmol/L    Anion Gap 6 3 - 14 mmol/L    Glucose 99 70 - 99 mg/dL      Comment:      Fasting specimen    Urea Nitrogen 6 (L) 7 - 30 mg/dL    Creatinine  0.79 0.52 - 1.04 mg/dL    GFR Estimate 84 >60 mL/min/[1.73_m2]      Comment:      Non  GFR Calc  Starting 12/18/2018, serum creatinine based estimated GFR (eGFR) will be   calculated using the Chronic Kidney Disease Epidemiology Collaboration   (CKD-EPI) equation.      GFR Estimate If Black >90 >60 mL/min/[1.73_m2]      Comment:       GFR Calc  Starting 12/18/2018, serum creatinine based estimated GFR (eGFR) will be   calculated using the Chronic Kidney Disease Epidemiology Collaboration   (CKD-EPI) equation.      Calcium 9.0 8.5 - 10.1 mg/dL    Bilirubin Total 0.8 0.2 - 1.3 mg/dL    Albumin 4.3 3.4 - 5.0 g/dL    Protein Total 7.7 6.8 - 8.8 g/dL    Alkaline Phosphatase 67 40 - 150 U/L    ALT 23 0 - 50 U/L    AST 15 0 - 45 U/L       If you have any questions or concerns, please call the clinic at the number listed above.       Sincerely,        Abraham Adkins, DO

## 2019-11-02 ASSESSMENT — ANXIETY QUESTIONNAIRES: GAD7 TOTAL SCORE: 0

## 2019-11-13 ENCOUNTER — HOSPITAL ENCOUNTER (OUTPATIENT)
Facility: CLINIC | Age: 54
End: 2019-11-13
Attending: INTERNAL MEDICINE | Admitting: INTERNAL MEDICINE
Payer: COMMERCIAL

## 2019-12-10 ENCOUNTER — ANCILLARY PROCEDURE (OUTPATIENT)
Dept: MAMMOGRAPHY | Facility: CLINIC | Age: 54
End: 2019-12-10
Attending: FAMILY MEDICINE
Payer: COMMERCIAL

## 2019-12-10 DIAGNOSIS — Z12.31 ENCOUNTER FOR SCREENING MAMMOGRAM FOR BREAST CANCER: ICD-10-CM

## 2019-12-10 PROCEDURE — 77067 SCR MAMMO BI INCL CAD: CPT | Mod: TC

## 2020-01-16 ENCOUNTER — OFFICE VISIT (OUTPATIENT)
Dept: FAMILY MEDICINE | Facility: CLINIC | Age: 55
End: 2020-01-16
Payer: COMMERCIAL

## 2020-01-16 VITALS
TEMPERATURE: 98.2 F | DIASTOLIC BLOOD PRESSURE: 80 MMHG | BODY MASS INDEX: 32.13 KG/M2 | HEART RATE: 92 BPM | WEIGHT: 212 LBS | HEIGHT: 68 IN | OXYGEN SATURATION: 96 % | SYSTOLIC BLOOD PRESSURE: 136 MMHG

## 2020-01-16 DIAGNOSIS — J20.9 ACUTE BRONCHITIS, UNSPECIFIED ORGANISM: Primary | ICD-10-CM

## 2020-01-16 DIAGNOSIS — F31.81 BIPOLAR II DISORDER (H): ICD-10-CM

## 2020-01-16 DIAGNOSIS — Z72.0 TOBACCO ABUSE: ICD-10-CM

## 2020-01-16 DIAGNOSIS — R05.9 COUGH: ICD-10-CM

## 2020-01-16 LAB
FLUAV+FLUBV AG SPEC QL: NEGATIVE
FLUAV+FLUBV AG SPEC QL: NEGATIVE
SPECIMEN SOURCE: NORMAL

## 2020-01-16 PROCEDURE — 99213 OFFICE O/P EST LOW 20 MIN: CPT | Performed by: FAMILY MEDICINE

## 2020-01-16 PROCEDURE — 87804 INFLUENZA ASSAY W/OPTIC: CPT | Performed by: FAMILY MEDICINE

## 2020-01-16 RX ORDER — ALBUTEROL SULFATE 90 UG/1
2 AEROSOL, METERED RESPIRATORY (INHALATION) EVERY 4 HOURS PRN
Qty: 3 INHALER | Refills: 1 | Status: SHIPPED | OUTPATIENT
Start: 2020-01-16 | End: 2021-01-22

## 2020-01-16 RX ORDER — AZITHROMYCIN 250 MG/1
TABLET, FILM COATED ORAL
Qty: 6 TABLET | Refills: 0 | Status: SHIPPED | OUTPATIENT
Start: 2020-01-16 | End: 2020-11-20

## 2020-01-16 RX ORDER — NICOTINE 21 MG/24HR
1 PATCH, TRANSDERMAL 24 HOURS TRANSDERMAL EVERY 24 HOURS
Qty: 28 PATCH | Refills: 5 | Status: SHIPPED | OUTPATIENT
Start: 2020-01-16 | End: 2021-01-22

## 2020-01-16 ASSESSMENT — MIFFLIN-ST. JEOR: SCORE: 1610.13

## 2020-01-16 NOTE — PROGRESS NOTES
Subjective     Brea Auqino is a 54 year old female who presents to clinic today for the following health issues:    HPI   Acute Illness   Acute illness concerns: Cough  Onset: x 5 days    Fever: no    Chills/Sweats: YES    Headache (location?): no    Sinus Pressure:no    Conjunctivitis:  no    Ear Pain: no    Rhinorrhea: no    Congestion: no    Sore Throat: no     Cough: YES-non-productive    Wheeze: YES    Decreased Appetite: no    Nausea: no    Vomiting: no    Diarrhea:  no    Dysuria/Freq.: no    Fatigue/Achiness: no /Hurts in Ribs when coughing / Hx of Pleurisy     Sick/Strep Exposure: YES- Works at [employer]     Therapies Tried and outcome: Bronchial Asthma relief Mucinex - helps a lot but wears off quickly. Using cough which ar helpful but only temporarily.       Patient Active Problem List   Diagnosis     Tobacco use disorder     Endometriosis     Other congenital anomaly of uterus     MEDICAL HISTORY OF -     Mild recurrent major depression (H)     Hyperlipidemia LDL goal <130     Colon polyps     Tension-type headaches     Upper back pain     Cervicalgia     Anxiety     Cellulitis     Gastroesophageal reflux disease without esophagitis     Bipolar II disorder (H) - Followed by Psych CNP Demetrice Gonzalez at [facility]     Past Surgical History:   Procedure Laterality Date     C ANALGESIA,EPIDURAL,LABOR &        COLONOSCOPY  2014    Dr. Estrada [facility]     COLONOSCOPY       [place] [facility]     ESOPHAGOSCOPY, GASTROSCOPY, DUODENOSCOPY (EGD), COMBINED  2014    Procedure: COMBINED ESOPHAGOSCOPY, GASTROSCOPY, DUODENOSCOPY (EGD);  Colonoscopy, & EGD with polypectomy at sigmoid colon using hot snare;  Surgeon: Floyd Estrada MD;  Location: [facility]     HYSTERECTOMY, PAP STILL INDICATED  07    Laparoscopic Supracervical, benign path     LAPAROSCOPY PROCEDURE UNLISTED      For Endometriosis     SURGICAL HISTORY OF -   08    Incision of 2 infected cysts-axilla,  "and abdomen     SURGICAL HISTORY OF -   9/7/2010    Removal of Epidermoid Cyst in Rt. Axillary area     TUBAL LIGATION         Social History     Tobacco Use     Smoking status: Current Every Day Smoker     Packs/day: 1.50     Years: 25.00     Pack years: 37.50     Types: Cigarettes     Smokeless tobacco: Never Used   Substance Use Topics     Alcohol use: Yes     Alcohol/week: 0.0 standard drinks     Frequency: 2-4 times a month     Drinks per session: 5 or 6     Comment: 10 drinks per week      Family History   Problem Relation Age of Onset     Cancer Father         skin cancer     Substance Abuse Father      Diabetes Sister      Anxiety Disorder Sister      Hyperlipidemia Mother      Other Cancer Mother         Melanoma     Anesthesia Reaction Mother      C.A.D. Maternal Grandfather         MI at 88     Cancer - colorectal Maternal Grandfather      Breast Cancer Maternal Grandfather      Colon Cancer Maternal Grandfather      Substance Abuse Maternal Grandfather      Anxiety Disorder Daughter      Diabetes Sister      Anxiety Disorder Sister      Diabetes Maternal Grandmother      Asthma Maternal Grandmother      Breast Cancer Cousin      Breast Cancer Cousin      Other Cancer Paternal Grandmother      Anesthesia Reaction Sister            Reviewed and updated as needed this visit by Provider         Review of Systems   ROS COMP: Constitutional, HEENT, cardiovascular, pulmonary, gi and gu systems are negative, except as otherwise noted.      Objective    /80   Pulse 92   Temp 98.2  F (36.8  C) (Tympanic)   Ht 1.727 m (5' 8\")   Wt 96.2 kg (212 lb)   LMP 11/22/2006   SpO2 96%   Breastfeeding No   BMI 32.23 kg/m    Body mass index is 32.23 kg/m .  Physical Exam   GENERAL: healthy, alert and no distress  EYES: Eyes grossly normal to inspection, PERRL and conjunctivae and sclerae normal  HENT: ear canals and TM's normal, nose and mouth without ulcers or lesions  NECK: no adenopathy and no asymmetry, " masses, or scars  RESP: lungs faint scattered wheezing throughout.  CV: regular rate and rhythm, normal S1 S2, no S3 or S4, no murmur, click or rub, no peripheral edema and peripheral pulses strong  ABDOMEN: soft, nontender, no hepatosplenomegaly, no masses and bowel sounds normal  MS: no gross musculoskeletal defects noted, no edema  PSYCH: mentation appears normal, affect normal/bright    Diagnostic Test Results:  none         Assessment & Plan     1. Acute bronchitis, unspecified organism: start albuterol to help with bronchospasm. Given smoking history, will start azithromycin. Continue other symptomatic cares as helpful. Follow up in 1-2 weeks if not improving.  - albuterol (PROAIR HFA/PROVENTIL HFA/VENTOLIN HFA) 108 (90 Base) MCG/ACT inhaler; Inhale 2 puffs into the lungs every 4 hours as needed for shortness of breath / dyspnea or wheezing  Dispense: 3 Inhaler; Refill: 1  - azithromycin (ZITHROMAX) 250 MG tablet; Two tablets first day, then one tablet daily for four days.  Dispense: 6 tablet; Refill: 0    2. Cough  - Influenza A/B antigen  - albuterol (PROAIR HFA/PROVENTIL HFA/VENTOLIN HFA) 108 (90 Base) MCG/ACT inhaler; Inhale 2 puffs into the lungs every 4 hours as needed for shortness of breath / dyspnea or wheezing  Dispense: 3 Inhaler; Refill: 1  - azithromycin (ZITHROMAX) 250 MG tablet; Two tablets first day, then one tablet daily for four days.  Dispense: 6 tablet; Refill: 0    3. Bipolar II disorder (H): stable, follows with psychiatry    4. Tobacco abuse: encourage smoking cessation,  - nicotine (NICODERM CQ) 21 MG/24HR 24 hr patch; Place 1 patch onto the skin every 24 hours  Dispense: 28 patch; Refill: 5  - nicotine (NICORETTE) 2 MG gum; Place 1 each (2 mg) inside cheek as needed for smoking cessation Place 1 each inside cheek as needed for smoking cessation  Dispense: 100 tablet; Refill: 5       Return in about 2 weeks (around 1/30/2020).    Abraham Adkins,   [facility]

## 2020-01-21 RX ORDER — ONDANSETRON 2 MG/ML
4 INJECTION INTRAMUSCULAR; INTRAVENOUS
Status: CANCELLED | OUTPATIENT
Start: 2020-01-21

## 2020-01-21 RX ORDER — LIDOCAINE 40 MG/G
CREAM TOPICAL
Status: CANCELLED | OUTPATIENT
Start: 2020-01-21

## 2020-02-06 ENCOUNTER — HOSPITAL ENCOUNTER (OUTPATIENT)
Facility: CLINIC | Age: 55
End: 2020-02-06
Attending: INTERNAL MEDICINE | Admitting: INTERNAL MEDICINE
Payer: COMMERCIAL

## 2020-06-18 DIAGNOSIS — Z11.59 ENCOUNTER FOR SCREENING FOR OTHER VIRAL DISEASES: Primary | ICD-10-CM

## 2020-07-21 ENCOUNTER — TELEPHONE (OUTPATIENT)
Dept: FAMILY MEDICINE | Facility: CLINIC | Age: 55
End: 2020-07-21

## 2020-07-21 DIAGNOSIS — R11.0 NAUSEA: Primary | ICD-10-CM

## 2020-07-21 RX ORDER — ONDANSETRON 4 MG/1
4 TABLET, ORALLY DISINTEGRATING ORAL EVERY 6 HOURS PRN
Qty: 20 TABLET | Refills: 0 | Status: SHIPPED | OUTPATIENT
Start: 2020-07-21 | End: 2021-01-22

## 2020-07-21 NOTE — TELEPHONE ENCOUNTER
Medication Question or Refill  Who is calling: Brea Aquino  What medication are you calling about (include dose and sig)?: Zorfran  Controlled Substance Agreement on file:   Who prescribed the medication?: No one  Do you need a refill? No  When did you use the medication last? N/A  Patient offered an appointment? Yes. Declined.  Do you have any questions or concerns?  Yes: Pt is requested a few days worth Zorfran because the colonoscopy prep kits make her vomit, and a surgical RN recommended it. Her colonoscopy is this Friday, 07/24  Requested Pharmacy:    [id number] [address] AT [facility] [address] & [facility] to leave a detailed message?: Yes at Cell number on file:    Telephone Information:   Mobile [phone]

## 2020-07-21 NOTE — TELEPHONE ENCOUNTER
Please see message from patient below. Please advise if OK for Zofran rx. Thank you.  DOUGLAS Vicente, RN  [facility]

## 2020-07-21 NOTE — TELEPHONE ENCOUNTER
Left a detailed voicemail for patient advising that rx has been sent in for her.  ERIKA VicenteN, RN  [facility]

## 2020-07-22 DIAGNOSIS — Z11.59 ENCOUNTER FOR SCREENING FOR OTHER VIRAL DISEASES: ICD-10-CM

## 2020-07-22 PROCEDURE — U0003 INFECTIOUS AGENT DETECTION BY NUCLEIC ACID (DNA OR RNA); SEVERE ACUTE RESPIRATORY SYNDROME CORONAVIRUS 2 (SARS-COV-2) (CORONAVIRUS DISEASE [COVID-19]), AMPLIFIED PROBE TECHNIQUE, MAKING USE OF HIGH THROUGHPUT TECHNOLOGIES AS DESCRIBED BY CMS-2020-01-R: HCPCS | Performed by: SURGERY

## 2020-07-23 LAB
SARS-COV-2 RNA SPEC QL NAA+PROBE: NOT DETECTED
SPECIMEN SOURCE: NORMAL

## 2020-07-24 ENCOUNTER — HOSPITAL ENCOUNTER (OUTPATIENT)
Facility: CLINIC | Age: 55
Discharge: HOME | End: 2020-07-24
Attending: SURGERY | Admitting: SURGERY
Payer: COMMERCIAL

## 2020-07-24 VITALS
OXYGEN SATURATION: 96 % | RESPIRATION RATE: 18 BRPM | DIASTOLIC BLOOD PRESSURE: 85 MMHG | SYSTOLIC BLOOD PRESSURE: 137 MMHG | HEIGHT: 68 IN | WEIGHT: 212 LBS | BODY MASS INDEX: 32.13 KG/M2 | TEMPERATURE: 98.9 F | HEART RATE: 69 BPM

## 2020-07-24 LAB — COLONOSCOPY: NORMAL

## 2020-07-24 PROCEDURE — 88305 TISSUE EXAM BY PATHOLOGIST: CPT | Mod: 26 | Performed by: SURGERY

## 2020-07-24 PROCEDURE — 99153 MOD SED SAME PHYS/QHP EA: CPT | Performed by: SURGERY

## 2020-07-24 PROCEDURE — 88305 TISSUE EXAM BY PATHOLOGIST: CPT | Performed by: SURGERY

## 2020-07-24 PROCEDURE — 25000128 H RX IP 250 OP 636: Performed by: SURGERY

## 2020-07-24 PROCEDURE — 45385 COLONOSCOPY W/LESION REMOVAL: CPT | Mod: PT | Performed by: SURGERY

## 2020-07-24 PROCEDURE — 45385 COLONOSCOPY W/LESION REMOVAL: CPT | Performed by: SURGERY

## 2020-07-24 PROCEDURE — G0500 MOD SEDAT ENDO SERVICE >5YRS: HCPCS | Performed by: SURGERY

## 2020-07-24 PROCEDURE — 88305 TISSUE EXAM BY PATHOLOGIST: CPT | Mod: 26,59

## 2020-07-24 RX ORDER — ONDANSETRON 2 MG/ML
4 INJECTION INTRAMUSCULAR; INTRAVENOUS EVERY 6 HOURS PRN
Status: DISCONTINUED | OUTPATIENT
Start: 2020-07-24 | End: 2020-07-24 | Stop reason: HOSPADM

## 2020-07-24 RX ORDER — ONDANSETRON 4 MG/1
4 TABLET, ORALLY DISINTEGRATING ORAL EVERY 6 HOURS PRN
Status: DISCONTINUED | OUTPATIENT
Start: 2020-07-24 | End: 2020-07-24 | Stop reason: HOSPADM

## 2020-07-24 RX ORDER — NALOXONE HYDROCHLORIDE 0.4 MG/ML
.1-.4 INJECTION, SOLUTION INTRAMUSCULAR; INTRAVENOUS; SUBCUTANEOUS
Status: DISCONTINUED | OUTPATIENT
Start: 2020-07-24 | End: 2020-07-24 | Stop reason: HOSPADM

## 2020-07-24 RX ORDER — ONDANSETRON 2 MG/ML
4 INJECTION INTRAMUSCULAR; INTRAVENOUS
Status: DISCONTINUED | OUTPATIENT
Start: 2020-07-24 | End: 2020-07-24 | Stop reason: HOSPADM

## 2020-07-24 RX ORDER — LIDOCAINE 40 MG/G
CREAM TOPICAL
Status: DISCONTINUED | OUTPATIENT
Start: 2020-07-24 | End: 2020-07-24 | Stop reason: HOSPADM

## 2020-07-24 RX ORDER — METOCLOPRAMIDE 10 MG/1
10 TABLET ORAL EVERY 6 HOURS PRN
Status: DISCONTINUED | OUTPATIENT
Start: 2020-07-24 | End: 2020-07-24 | Stop reason: HOSPADM

## 2020-07-24 RX ORDER — FENTANYL CITRATE 50 UG/ML
INJECTION, SOLUTION INTRAMUSCULAR; INTRAVENOUS PRN
Status: DISCONTINUED | OUTPATIENT
Start: 2020-07-24 | End: 2020-07-24 | Stop reason: HOSPADM

## 2020-07-24 RX ORDER — FLUMAZENIL 0.1 MG/ML
0.2 INJECTION, SOLUTION INTRAVENOUS
Status: DISCONTINUED | OUTPATIENT
Start: 2020-07-24 | End: 2020-07-24 | Stop reason: HOSPADM

## 2020-07-24 RX ORDER — METOCLOPRAMIDE HYDROCHLORIDE 5 MG/ML
10 INJECTION INTRAMUSCULAR; INTRAVENOUS EVERY 6 HOURS PRN
Status: DISCONTINUED | OUTPATIENT
Start: 2020-07-24 | End: 2020-07-24 | Stop reason: HOSPADM

## 2020-07-24 RX ORDER — PROCHLORPERAZINE MALEATE 10 MG
10 TABLET ORAL EVERY 6 HOURS PRN
Status: DISCONTINUED | OUTPATIENT
Start: 2020-07-24 | End: 2020-07-24 | Stop reason: HOSPADM

## 2020-07-24 ASSESSMENT — MIFFLIN-ST. JEOR: SCORE: 1610.13

## 2020-07-24 NOTE — LETTER
July 3, 2020      Brea Aquino  [address] [facility] [address]         Please be aware that coverage of these services is subject to the terms and limitations of your health insurance plan.  Call member services at your health plan with any benefit or coverage questions.    Thank you for choosing [facility]. You are scheduled for the following service(s):    Date:  Friday July 24, 2020             Procedure:  COLONOSCOPY  Doctor:        Dr Mendes   Arrival Time:  1pm  *Check in at front entrance desk at [facility]*  Procedure Time:  130pm      Location:   [facility]        Endoscopy Department, First Floor (Enter through front doors of hospital) *        [address] [place], [place] [address] [phone] or [phone] ([facility]) to reschedule      MIRALAX -GATORADE  PREP  Colonoscopy is the most accurate test to detect colon polyps and colon cancer; and the only test where polyps can be removed. During this procedure, a doctor examines the lining of your large intestine and rectum through a flexible tube.   Transportation  You must arrange for a ride for the day of your procedure with a responsible adult. A taxi , Uber, etc, is not an option unless you are accompanied by a responsible adult. If you fail to arrange transportation with a responsible adult, your procedure will be cancelled and rescheduled.    Purchase the  following supplies at your local pharmacy:  - 2 (two) bisacodyl tablets: each tablet contains 5 mg.  (Dulcolax  laxative NOT Dulcolax  stool softener)   - 1 (one) 8.3 oz bottle of Polyethylene Glycol (PEG) 3350 Powder   (MiraLAX , Smooth LAX , ClearLAX  or equivalent)  - 64 oz Gatorade    Regular Gatorade, Gatorade G2 , Powerade , Powerade Zero  or Pedialyte  is acceptable. Red colored flavors are not allowed; all other colors (yellow, green, orange, purple and blue) are okay. It is also okay to buy two 2.12 oz  packets of powdered Gatorade that can be mixed with water to a total volume of 64 oz of liquid.  - 1 (one) 10 oz bottle of Magnesium Citrate (Red colored flavors are not allowed)  It is also okay for you to use a 0.5 oz package of powdered magnesium citrate (17 g) mixed with 10 oz of water.      PREPARATION FOR COLONOSCOPY    7 days before:    Discontinue fiber supplements and medications containing iron. This includes Metamucil  and Fibercon ; and multivitamins with iron.    3 days before:    Begin a low-fiber diet. A low-fiber diet helps making the cleanout more effective.     Examples of a low-fiber diet include (but are not limited to): white bread, white rice, pasta, crackers, fish, chicken, eggs, ground beef, creamy peanut butter, cooked/steamed/boiled vegetables, canned fruit, bananas, melons, milk, plain yogurt cheese, salad dressing and other condiments.     The following are not allowed on a low-fiber diet: seeds, nuts, popcorn, bran, whole wheat, corn, quinoa, raw fruits and vegetables, berries and dried fruit, beans and lentils.    For additional details on low-fiber diet, please refer to the table on the last page.    2 days before:    Continue the low-fiber diet.     Drink at least 8 glasses of water throughout the day.     Stop eating solid foods at 11:45 pm.    1 day before:    In the morning: begin a clear liquid diet (liquids you can see through).     Examples of a clear liquid diet include: water, clear broth or bouillon, Gatorade, Pedialyte or Powerade, carbonated and non-carbonated soft drinks (Sprite , 7-Up , ginger ale), strained fruit juices without pulp (apple, white grape, white cranberry), Jell-O  and popsicles.     The following are not allowed on a clear liquid diet: red liquids, alcoholic beverages, dairy products (milk, creamer, and yogurt), protein shakes, creamy broths, juice with pulp and chewing tobacco.    At noon: take 2 (two) bisacodyl tablets     At 4 (and no later than 6pm):  start drinking the Miralax-Gatorade preparation (8.3 oz of Miralax mixed with 64 oz of Gatorade in a large pitcher). Drink 1(one) 8 oz glass every 15 minutes thereafter, until the mixture is gone.    COLON CLEANSING TIPS: drink adequate amounts of fluids before and after your colon cleansing to prevent dehydration. Stay near a toilet because you will have diarrhea. Even if you are sitting on the toilet, continue to drink the cleansing solution every 15 minutes. If you feel nauseous or vomit, rinse your mouth with water, take a 15 to 30-minute-break and then continue drinking the solution. You will be uncomfortable until the stool has flushed from your colon (in about 2 to 4 hours). You may feel chilled.    Day of your procedure  You may take all of your morning medications including blood pressure medications, blood thinners (if you have not been instructed to stop these by our office), methadone, anti-seizure medications with sips of water 3 hours prior to your procedure or earlier. Do not take insulin or vitamins prior to your procedure. Continue the clear liquid diet.       4 hours prior: drink 10 oz of magnesium citrate. It may be easier to drink it with a straw.    STOP consuming all liquids after that.     Do not take anything by mouth during this time.     Allow extra time to travel to your procedure as you may need to stop and use a restroom along the way.    You are ready for the procedure, if you followed all instructions and your stool is no longer formed, but clear or yellow liquid. If you are unsure whether your colon is clean, please call our office at [phone] before you leave for your appointment.    Bring the following to your procedure:  - Insurance Card/Photo ID.   - List of current medications including over-the-counter medications and supplements.   - Your rescue inhaler if you currently use one to control asthma.    Canceling or rescheduling your appointment:   If you must cancel or  reschedule your appointment, please call [phone] as soon as possible.      COLONOSCOPY PRE-PROCEDURE CHECKLIST    If you have diabetes, ask your regular doctor for diet and medication restrictions.  If you take an anticoagulant or anti-platelet medication (such as Coumadin , Lovenox , Pradaxa , Xarelto , Eliquis , etc.), please call your primary doctor for advice on holding this medication.  If you take aspirin you may continue to do so.  If you are or may be pregnant, please discuss the risks and benefits of this procedure with your doctor.        What happens during a colonoscopy?    Plan to spend up to two hours, starting at registration time, at the endoscopy center the day of your procedure. The colonoscopy takes an average of 15 to 30 minutes. Recovery time is about 30 minutes.      Before the exam:    You will change into a gown.    Your medical history and medication list will be reviewed with you, unless that has been done over the phone prior to the procedure.     A nurse will insert an intravenous (IV) line into your hand or arm.    The doctor will meet with you and will give you a consent form to sign.  During the exam:     Medicine will be given through the IV line to help you relax.     Your heart rate and oxygen levels will be monitored. If your blood pressure is low, you may be given fluids through the IV line.     The doctor will insert a flexible hollow tube, called a colonoscope, into your rectum. The scope will be advanced slowly through the large intestine (colon).    You may have a feeling of fullness or pressure.     If an abnormal tissue or a polyp is found, the doctor may remove it through the endoscope for closer examination, or biopsy. Tissue removal is painless    After the exam:           Any tissue samples removed during the exam will be sent to a lab for evaluation. It may take 5-7 working days for you to be notified of the results.     A nurse will provide you with complete  discharge instructions before you leave the endoscopy center. Be sure to ask the nurse for specific instructions if you take blood thinners such as Aspirin, Coumadin or Plavix.     The doctor will prepare a full report for you and for the physician who referred you for the procedure.     Your doctor will talk with you about the initial results of your exam.      Medication given during the exam will prohibit you from driving for the rest of the day.     Following the exam, you may resume your normal diet. Your first meal should be light, no greasy foods. Avoid alcohol until the next day.     You may resume your regular activities the day after the procedure.         LOW-FIBER DIET    Foods RECOMMENDED Foods to AVOID   Breads, Cereal, Rice and Pasta:   White bread, rolls, biscuits, croissant and niko toast.   Waffles, [place] toast and pancakes.   White rice, noodles, pasta, macaroni and peeled cooked potatoes.   Plain crackers and saltines.   Cooked cereals: farina, cream of rice.   Cold cereals: Puffed Rice , Rice Krispies , Corn Flakes  and Special K    Breads, Cereal, Rice and Pasta:   Breads or rolls with nuts, seeds or fruit.   Whole wheat, pumpernickel, rye breads and cornbread.   Potatoes with skin, brown or wild rice, and kasha (buckwheat).     Vegetables:   Tender cooked and canned vegetables without seeds: carrots, asparagus tips, green or wax beans, pumpkin, spinach, lima beans. Vegetables:   Raw or steamed vegetables.   Vegetables with seeds.   Sauerkraut.   Winter squash, peas, broccoli, Brussel sprouts, cabbage, onions, cauliflower, baked beans, peas and corn.   Fruits:   Strained fruit juice.   Canned fruit, except pineapple.   Ripe bananas and melon. Fruits:   Prunes and prune juice.   Raw fruits.   Dried fruits: figs, dates and raisins.   Milk/Dairy:   Milk: plain or flavored.   Yogurt, custard and ice cream.   Cheese and cottage cheese Milk/Dairy:     Meat and other proteins:   ground, well-cooked  tender beef, lamb, ham, veal, pork, fish, poultry and organ meats.   Eggs.   Peanut butter without nuts. Meat and other proteins:   Tough, fibrous meats with gristle.   Dry beans, peas and lentils.   Peanut butter with nuts.   Tofu.   Fats, Snack, Sweets, Condiments and Beverages:   Margarine, butter, oils, mayonnaise, sour cream and salad dressing, plain gravy.   Sugar, hard candy, clear jelly, honey and syrup.   Spices, cooked herbs, bouillon, broth and soups made with allowed vegetable, ketchup and mustard.   Coffee, tea and carbonated drinks.   Plain cakes, cookies and pretzels.   Gelatin, plain puddings, custard, ice cream, sherbet and popsicles. Fats, Snack, Sweets, Condiments and Beverages:   Nuts, seeds and coconut.   Jam, marmalade and preserves.   Pickles, olives, relish and horseradish.   All desserts containing nuts, seeds, dried fruit and coconut; or made from whole grains or bran.   Candy made with nuts or seeds.   Popcorn.           DIRECTIONS TO THE ENDOSCOPY DEPARTMENT     From the north ([facility])  Take [address] South, exit on [address] [address]. Get into the left hand shanelle, turn left (east), go one-half mile to [address] and turn left. Go north to the first stoplight, take a right on [address] and follow it to the Emergency entrance.    From the south ([facility])  Take 35N to the 35E split and exit on [address] [address]. On [address] [address], turn left (west) to [address]. Turn right (north) on [address]. Go north to the first stoplight, take a right on [address] and follow it to the Emergency entrance.    From the east via 35E ([facility])  Take 35E south to [address] [address] exit. Turn right on [address] [address]. Go west to [address]. Turn right (north) on [address]. Go to the first stoplight, take a right and follow on [address] to the Emergency entrance.    From the east via [address] ([facility])  [address] to  [address]. Turn left (south) on [address] to [address]. Turn left (east) on [address] and follow it to the Emergency entrance.    From the west via Highway [address] ([place], [place])  Take [address] east to [address]. Turn right (south) on [address] to [address]. Turn left (east) on [address] and follow it to the Emergency entrance.

## 2020-07-24 NOTE — H&P
Pre-Endoscopy History and Physical     Brea Aquino MRN# [id number]   YOB: 1965 Age: 54 year old     Date of Procedure: 7/24/2020  Primary care provider: Abraham Adkins  Type of Endoscopy: colonoscopy  Reason for Procedure: surveillance  Type of Anesthesia Anticipated: Moderate Sedation    HPI:    Brea is a 54 year old female who will be undergoing the above procedure.      A history and physical has been performed. The patient's medications and allergies have been reviewed. The risks and benefits of the procedure and the sedation options and risks were discussed with the patient.  All questions were answered and informed consent was obtained.      She denies a personal or family history of anesthesia complications or bleeding disorders.     Allergies   Allergen Reactions     Adhesive Tape      Seasonal Allergies         Prior to Admission Medications   Prescriptions Last Dose Informant Patient Reported? Taking?   BUSPIRONE HCL PO   Yes Yes   Sig: Take 15 mg by mouth 2 times daily   LATUDA 60 MG TABS tablet   Yes Yes   Sig: TAKE ONE TABLET BY MOUTH bedtime   LORazepam (ATIVAN) 0.5 MG tablet   Yes No   Sig: TAKE ONE TO TWO TABLETS BY MOUTH ONCE DAILY AS NEEDED FOR PANIC.   ORDER FOR DME   No No   Sig: TENS unit including supplies   albuterol (PROAIR HFA/PROVENTIL HFA/VENTOLIN HFA) 108 (90 Base) MCG/ACT inhaler   No No   Sig: Inhale 2 puffs into the lungs every 4 hours as needed for shortness of breath / dyspnea or wheezing   azithromycin (ZITHROMAX) 250 MG tablet   No No   Sig: Two tablets first day, then one tablet daily for four days.   hydrOXYzine (ATARAX) 10 MG tablet   Yes Yes   Sig: Take 10 mg by mouth 3 times daily as needed for anxiety    lamoTRIgine (LAMICTAL) 150 MG tablet   Yes Yes   Sig: TK 1 T PO  QAM AND TK 1 QPM   nicotine (NICODERM CQ) 21 MG/24HR 24 hr patch   No Yes   Sig: Place 1 patch onto the skin every 24 hours   nicotine (NICORETTE) 2 MG gum   No Yes   Sig: Place 1 each (2  mg) inside cheek as needed for smoking cessation Place 1 each inside cheek as needed for smoking cessation   omeprazole (PRILOSEC) 40 MG DR capsule   No Yes   Sig: TAKE 1 CAPSULE BY MOUTH DAILY 30 TO 60 MINUTES BEFORE A MEAL   ondansetron (ZOFRAN-ODT) 4 MG ODT tab   No No   Sig: Take 1 tablet (4 mg) by mouth every 6 hours as needed for nausea   simvastatin (ZOCOR) 40 MG tablet   No Yes   Sig: Take 1 tablet (40 mg) by mouth At Bedtime      Facility-Administered Medications: None       Patient Active Problem List   Diagnosis     Tobacco use disorder     Endometriosis     Other congenital anomaly of uterus     MEDICAL HISTORY OF -     Mild recurrent major depression (H)     Hyperlipidemia LDL goal <130     Colon polyps     Tension-type headaches     Upper back pain     Cervicalgia     Anxiety     Cellulitis     Gastroesophageal reflux disease without esophagitis     Bipolar II disorder (H) - Followed by Psych CNP Demetrice Gonzalez at [facility]        Past Medical History:   Diagnosis Date     Endometriosis, site unspecified 10/4/2007     MEDICAL HISTORY OF -     2 Cervix     Other and unspecified hyperlipidemia 2006     RECURR DEPRESSION-FULL REM 10/25/2006     Tobacco use disorder 2006        Past Surgical History:   Procedure Laterality Date     C ANALGESIA,EPIDURAL,LABOR &        COLONOSCOPY  2014    Dr. Estrada [facility]     COLONOSCOPY       [place] [facility]     ESOPHAGOSCOPY, GASTROSCOPY, DUODENOSCOPY (EGD), COMBINED  2014    Procedure: COMBINED ESOPHAGOSCOPY, GASTROSCOPY, DUODENOSCOPY (EGD);  Colonoscopy, & EGD with polypectomy at sigmoid colon using hot snare;  Surgeon: Floyd Estrada MD;  Location: [facility]     HYSTERECTOMY, PAP STILL INDICATED  07    Laparoscopic Supracervical, benign path     LAPAROSCOPY PROCEDURE UNLISTED      For Endometriosis     SURGICAL HISTORY OF -   08    Incision of 2 infected cysts-axilla, and abdomen     SURGICAL HISTORY  "OF -   9/7/2010    Removal of Epidermoid Cyst in Rt. Axillary area     TUBAL LIGATION         Social History     Tobacco Use     Smoking status: Current Every Day Smoker     Packs/day: 1.00     Years: 25.00     Pack years: 25.00     Types: Cigarettes     Smokeless tobacco: Never Used   Substance Use Topics     Alcohol use: Yes     Alcohol/week: 0.0 standard drinks     Frequency: 2-4 times a month     Drinks per session: 5 or 6     Comment: occasionally        Family History   Problem Relation Age of Onset     Cancer Father         skin cancer     Substance Abuse Father      Diabetes Sister      Anxiety Disorder Sister      Hyperlipidemia Mother      Other Cancer Mother         Melanoma     Anesthesia Reaction Mother      C.A.D. Maternal Grandfather         MI at 88     Cancer - colorectal Maternal Grandfather      Breast Cancer Maternal Grandfather      Colon Cancer Maternal Grandfather      Substance Abuse Maternal Grandfather      Anxiety Disorder Daughter      Diabetes Sister      Anxiety Disorder Sister      Diabetes Maternal Grandmother      Asthma Maternal Grandmother      Breast Cancer Cousin      Breast Cancer Cousin      Other Cancer Paternal Grandmother      Anesthesia Reaction Sister        REVIEW OF SYSTEMS:     5 point ROS negative except as noted above in HPI, including Gen., Resp., CV, GI &  system review.      PHYSICAL EXAM:   LMP 11/22/2006  Estimated body mass index is 32.23 kg/m  as calculated from the following:    Height as of 1/16/20: 1.727 m (5' 8\").    Weight as of 1/16/20: 96.2 kg (212 lb).   GENERAL APPEARANCE: healthy, alert and no distress  MENTAL STATUS: alert  AIRWAY EXAM: deferred to stay masked for covid precautions  RESP: lungs clear to auscultation - no rales, rhonchi or wheezes  CV: regular rates and rhythm      DIAGNOSTICS:    Not indicated      IMPRESSION   ASA Class 2 - Mild systemic disease        PLAN:       Plan for colonoscopy. We discussed the risks, benefits and " alternatives and the patient wished to proceed.    The above has been forwarded to the consulting provider.      Signed Electronically by: Estrada Mendes MD  July 24, 2020

## 2020-07-24 NOTE — LETTER
"July 28, 2020      Milad Dimas  [address] [facility] [address]        Dear ,    We are writing to inform you of your test results.    Colon had one adenoma type polyp. Recommend next colonoscopy in 5 years    Resulted Orders   Surgical pathology exam   Result Value Ref Range    Copath Report       Patient Name: MILAD DIMAS  MR#: [id number]  Specimen #: [id number]  Collected: 7/24/2020  Received: 7/24/2020  Reported: 7/27/2020 16:09  Ordering Phy(s): REMINGTON PARR    For improved result formatting, select 'View Enhanced Report Format' under   Linked Documents section.    SPECIMEN(S):  A: Colon polyp, transverse  B: Sigmoid colon polyp    FINAL DIAGNOSIS:  A: Transverse colon, polyp, biopsy/polypectomy-  -Tubular adenoma; negative for high-grade dysplasia or malignancy.    B: Sigmoid colon, polyps (x2), biopsy/polypectomy-  -Consistent with hyperplastic polyps.    Electronically signed out by:    Shahrzad Jones M.D.    CLINICAL HISTORY:  Screening for colon cancer.    GROSS:  A:  The specimen is received in formalin labeled with the patient's name,   identifying information and  designated \"transverse colon polyp\".  It consists of a 1 cm tan polypoid   tissue fragment.  Submitted entirely  in one block.    B:  The specimen is received in formalin labeled with the patien t's name,   identifying information and  designated \"sigmoid colon polyp x2\".  It consists of two tan polypoid   tissue fragments, 0.3 cm and 0.4 cm.  Submitted entirely in one block. (Dictated by: MEE Weber 7/24/2020   03:40 PM)    MICROSCOPIC:  A and B.  Microscopic examination is performed.    The technical component of this testing was completed at the [facility], with the professional component performed   at the [facility], [address], [place]  [address]   ([phone])    CPT Codes:  A: " 18388-ZU5  B: 63524-BI8    COLLECTION SITE:  Client: [facility]  Location: [facility] ([facility])         If you have any questions or concerns, please call the clinic at the number listed above.       Sincerely,        Estrada Mendes MD

## 2020-07-27 LAB — COPATH REPORT: NORMAL

## 2020-08-25 ENCOUNTER — HOSPITAL ENCOUNTER (EMERGENCY)
Facility: CLINIC | Age: 55
Discharge: HOME | End: 2020-08-25
Attending: EMERGENCY MEDICINE | Admitting: EMERGENCY MEDICINE
Payer: COMMERCIAL

## 2020-08-25 VITALS
OXYGEN SATURATION: 98 % | RESPIRATION RATE: 20 BRPM | DIASTOLIC BLOOD PRESSURE: 102 MMHG | SYSTOLIC BLOOD PRESSURE: 161 MMHG | TEMPERATURE: 97.8 F | HEART RATE: 74 BPM

## 2020-08-25 DIAGNOSIS — M54.42 ACUTE LEFT-SIDED LOW BACK PAIN WITH LEFT-SIDED SCIATICA: ICD-10-CM

## 2020-08-25 PROCEDURE — 25000131 ZZH RX MED GY IP 250 OP 636 PS 637: Performed by: EMERGENCY MEDICINE

## 2020-08-25 PROCEDURE — 99283 EMERGENCY DEPT VISIT LOW MDM: CPT

## 2020-08-25 RX ORDER — PREDNISONE 20 MG/1
TABLET ORAL
Qty: 10 TABLET | Refills: 0 | Status: SHIPPED | OUTPATIENT
Start: 2020-08-25 | End: 2020-11-20

## 2020-08-25 RX ORDER — PREDNISONE 20 MG/1
60 TABLET ORAL ONCE
Status: COMPLETED | OUTPATIENT
Start: 2020-08-25 | End: 2020-08-25

## 2020-08-25 RX ORDER — LIDOCAINE 4 G/G
1 PATCH TOPICAL EVERY 24 HOURS
Qty: 20 PATCH | Refills: 0 | Status: SHIPPED | OUTPATIENT
Start: 2020-08-25 | End: 2021-01-22

## 2020-08-25 RX ORDER — NAPROXEN 500 MG/1
250 TABLET ORAL 2 TIMES DAILY PRN
Qty: 24 TABLET | Refills: 0 | Status: SHIPPED | OUTPATIENT
Start: 2020-08-25 | End: 2020-09-18

## 2020-08-25 RX ORDER — CYCLOBENZAPRINE HCL 10 MG
5-10 TABLET ORAL 3 TIMES DAILY PRN
Qty: 20 TABLET | Refills: 0 | Status: SHIPPED | OUTPATIENT
Start: 2020-08-25 | End: 2020-11-20

## 2020-08-25 RX ADMIN — PREDNISONE 60 MG: 20 TABLET ORAL at 04:40

## 2020-08-25 ASSESSMENT — ENCOUNTER SYMPTOMS
FEVER: 0
BACK PAIN: 1
ROS GI COMMENTS: NO BOWEL INCONTINENCE
ABDOMINAL PAIN: 0
NUMBNESS: 0
WEAKNESS: 0

## 2020-08-25 NOTE — ED TRIAGE NOTES
Pt states back pain for one week, worse for past 2-3 days. States pain worsens with certain movements. Denies trauma. ABCs intact GCS 15

## 2020-08-25 NOTE — ED AVS SNAPSHOT
[facility] Emergency Department  [address]  [facility] [address]  Phone:  [phone]  Fax:  [phone]                                    Brea Aquino   MRN: [id number]    Department:  [facility] Emergency Department   Date of Visit:  8/25/2020           After Visit Summary Signature Page    I have received my discharge instructions, and my questions have been answered. I have discussed any challenges I see with this plan with the nurse or doctor.    ..........................................................................................................................................  Patient/Patient Representative Signature      ..........................................................................................................................................  Patient Representative Print Name and Relationship to Patient    ..................................................               ................................................  Date                                   Time    ..........................................................................................................................................  Reviewed by Signature/Title    ...................................................              ..............................................  Date                                               Time          22EPIC Rev 08/18

## 2020-08-25 NOTE — ED PROVIDER NOTES
History     Chief Complaint:  Back Pain      HPI   Brea Aquino is a 54 year old female who presents with back pain. The patient reports that she has had left low back pain for that past week that radiated down to the left buttock. She also endorses left right sided back pain that have improved. The pain is exacerbated with sitting up, bending forward, and twisting. She states that she has used icy hot and ibuprofen every 4 hours for the pain, but has had no relief of symptoms. Shee denies any fever, history of cancer, IV drug usage, numbness, tingling, weakness of the legs, bowel or bladder incontinence, saddle anesthesia, abdominal pain, or trauma to the area.     Allergies:  Adhesive Tape  Seasonal Allergies     Medications:    Albuterol  Zithromax  Buspirone  Folic acid   Atarax  Lamictal  Latuda   Nicoderm  Nicorette  Prilosec  Zofran-odt  Zocor     Past Medical History:    Hyperlipidemia   Mild recurrent major depression   Gastroesophageal reflux disease without esophagitis   Bipolar II disorder   Cellulitis   Anxiety   Tension-type headaches   Upper back pain   Cervicalgia   Colon polyps   Endometriosis   Other congenital anomaly of uterus   Tobacco use disorder     Past Surgical History:    Analgesia epidural, labor, and   Colonoscopy x2  Esophagoscopy, gastroscopy, duodenoscopy, combined  Hysterectomy, pap still indicated   Laparoscopy procedure for endometriosis  Incision of 2 infected cysts-axilla and abdomen  Removal of epidermoid cyst of Rt axillary area  Tubal ligation    Family History:    Father: cancer, substance abuse  Sister: anxiety  Mother: melanoma, hyperlipidemia    Social History:  Smoking Status: Current everyday smoker   Packs/day: 1   Type: cigarettes  Smokeless Tobacco: Never Used  Alcohol Use: Positive (occasionally 2-4 times a month)  Drug Use: Negative  PCP: Abraham Adkins  Marital Status:          Review of Systems   Constitutional: Negative for fever.    Gastrointestinal: Negative for abdominal pain.        No bowel incontinence   Genitourinary:        No bladder incontinence    Musculoskeletal: Positive for back pain.   Neurological: Negative for weakness and numbness.        No tingling   All other systems reviewed and are negative.        Physical Exam     Patient Vitals for the past 24 hrs:   BP Temp Temp src Pulse Resp SpO2   08/25/20 0345 -- -- -- -- -- 98 %   08/25/20 0320 (!) 161/102 97.8  F (36.6  C) Oral 74 20 99 %       Physical Exam  Musculoskeletal:        Back:        General: the patient is awake and interactive  HEENT:  Moist mucous membranes, conjunctiva normal  Pulmonary:  Normal respiratory effort  Cardiovascular:  Well perfused  Abdomen:  Soft, nontender, nondistended.  No masses.  Musculoskeletal:  Moving 4 extremities grossly wnl, no deformities; no midline cervical/thoracic/lumbar midline tenderness.  Left paralumbar muscle tenderness. 5/5 BLE strength. SILT to BLE.  No CVA tenderness.  Neuro:  Speech normal, no focal deficits  Psych:  Normal affect    Emergency Department Course     Interventions:  0440 deltasone 60 mg PO    Emergency Department Course:  Nursing notes and vitals reviewed. I performed an exam of the patient as documented above.     Prior to discharge, I personally reviewed the results with the patient and all related questions were answered. The patient verbalized understanding and is amenable to plan.     Findings and plan explained to the Patient. Patient discharged home with instructions regarding supportive care, medications, and reasons to return. The importance of close follow-up was reviewed. The patient was prescribed flexeril, lidocaine, naproxen, and deltasone.  Impression & Plan      Medical Decision Making:  Brea Aquino presents for evaluation of low back pain. Findings on exam and by history are most consistent with mechanical low back strain and possible muscle spasms. With no direct trauma to the back and  no midline tenderness, I do not feel x-rays are indicated and no findings to suggest the need for emergency MRI. No evidence at this time to suggest any type of acute cord compromise or cauda equina syndrome. The patient has a normal neurologic exam of the lower extremities. History and exam at this time does no support alternate cause of low back pain including but not limited to spinal epidural abscess or hematoma, discitis, spine fracture, or any serious referred acute intra-abdominal or genitourinary process. The patient does not have a history of malignancy or IV drug use. The patient was counseled regarding the need for supportive care and close follow up with primary care. Indications to seek urgent re-evaluation were reviewed including but not limited to change to urinary or bowel patterns, saddle numbness, leg weakness, worsening or no improvement in pain or for any other questions or concerns. A prescriptions for flexeril, lidocaine, naproxen, and deltasone were provided. I will also refer her for physical therapy.  Early mobilization and range of motion exercises were also discussed. An understanding of the discharge instructions and need for follow up were verbally confirmed.      Diagnosis:    ICD-10-CM    1. Acute left-sided low back pain with left-sided sciatica  M54.42 [facility]       Disposition:   The patient is discharged to home.    Discharge Medications:  Discharge Medication List as of 8/25/2020  4:37 AM      START taking these medications    Details   cyclobenzaprine (FLEXERIL) 10 MG tablet Take 0.5-1 tablets (5-10 mg) by mouth 3 times daily as needed for muscle spasms, Disp-20 tablet,R-0, Local Print      Lidocaine (LIDOCARE) 4 % Patch Place 1 patch onto the skin every 24 hours To prevent lidocaine toxicity, patient should be patch free for 12 hrs daily.Disp-20 patch,R-0Local Print      naproxen (NAPROSYN) 500 MG tablet Take 0.5 tablets (250 mg) by mouth 2 times  daily as needed for moderate pain, Disp-24 tablet,R-0, Local Print      predniSONE (DELTASONE) 20 MG tablet Take two tablets (= 40mg) each day for 5 (five) days, Disp-10 tablet,R-0, Local Print             Scribe Disclosure:  MOHIT, Dania Snowden, am serving as a scribe at 3:24 AM on 8/25/2020 to document services personally performed by Evaristo Peters MD based on my observations and the provider's statements to me.       Evaristo Peters MD  08/25/20 [id number]

## 2020-09-10 DIAGNOSIS — E78.5 HYPERLIPIDEMIA LDL GOAL <130: ICD-10-CM

## 2020-09-10 RX ORDER — SIMVASTATIN 40 MG
TABLET ORAL
Qty: 90 TABLET | Refills: 0 | Status: SHIPPED | OUTPATIENT
Start: 2020-09-10 | End: 2020-12-15

## 2020-09-10 NOTE — TELEPHONE ENCOUNTER
Filled per FM protocol.     ERIKA SteelN, RN  [employer] [facility]     Health Maintenance Summary     Topic Due On Due Status Completed On Postpone Until Reason    Colorectal Cancer Screening - Colonoscopy Aug 1, 1995 Postponed  May 1, 2018 Patient Refused    Immunization - Pneumococcal  Completed Aug 12, 2013      Abdominal Aortic Aneurysm (AAA) Screening   Completed Jan 24, 2017      Medicare Wellness Visit Jan 18, 2019 Not Due Jan 18, 2018      IMMUNIZATION - DTaP/Tdap/Td Aug 21, 2023 Not Due Aug 21, 2013      Immunization-Influenza  Completed Oct 4, 2017      Depression Screening Jan 18, 2019 Not Due Jan 18, 2018      Hepatitis C Screening Aug 1, 1996 Postponed  Mar 27, 2018 Patient Refused          Patient is due for topics as listed above, he wishes to discuss with provider .

## 2020-09-16 DIAGNOSIS — K21.9 GASTROESOPHAGEAL REFLUX DISEASE WITHOUT ESOPHAGITIS: ICD-10-CM

## 2020-09-16 RX ORDER — OMEPRAZOLE 40 MG/1
40 CAPSULE, DELAYED RELEASE ORAL DAILY
Qty: 180 CAPSULE | Refills: 0 | Status: SHIPPED | OUTPATIENT
Start: 2020-09-16 | End: 2021-01-22

## 2020-11-20 ENCOUNTER — OFFICE VISIT (OUTPATIENT)
Dept: FAMILY MEDICINE | Facility: CLINIC | Age: 55
End: 2020-11-20
Payer: COMMERCIAL

## 2020-11-20 VITALS
DIASTOLIC BLOOD PRESSURE: 82 MMHG | OXYGEN SATURATION: 99 % | SYSTOLIC BLOOD PRESSURE: 122 MMHG | HEART RATE: 83 BPM | TEMPERATURE: 96.6 F | HEIGHT: 68 IN | WEIGHT: 214 LBS | BODY MASS INDEX: 32.43 KG/M2

## 2020-11-20 DIAGNOSIS — L03.211 CELLULITIS OF FACE: Primary | ICD-10-CM

## 2020-11-20 PROCEDURE — 99213 OFFICE O/P EST LOW 20 MIN: CPT | Performed by: NURSE PRACTITIONER

## 2020-11-20 RX ORDER — CEPHALEXIN 500 MG/1
500 CAPSULE ORAL 3 TIMES DAILY
Qty: 30 CAPSULE | Refills: 0 | Status: SHIPPED | OUTPATIENT
Start: 2020-11-20 | End: 2020-11-30

## 2020-11-20 ASSESSMENT — MIFFLIN-ST. JEOR: SCORE: 1614.2

## 2020-11-20 NOTE — PROGRESS NOTES
"Subjective   Brea Aquino is a 55 year old female who presents to clinic today for the following health issues:    HPI   Facial lump:   Pt states x 3 days ago she woke up with a lump on the right side of face near jaw.  Pt reports mild pain and has been taking ibuprofen for the pain.     No known injury or exacerbating factor, no ear pain. No tooth pain.    Reviewed and updated as needed this visit by provider:  Tobacco  Allergies  Meds  Problems  Med Hx  Surg Hx  Fam Hx          Review of Systems   Constitutional, HEENT, cardiovascular, pulmonary, GI, , musculoskeletal, neuro, skin, endocrine and psych systems are negative, except as otherwise noted per HPI.      Objective   /82   Pulse 83   Temp 96.6  F (35.9  C) (Tympanic)   Ht 1.727 m (5' 8\")   Wt 97.1 kg (214 lb)   LMP 11/22/2006   SpO2 99%   Breastfeeding No   BMI 32.54 kg/m   Body mass index is 32.54 kg/m .  Physical Exam   GENERAL: healthy, alert, well nourished, well hydrated, no distress  HENT: ear canals- normal; TMs- normal; Nose- normal; Mouth- no ulcers, no lesions  NECK: no tenderness,right anterior cervical lymphadenopathy, no asymmetry, no masses, no stiffness; thyroid- normal to palpation  RESP: lungs clear to auscultation - no rales, no rhonchi, no wheezes  CV: regular rates and rhythm, normal S1 S2, no S3 or S4 and no murmur, no click or rub -  ABDOMEN: soft, no tenderness, no  hepatosplenomegaly, no masses, normal bowel sounds  SKIN: right jaw, below ear, pre auricular swelling, soft, tender, warm to touch.           Assessment & Plan   Brea was seen today for facial swelling.    Diagnoses and all orders for this visit:    Cellulitis of face  -     cephALEXin (KEFLEX) 500 MG capsule; Take 1 capsule (500 mg) by mouth 3 times daily for 10 days           Tobacco Cessation:   reports that she has been smoking cigarettes. She has a 25.00 pack-year smoking history. She has never used smokeless tobacco.        BMI:   Estimated " "body mass index is 32.54 kg/m  as calculated from the following:    Height as of this encounter: 1.727 m (5' 8\").    Weight as of this encounter: 97.1 kg (214 lb).           See Patient Instructions    No follow-ups on file.            SADAF Ng     [address] [address]  [email or web address]   Office: [phone]           "

## 2020-12-15 DIAGNOSIS — E78.5 HYPERLIPIDEMIA LDL GOAL <130: ICD-10-CM

## 2020-12-16 RX ORDER — SIMVASTATIN 40 MG
40 TABLET ORAL AT BEDTIME
Qty: 30 TABLET | Refills: 0 | Status: SHIPPED | OUTPATIENT
Start: 2020-12-16 | End: 2021-01-22

## 2020-12-16 NOTE — TELEPHONE ENCOUNTER
"simvastatin (ZOCOR) 40 MG tablet 90 tablet 0 9/10/2020  No   Sig: TAKE 1 TABLET(40 MG) BY MOUTH AT BEDTIME   Sent to pharmacy as: Simvastatin 40 MG Oral Tablet (ZOCOR)       Last office visit:11/1/2019 with prescribing provider:     Future Office Visit:          Requested Prescriptions   Pending Prescriptions Disp Refills     simvastatin (ZOCOR) 40 MG tablet 90 tablet 0     Sig: Take 1 tablet (40 mg) by mouth At Bedtime       Statins Protocol Failed - 12/15/2020  9:02 AM        Failed - LDL on file in past 12 months     Recent Labs   Lab Test 11/01/19  1215   *             Passed - No abnormal creatine kinase in past 12 months     No lab results found.             Passed - Recent (12 mo) or future (30 days) visit within the authorizing provider's specialty     Patient has had an office visit with the authorizing provider or a provider within the authorizing providers department within the previous 12 mos or has a future within next 30 days. See \"Patient Info\" tab in inbasket, or \"Choose Columns\" in Meds & Orders section of the refill encounter.              Passed - Medication is active on med list        Passed - Patient is age 18 or older        Passed - No active pregnancy on record        Passed - No positive pregnancy test in past 12 months           Due for an Office visit for further refills, only fill for 30 days     Danica Ahn RN, BSN  [facility]     "

## 2021-01-09 ENCOUNTER — HEALTH MAINTENANCE LETTER (OUTPATIENT)
Age: 56
End: 2021-01-09

## 2021-01-21 ENCOUNTER — ANCILLARY PROCEDURE (OUTPATIENT)
Dept: MAMMOGRAPHY | Facility: CLINIC | Age: 56
End: 2021-01-21
Payer: COMMERCIAL

## 2021-01-21 DIAGNOSIS — Z12.31 VISIT FOR SCREENING MAMMOGRAM: ICD-10-CM

## 2021-01-21 PROCEDURE — 77063 BREAST TOMOSYNTHESIS BI: CPT | Mod: TC | Performed by: RADIOLOGY

## 2021-01-21 PROCEDURE — 77067 SCR MAMMO BI INCL CAD: CPT | Mod: TC | Performed by: RADIOLOGY

## 2021-01-22 ENCOUNTER — OFFICE VISIT (OUTPATIENT)
Dept: FAMILY MEDICINE | Facility: CLINIC | Age: 56
End: 2021-01-22
Payer: COMMERCIAL

## 2021-01-22 VITALS
HEIGHT: 68 IN | WEIGHT: 209 LBS | BODY MASS INDEX: 31.67 KG/M2 | SYSTOLIC BLOOD PRESSURE: 124 MMHG | DIASTOLIC BLOOD PRESSURE: 80 MMHG | TEMPERATURE: 97.8 F | HEART RATE: 82 BPM | OXYGEN SATURATION: 98 %

## 2021-01-22 DIAGNOSIS — Z13.1 SCREENING FOR DIABETES MELLITUS: ICD-10-CM

## 2021-01-22 DIAGNOSIS — Z12.4 SCREENING FOR MALIGNANT NEOPLASM OF CERVIX: ICD-10-CM

## 2021-01-22 DIAGNOSIS — K21.9 GASTROESOPHAGEAL REFLUX DISEASE WITHOUT ESOPHAGITIS: ICD-10-CM

## 2021-01-22 DIAGNOSIS — Z00.00 ROUTINE GENERAL MEDICAL EXAMINATION AT A HEALTH CARE FACILITY: Primary | ICD-10-CM

## 2021-01-22 DIAGNOSIS — E78.5 HYPERLIPIDEMIA LDL GOAL <130: ICD-10-CM

## 2021-01-22 DIAGNOSIS — Z72.0 TOBACCO ABUSE: ICD-10-CM

## 2021-01-22 DIAGNOSIS — F31.81 BIPOLAR II DISORDER (H): ICD-10-CM

## 2021-01-22 PROCEDURE — 80048 BASIC METABOLIC PNL TOTAL CA: CPT | Performed by: FAMILY MEDICINE

## 2021-01-22 PROCEDURE — G0145 SCR C/V CYTO,THINLAYER,RESCR: HCPCS | Performed by: FAMILY MEDICINE

## 2021-01-22 PROCEDURE — 36415 COLL VENOUS BLD VENIPUNCTURE: CPT | Performed by: FAMILY MEDICINE

## 2021-01-22 PROCEDURE — 80061 LIPID PANEL: CPT | Performed by: FAMILY MEDICINE

## 2021-01-22 PROCEDURE — 99396 PREV VISIT EST AGE 40-64: CPT | Performed by: FAMILY MEDICINE

## 2021-01-22 PROCEDURE — 87624 HPV HI-RISK TYP POOLED RSLT: CPT | Performed by: FAMILY MEDICINE

## 2021-01-22 RX ORDER — OMEPRAZOLE 40 MG/1
40 CAPSULE, DELAYED RELEASE ORAL DAILY
Qty: 180 CAPSULE | Refills: 1 | Status: SHIPPED | OUTPATIENT
Start: 2021-01-22 | End: 2022-03-11

## 2021-01-22 RX ORDER — NICOTINE 21 MG/24HR
1 PATCH, TRANSDERMAL 24 HOURS TRANSDERMAL EVERY 24 HOURS
Qty: 28 PATCH | Refills: 5 | Status: SHIPPED | OUTPATIENT
Start: 2021-01-22 | End: 2022-02-25

## 2021-01-22 RX ORDER — SIMVASTATIN 40 MG
40 TABLET ORAL AT BEDTIME
Qty: 90 TABLET | Refills: 3 | Status: SHIPPED | OUTPATIENT
Start: 2021-01-22 | End: 2021-12-20

## 2021-01-22 ASSESSMENT — MIFFLIN-ST. JEOR: SCORE: 1591.52
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
## 2021-01-23 LAB
ANION GAP SERPL CALCULATED.3IONS-SCNC: 4 MMOL/L (ref 3–14)
BUN SERPL-MCNC: 11 MG/DL (ref 7–30)
CALCIUM SERPL-MCNC: 9.7 MG/DL (ref 8.5–10.1)
CHLORIDE SERPL-SCNC: 108 MMOL/L (ref 94–109)
CHOLEST SERPL-MCNC: 194 MG/DL
CO2 SERPL-SCNC: 26 MMOL/L (ref 20–32)
CREAT SERPL-MCNC: 0.81 MG/DL (ref 0.52–1.04)
GFR SERPL CREATININE-BSD FRML MDRD: 82 ML/MIN/{1.73_M2}
GLUCOSE SERPL-MCNC: 95 MG/DL (ref 70–99)
HDLC SERPL-MCNC: 58 MG/DL
LDLC SERPL CALC-MCNC: 119 MG/DL
NONHDLC SERPL-MCNC: 136 MG/DL
POTASSIUM SERPL-SCNC: 4.3 MMOL/L (ref 3.4–5.3)
SODIUM SERPL-SCNC: 138 MMOL/L (ref 133–144)
TRIGL SERPL-MCNC: 84 MG/DL

## 2021-01-26 LAB
COPATH REPORT: NORMAL
PAP: NORMAL

## 2021-01-27 LAB
COPATH REPORT: NORMAL
COPATH REPORT: NORMAL
FINAL DIAGNOSIS: NORMAL
HPV HR 12 DNA CVX QL NAA+PROBE: NEGATIVE
HPV16 DNA SPEC QL NAA+PROBE: NEGATIVE
HPV18 DNA SPEC QL NAA+PROBE: NEGATIVE
PAP: NORMAL
PAP: NORMAL
SPECIMEN DESCRIPTION: NORMAL
SPECIMEN SOURCE CVX/VAG CYTO: NORMAL

## 2021-01-28 LAB
FINAL DIAGNOSIS: NORMAL
HPV HR 12 DNA CVX QL NAA+PROBE: NEGATIVE
HPV16 DNA SPEC QL NAA+PROBE: NEGATIVE
HPV18 DNA SPEC QL NAA+PROBE: NEGATIVE
SPECIMEN DESCRIPTION: NORMAL
SPECIMEN SOURCE CVX/VAG CYTO: NORMAL

## 2021-10-23 ENCOUNTER — HEALTH MAINTENANCE LETTER (OUTPATIENT)
Age: 56
End: 2021-10-23

## 2022-01-25 DIAGNOSIS — Z72.0 TOBACCO ABUSE: ICD-10-CM

## 2022-01-27 NOTE — TELEPHONE ENCOUNTER
Routing refill request to provider for review/approval because:    Partial Cholinergic Nicotinic Agonist Agents Failed 01/25/2022 06:00 PM   Protocol Details  Blood pressure under 140/90 in past 12 months     BP Readings from Last 6 Encounters:   01/22/21 124/80   11/20/20 122/82   08/25/20 (!) 161/102   07/24/20 137/85   01/16/20 136/80   11/01/19 122/80        Wes TORRES RN   [facility]

## 2022-03-11 ENCOUNTER — OFFICE VISIT (OUTPATIENT)
Dept: FAMILY MEDICINE | Facility: CLINIC | Age: 57
End: 2022-03-11
Payer: COMMERCIAL

## 2022-03-11 VITALS
OXYGEN SATURATION: 97 % | HEART RATE: 81 BPM | WEIGHT: 215 LBS | DIASTOLIC BLOOD PRESSURE: 78 MMHG | HEIGHT: 68 IN | SYSTOLIC BLOOD PRESSURE: 122 MMHG | BODY MASS INDEX: 32.58 KG/M2 | TEMPERATURE: 97.6 F

## 2022-03-11 DIAGNOSIS — K21.9 GASTROESOPHAGEAL REFLUX DISEASE WITHOUT ESOPHAGITIS: ICD-10-CM

## 2022-03-11 DIAGNOSIS — Z87.891 PERSONAL HISTORY OF TOBACCO USE: ICD-10-CM

## 2022-03-11 DIAGNOSIS — E78.5 HYPERLIPIDEMIA LDL GOAL <130: ICD-10-CM

## 2022-03-11 DIAGNOSIS — Z12.31 ENCOUNTER FOR SCREENING MAMMOGRAM FOR BREAST CANCER: ICD-10-CM

## 2022-03-11 DIAGNOSIS — F31.81 BIPOLAR II DISORDER (H): ICD-10-CM

## 2022-03-11 DIAGNOSIS — Z00.00 ROUTINE GENERAL MEDICAL EXAMINATION AT A HEALTH CARE FACILITY: Primary | ICD-10-CM

## 2022-03-11 DIAGNOSIS — Z80.8 FAMILY HISTORY OF MELANOMA: ICD-10-CM

## 2022-03-11 PROCEDURE — 36415 COLL VENOUS BLD VENIPUNCTURE: CPT | Performed by: FAMILY MEDICINE

## 2022-03-11 PROCEDURE — 80061 LIPID PANEL: CPT | Performed by: FAMILY MEDICINE

## 2022-03-11 PROCEDURE — 99396 PREV VISIT EST AGE 40-64: CPT | Performed by: FAMILY MEDICINE

## 2022-03-11 PROCEDURE — 80053 COMPREHEN METABOLIC PANEL: CPT | Performed by: FAMILY MEDICINE

## 2022-03-11 RX ORDER — OMEPRAZOLE 40 MG/1
40 CAPSULE, DELAYED RELEASE ORAL DAILY
Qty: 180 CAPSULE | Refills: 1 | Status: SHIPPED | OUTPATIENT
Start: 2022-03-11 | End: 2022-03-15

## 2022-03-11 RX ORDER — SIMVASTATIN 40 MG
40 TABLET ORAL AT BEDTIME
Qty: 90 TABLET | Refills: 3 | Status: SHIPPED | OUTPATIENT
Start: 2022-03-11 | End: 2022-03-15

## 2022-03-11 ASSESSMENT — ENCOUNTER SYMPTOMS
CHILLS: 0
JOINT SWELLING: 0
SORE THROAT: 0
NERVOUS/ANXIOUS: 0
HEMATURIA: 0
DIARRHEA: 0
SHORTNESS OF BREATH: 0
CONSTIPATION: 0
PALPITATIONS: 0
ARTHRALGIAS: 0
FEVER: 0
MYALGIAS: 0
BREAST MASS: 0
COUGH: 0
HEARTBURN: 0
FREQUENCY: 0
HEADACHES: 0
ABDOMINAL PAIN: 0
NAUSEA: 0
PARESTHESIAS: 0
WEAKNESS: 0
EYE PAIN: 0
DYSURIA: 0
DIZZINESS: 0
HEMATOCHEZIA: 0

## 2022-03-11 NOTE — PATIENT INSTRUCTIONS
Preventive Health Recommendations  Female Ages 50 - 64    Yearly exam: See your health care provider every year in order to  o Review health changes.   o Discuss preventive care.    o Review your medicines if your doctor has prescribed any.      Get a Pap test every three years (unless you have an abnormal result and your provider advises testing more often).    If you get Pap tests with HPV test, you only need to test every 5 years, unless you have an abnormal result.     You do not need a Pap test if your uterus was removed (hysterectomy) and you have not had cancer.    You should be tested each year for STDs (sexually transmitted diseases) if you're at risk.     Have a mammogram every 1 to 2 years.    Have a colonoscopy at age 50, or have a yearly FIT test (stool test). These exams screen for colon cancer.      Have a cholesterol test every 5 years, or more often if advised.    Have a diabetes test (fasting glucose) every three years. If you are at risk for diabetes, you should have this test more often.     If you are at risk for osteoporosis (brittle bone disease), think about having a bone density scan (DEXA).    Shots: Get a flu shot each year. Get a tetanus shot every 10 years.    Nutrition:     Eat at least 5 servings of fruits and vegetables each day.    Eat whole-grain bread, whole-wheat pasta and brown rice instead of white grains and rice.    Get adequate Calcium and Vitamin D.     Lifestyle    Exercise at least 150 minutes a week (30 minutes a day, 5 days a week). This will help you control your weight and prevent disease.    Limit alcohol to one drink per day.    No smoking.     Wear sunscreen to prevent skin cancer.     See your dentist every six months for an exam and cleaning.    See your eye doctor every 1 to 2 years.    Lung Cancer Screening   Frequently Asked Questions  If you are at high-risk for lung cancer, getting screened with low-dose computed tomography (LDCT) every year can help save  your life. This handout offers answers to some of the most common questions about lung cancer screening. If you have other questions, please call [phone] ([phone]).     What is it?  Lung cancer screening uses special X-ray technology to create an image of your lung tissue. The exam is quick and easy and takes less than 10 seconds. We don t give you any medicine or use any needles. You can eat before and after the exam. You don t need to change your clothes as long as the clothing on your chest doesn t contain metal. But, you do need to be able to hold your breath for at least 6 seconds during the exam.    What is the goal of lung cancer screening?  The goal of lung cancer screening is to save lives. Many times, lung cancer is not found until a person starts having physical symptoms. Lung cancer screening can help detect lung cancer in the earliest stages when it may be easier to treat.    Who should be screened for lung cancer?  We suggest lung cancer screening for anyone who is at high-risk for lung cancer. You are in the high-risk group if you:      are between the ages of 55 and 79, and    have smoked at least 1 pack of cigarettes a day for 20 or more years, and    still smoke or have quit within the past 15 years.    However, if you have a new cough or shortness of breath, you should talk to your doctor before being screened.    Why does it matter if I have symptoms?  Certain symptoms can be a sign that you have a condition in your lungs that should be checked and treated by your doctor. These symptoms include fever, chest pain, a new or changing cough, shortness of breath that you have never felt before, coughing up blood or unexplained weight loss. Having any of these symptoms can greatly affect the results of lung cancer screening.       Should all smokers get an LDCT lung cancer screening exam?  It depends. Lung cancer screening is for a very specific group of men and women who have a  history of heavy smoking over a long period of time (see  Who should be screened for lung cancer  above).  I am in the high-risk group, but have been diagnosed with cancer in the past. Is LDCT lung cancer screening right for me?  In some cases, you should not have LDCT lung screening, such as when your doctor is already following your cancer with CT scan studies. Your doctor will help you decide if LDCT lung screening is right for you.  Do I need to have a screening exam every year?  Yes. If you are in the high-risk group described earlier, you should get an LDCT lung cancer screening exam every year until you are 79, or are no longer willing or able to undergo screening and possible procedures to diagnose and treat lung cancer.  How effective is LDCT at preventing death from lung cancer?  Studies have shown that LDCT lung cancer screening can lower the risk of death from lung cancer by 20 percent in people who are at high-risk.  What are the risks?  There are some risks and limitations of LDCT lung cancer screening. We want to make sure you understand the risks and benefits, so please let us know if you have any questions. Your doctor may want to talk with you more about these risks.    Radiation exposure: As with any exam that uses radiation, there is a very small increased risk of cancer. The amount of radiation in LDCT is small--about the same amount a person would get from a mammogram. Your doctor orders the exam when he or she feels the potential benefits outweigh the risks.    False negatives: No test is perfect, including LDCT. It is possible that you may have a medical condition, including lung cancer, that is not found during your exam. This is called a false negative result.    False positives and more testing: LDCT very often finds something in the lung that could be cancer, but in fact is not. This is called a false positive result. False positive tests often cause anxiety. To make sure these findings  are not cancer, you may need to have more tests. These tests will be done only if you give us permission. Sometimes patients need a treatment that can have side effects, such as a biopsy. For more information on false positives, see  What can I expect from the results?     Findings not related to lung cancer: Your LDCT exam also takes pictures of areas of your body next to your lungs. In a very small number of cases, the CT scan will show an abnormal finding in one of these areas, such as your kidneys, adrenal glands, liver or thyroid. This finding may not be serious, but you may need more tests. Your doctor can help you decide what other tests you may need, if any.  What can I expect from the results?  About 1 out of 4 LDCT exams will find something that may need more tests. Most of the time, these findings are lung nodules. Lung nodules are very small collections of tissue in the lung. These nodules are very common, and the vast majority--more than 97 percent--are not cancer (benign). Most are normal lymph nodes or small areas of scarring from past infections.  But, if a small lung nodule is found to be cancer, the cancer can be cured more than 90 percent of the time. To know if the nodule is cancer, we may need to get more images before your next yearly screening exam. If the nodule has suspicious features (for example, it is large, has an odd shape or grows over time), we will refer you to a specialist for further testing.  Will my doctor also get the results?  Yes. Your doctor will get a copy of your results.  Is it okay to keep smoking now that there s a cancer screening exam?  No. Tobacco is one of the strongest cancer-causing agents. It causes not only lung cancer, but other cancers and cardiovascular (heart) diseases as well. The damage caused by smoking builds over time. This means that the longer you smoke, the higher your risk of disease. While it is never too late to quit, the sooner you quit, the  better.  Where can I find help to quit smoking?  The best way to prevent lung cancer is to stop smoking. If you have already quit smoking, congratulations and keep it up! For help on quitting smoking, please call [facility] at 7-446-QUITNOW (1-159.721.6637) or the American Cancer Society at 1-342.211.5476 to find local resources near you.  One-on-one health coaching:  If you d prefer to work individually with a health care provider on tobacco cessation, we offer:      Medication Therapy Management:  Our specially trained pharmacists work closely with you and your doctor to help you quit smoking.  Call [phone] or [phone] (toll free).

## 2022-03-11 NOTE — PROGRESS NOTES
SUBJECTIVE:   CC: Brea Aquion is an 56 year old woman who presents for preventive health visit.     Patient has been advised of split billing requirements and indicates understanding: Yes       Healthy Habits:     Getting at least 3 servings of Calcium per day:  NO    Bi-annual eye exam:  Yes    Dental care twice a year:  Yes    Sleep apnea or symptoms of sleep apnea:  None    Diet:  Regular (no restrictions)    Frequency of exercise:  None    Taking medications regularly:  Yes    Medication side effects:  None    PHQ-2 Total Score: 0    Additional concerns today:  No       Today's PHQ-2 Score:   PHQ-2 ( 1999 [employer]) 3/11/2022   Q1: Little interest or pleasure in doing things 0   Q2: Feeling down, depressed or hopeless 0   PHQ-2 Score 0   PHQ-2 Total Score (12-17 Years)- Positive if 3 or more points; Administer PHQ-A if positive -   Q1: Little interest or pleasure in doing things Not at all   Q2: Feeling down, depressed or hopeless Not at all   PHQ-2 Score 0       Abuse: Current or Past (Physical, Sexual or Emotional) - No  Do you feel safe in your environment? Yes    Have you ever done Advance Care Planning? (For example, a Health Directive, POLST, or a discussion with a medical provider or your loved ones about your wishes): No, advance care planning information given to patient to review.  Patient declined advance care planning discussion at this time.    Social History     Tobacco Use     Smoking status: Current Every Day Smoker     Packs/day: 0.75     Years: 42.00     Pack years: 31.50     Types: Cigarettes     Smokeless tobacco: Never Used   Substance Use Topics     Alcohol use: Yes     Alcohol/week: 0.0 standard drinks     Comment: occasionally      If you drink alcohol do you typically have >3 drinks per day or >7 drinks per week? No    Alcohol Use 3/11/2022   Prescreen: >3 drinks/day or >7 drinks/week? No   Prescreen: >3 drinks/day or >7 drinks/week? -       Reviewed orders with patient.  Reviewed  health maintenance and updated orders accordingly - Yes  Lab work is in process    Breast Cancer Screening:    FHS-7:   Breast CA Risk Assessment (FHS-7) 3/11/2022   Did any of your first-degree relatives have breast or ovarian cancer? No   Did any of your relatives have bilateral breast cancer? No   Did any man in your family have breast cancer? Yes   Did any woman in your family have breast and ovarian cancer? No   Did any woman in your family have breast cancer before age 50 y? No   Do you have 2 or more relatives with breast and/or ovarian cancer? No   Do you have 2 or more relatives with breast and/or bowel cancer? No       Mammogram Screening: Recommended annual mammography  Pertinent mammograms are reviewed under the imaging tab.    History of abnormal Pap smear: NO - age 30-65 PAP every 5 years with negative HPV co-testing recommended  PAP / HPV Latest Ref Rng & Units 2021   PAP (Historical) - NIL NIL NIL   HPV16 NEG:Negative - - -   HPV18 NEG:Negative - - -   HRHPV NEG:Negative - - -     Reviewed and updated as needed this visit by clinical staff   Tobacco  Allergies               Reviewed and updated as needed this visit by Provider                 Past Medical History:   Diagnosis Date     Depressive disorder     Bipolar 2     Endometriosis, site unspecified 10/4/2007     MEDICAL HISTORY OF -     2 Cervix     Other and unspecified hyperlipidemia 2006     RECURR DEPRESSION-FULL REM 10/25/2006     Tobacco use disorder 2006      Past Surgical History:   Procedure Laterality Date     C ANALGESIA,EPIDURAL,LABOR &        COLONOSCOPY  2014    Dr. Estrada [facility]     COLONOSCOPY       [place] [facility]     ESOPHAGOSCOPY, GASTROSCOPY, DUODENOSCOPY (EGD), COMBINED  2014    Procedure: COMBINED ESOPHAGOSCOPY, GASTROSCOPY, DUODENOSCOPY (EGD);  Colonoscopy, & EGD with polypectomy at sigmoid colon using hot snare;  Surgeon: Floyd Estrada MD;   "Location: [facility]     HYSTERECTOMY, PAP STILL INDICATED  12/5/07    Laparoscopic Supracervical, benign path     LAPAROSCOPY PROCEDURE UNLISTED      For Endometriosis     SURGICAL HISTORY OF -   9/19/08    Incision of 2 infected cysts-axilla, and abdomen     SURGICAL HISTORY OF -   9/7/2010    Removal of Epidermoid Cyst in Rt. Axillary area     TUBAL LIGATION         Review of Systems   Constitutional: Negative for chills and fever.   HENT: Negative for congestion, ear pain, hearing loss and sore throat.    Eyes: Negative for pain and visual disturbance.   Respiratory: Negative for cough and shortness of breath.    Cardiovascular: Negative for chest pain, palpitations and peripheral edema.   Gastrointestinal: Negative for abdominal pain, constipation, diarrhea, heartburn, hematochezia and nausea.   Breasts:  Negative for tenderness, breast mass and discharge.   Genitourinary: Negative for dysuria, frequency, genital sores, hematuria, pelvic pain, urgency, vaginal bleeding and vaginal discharge.   Musculoskeletal: Negative for arthralgias, joint swelling and myalgias.   Skin: Negative for rash.   Neurological: Negative for dizziness, weakness, headaches and paresthesias.   Psychiatric/Behavioral: Negative for mood changes. The patient is not nervous/anxious.          OBJECTIVE:   /78 (BP Location: Left arm, Cuff Size: Adult Large)   Pulse 81   Temp 97.6  F (36.4  C) (Tympanic)   Ht 1.727 m (5' 8\")   Wt 97.5 kg (215 lb)   LMP 11/22/2006   SpO2 97%   BMI 32.69 kg/m    Physical Exam  GENERAL: healthy, alert and no distress  EYES: Eyes grossly normal to inspection  HENT: ear canals and TM's normal, nose and mouth without ulcers or lesions  NECK: no adenopathy, no asymmetry, masses, or scars and thyroid normal to palpation  RESP: lungs clear to auscultation - no rales, rhonchi or wheezes  CV: regular rates and rhythm, normal S1 S2, no S3 or S4 and no murmur, click or rub  MS: no gross musculoskeletal defects " "noted, no edema  PSYCH: mentation appears normal, affect normal/bright    Diagnostic Test Results:  Labs reviewed in Epic    ASSESSMENT/PLAN:   1. Routine general medical examination at a health care facility  - Comprehensive metabolic panel (BMP + Alb, Alk Phos, ALT, AST, Total. Bili, TP); Future  - Lipid panel reflex to direct LDL Fasting; Future  - Comprehensive metabolic panel (BMP + Alb, Alk Phos, ALT, AST, Total. Bili, TP)  - Lipid panel reflex to direct LDL Fasting    2. Hyperlipidemia LDL goal <130: recheck lipids.    3. Bipolar II disorder (H): stable, follows with psychiatry    4. Gastroesophageal reflux disease without esophagitis: continue omeprazole    5. Personal history of tobacco use  - Prof fee: Shared Decisionmaking for Lung Cancer Screening  - CT Chest Lung Cancer Scrn Low Dose wo; Future    6. Family history of melanoma  - Adult Dermatology Referral; Future    7. Encounter for screening mammogram for breast cancer  - *MA Screening Digital Bilateral; Future      Patient has been advised of split billing requirements and indicates understanding: Yes    COUNSELING:  Reviewed preventive health counseling, as reflected in patient instructions       Regular exercise       Healthy diet/nutrition    Estimated body mass index is 32.69 kg/m  as calculated from the following:    Height as of this encounter: 1.727 m (5' 8\").    Weight as of this encounter: 97.5 kg (215 lb).    Weight management plan: Discussed healthy diet and exercise guidelines    She reports that she has been smoking cigarettes. She has a 31.50 pack-year smoking history. She has never used smokeless tobacco.      Counseling Resources:  ATP IV Guidelines  Pooled Cohorts Equation Calculator  Breast Cancer Risk Calculator  BRCA-Related Cancer Risk Assessment: FHS-7 Tool  FRAX Risk Assessment  ICSI Preventive Guidelines  Dietary Guidelines for Americans, 2010  USDA's MyPlate  ASA Prophylaxis  Lung CA Screening    Abraham Adkins, DO  [place] " [facility]

## 2022-03-11 NOTE — PROGRESS NOTES
Lung Cancer Screening Shared Decision Making Visit     Brea Aquino is eligible for lung cancer screening on the basis of the information provided in my signed lung cancer screening order.     I have discussed with patient the risks and benefits of screening for lung cancer with low-dose CT.     The risks include:  radiation exposure: one low dose chest CT has as much ionizing radiation as about 15 chest x-rays or 6 months of background radiation living in [place]    false positives: 96% of positive findings/nodules are NOT cancer, but some might still require additional diagnostic evaluation, including biopsy  over-diagnosis: some slow growing cancers that might never have been clinically significant will be detected and treated unnecessarily     The benefit of early detection of lung cancer is contingent upon adherence to annual screening or more frequent follow up if indicated.     Furthermore, reaping the benefits of screening requires Brea Aquino to be willing and physically able to undergo diagnostic procedures, if indicated. Although no specific guide is available for determining severity of comorbidities, it is reasonable to withhold screening in patients who have greater mortality risk from other diseases.     We did discuss that the only way to prevent lung cancer is to not smoke. Smoking cessation counseling was given, duration < 3 minutes.      I did not offer risk estimation using a calculator such as this one:    ShouldIScreen

## 2022-03-12 LAB
ALBUMIN SERPL-MCNC: 3.6 G/DL (ref 3.4–5)
ALP SERPL-CCNC: 64 U/L (ref 40–150)
ALT SERPL W P-5'-P-CCNC: 24 U/L (ref 0–50)
ANION GAP SERPL CALCULATED.3IONS-SCNC: 4 MMOL/L (ref 3–14)
AST SERPL W P-5'-P-CCNC: 12 U/L (ref 0–45)
BILIRUB SERPL-MCNC: 0.5 MG/DL (ref 0.2–1.3)
BUN SERPL-MCNC: 6 MG/DL (ref 7–30)
CALCIUM SERPL-MCNC: 9.7 MG/DL (ref 8.5–10.1)
CHLORIDE BLD-SCNC: 109 MMOL/L (ref 94–109)
CHOLEST SERPL-MCNC: 164 MG/DL
CO2 SERPL-SCNC: 28 MMOL/L (ref 20–32)
CREAT SERPL-MCNC: 0.86 MG/DL (ref 0.52–1.04)
FASTING STATUS PATIENT QL REPORTED: YES
GFR SERPL CREATININE-BSD FRML MDRD: 79 ML/MIN/1.73M2
GLUCOSE BLD-MCNC: 115 MG/DL (ref 70–99)
HDLC SERPL-MCNC: 65 MG/DL
LDLC SERPL CALC-MCNC: 90 MG/DL
NONHDLC SERPL-MCNC: 99 MG/DL
POTASSIUM BLD-SCNC: 5 MMOL/L (ref 3.4–5.3)
PROT SERPL-MCNC: 6.8 G/DL (ref 6.8–8.8)
SODIUM SERPL-SCNC: 141 MMOL/L (ref 133–144)
TRIGL SERPL-MCNC: 46 MG/DL

## 2022-03-25 ENCOUNTER — HOSPITAL ENCOUNTER (OUTPATIENT)
Dept: CT IMAGING | Facility: CLINIC | Age: 57
Discharge: HOME | End: 2022-03-25
Attending: FAMILY MEDICINE | Admitting: FAMILY MEDICINE
Payer: COMMERCIAL

## 2022-03-25 DIAGNOSIS — Z87.891 PERSONAL HISTORY OF TOBACCO USE: ICD-10-CM

## 2022-03-25 PROCEDURE — 71271 CT THORAX LUNG CANCER SCR C-: CPT

## 2022-04-12 ENCOUNTER — ANCILLARY PROCEDURE (OUTPATIENT)
Dept: MAMMOGRAPHY | Facility: CLINIC | Age: 57
End: 2022-04-12
Attending: FAMILY MEDICINE
Payer: COMMERCIAL

## 2022-04-12 DIAGNOSIS — Z12.31 ENCOUNTER FOR SCREENING MAMMOGRAM FOR BREAST CANCER: ICD-10-CM

## 2022-04-12 PROCEDURE — 77063 BREAST TOMOSYNTHESIS BI: CPT | Mod: TC | Performed by: RADIOLOGY

## 2022-04-12 PROCEDURE — 77067 SCR MAMMO BI INCL CAD: CPT | Mod: TC | Performed by: RADIOLOGY

## 2022-07-15 ENCOUNTER — OFFICE VISIT (OUTPATIENT)
Dept: FAMILY MEDICINE | Facility: CLINIC | Age: 57
End: 2022-07-15
Payer: COMMERCIAL

## 2022-07-15 DIAGNOSIS — L55.9 SUNBURN: ICD-10-CM

## 2022-07-15 DIAGNOSIS — L82.1 SEBORRHEIC KERATOSES: ICD-10-CM

## 2022-07-15 DIAGNOSIS — L81.4 SOLAR LENTIGO: ICD-10-CM

## 2022-07-15 DIAGNOSIS — D22.9 MULTIPLE BENIGN NEVI: ICD-10-CM

## 2022-07-15 DIAGNOSIS — D48.9 NEOPLASM OF UNCERTAIN BEHAVIOR: ICD-10-CM

## 2022-07-15 DIAGNOSIS — Z80.8 FAMILY HISTORY OF MELANOMA: ICD-10-CM

## 2022-07-15 DIAGNOSIS — L57.0 ACTINIC KERATOSIS: ICD-10-CM

## 2022-07-15 DIAGNOSIS — Z12.83 SCREENING EXAM FOR SKIN CANCER: Primary | ICD-10-CM

## 2022-07-15 PROCEDURE — 17003 DESTRUCT PREMALG LES 2-14: CPT | Mod: XS | Performed by: DERMATOLOGY

## 2022-07-15 PROCEDURE — 11102 TANGNTL BX SKIN SINGLE LES: CPT | Mod: GC | Performed by: DERMATOLOGY

## 2022-07-15 PROCEDURE — 88305 TISSUE EXAM BY PATHOLOGIST: CPT | Performed by: PATHOLOGY

## 2022-07-15 PROCEDURE — 99203 OFFICE O/P NEW LOW 30 MIN: CPT | Mod: 25 | Performed by: DERMATOLOGY

## 2022-07-15 PROCEDURE — 17000 DESTRUCT PREMALG LESION: CPT | Mod: XS | Performed by: DERMATOLOGY

## 2022-07-15 ASSESSMENT — PAIN SCALES - GENERAL: PAINLEVEL: NO PAIN (0)

## 2022-07-15 NOTE — LETTER
7/15/2022         RE: Brea Aquino  [address] [place] [address]        Dear Colleague,    Thank you for referring your patient, Brea Aquino, to the [facility]. Please see a copy of my visit note below.    [facility] Note - EP    Encounter Date: Jul 15, 2022    Dermatology Problem List:  FBSE 7/15/22  # AKs of the left side of the face - LN2  # Family history of melanoma  ____________________________________________    Assessment & Plan:   # Neoplasm of uncertain behavior, central upper chest suspicious for BCC  - Shave biopsy performed today, see note below    # Actinic keratosis  Discussed that these are pre-cancerous lesions and 10% of them will go on to become non-melanoma skin cancers over their next 10 years of life, which is why we opt treat them.   - Cryotherapy performed today, see procedure note below  - Encouraged daily sun protection with SPF 30+ and UPF clothing    # Sunburn, upper chest  - Encouraged keeping this area moisturized with Vaseline or Aquaphor to promote healing and to be sure to keep it protected with SPF 30+ at all times to prevent future sunburns    # Multiple clinically benign nevi  - Discussed benign etiology of these lesions and reassurance provided  - ABCDE's of melanoma reviewed with patient  - Encouraged daily sun protection with SPF 30+ and UPF clothing    # Family history of MM  - Continue photoprotection - recommend SPF 30 or higher with frequent reapplication  - Continue yearly skin exams  - Advised to monitor for changing, non-healing, bleeding, painful, changing, or otherwise symptomatic lesions    Procedures Performed:   - Cryotherapy procedure note, location(s): left lateral canthus and left cheek. After verbal consent and discussion of risks and benefits including, but not limited to, dyspigmentation/scar, blister, and pain, 3 lesion(s) was(were) treated with 1-2 mm freeze border for 1-2 cycles with liquid  nitrogen. Post cryotherapy instructions were provided.    Follow-up: 1 year.    Scribe Disclosure:  I, Sarah Renata, am serving as a scribe to document services personally performed by Roberto Tom MD based on data collection and the provider's statements to me.     Itzel Galvez, DO PGY-4  Department of Dermatology  [facility]    Staff Physician Comments:   I saw and evaluated the patient with the resident and I agree with the assessment and plan.  I was present for the {procedureandexam:272833} I have made edits if needed.    Roberto Tom MD  Staff Dermatologist and Dermatopathologist  , Department of Dermatology     ____________________________________________    CC: Derm Problem (Fbs )      HPI:  Ms. Brea Aquino is a(n) extremely pleasant 56 year old female who presents today as a new patient for a full body skin exam. The patient is here today for routine skin cancer screening exam due to her family history of melanoma in her mother.  She states her mother had 3 melanomas that she is aware of, one being on the right ear, unaware of the other locations.  Denies any other family members with melanoma.    Was recently outside and has a sunburn to the upper chest.  Reports history of tanning bed use when she was younger and occasional blistering sunburns.  Denies any history of immunosuppression.    The patient denies any painful, bleeding, or nonhealing lesions, or any new or changing moles.    Patient is otherwise feeling well, without additional skin concerns.     Labs Reviewed:  N/A    Physical Exam:  Vitals: LMP 11/22/2006   Breastfeeding No   SKIN: Total skin excluding the undergarment areas was performed. The exam included the head/face, neck, both arms, chest, back, abdomen, both legs, digits and/or nails.   - Central upper chest with a 5 mm shiny papule with central erosion  - Multiple waxy and velvety stuck on appearing plaques and papules on the  trunk and extremities   - Skin colored fleshy papule of the left preauricular cheek and right temporal scalp  - Gritty pink papule of the left lateral canthus and left zygomatic cheek and left medial cheek  - Scattered tan macules on sun exposed regions of the arms, face, chest and legs  - Red desquamating thin plaques of the central upper chest in a photo-distributed pattern  - No other lesions of concern on areas examined.     Medications:  Current Outpatient Medications   Medication     BUSPIRONE HCL PO     FOLIC ACID PO     hydrOXYzine (ATARAX) 10 MG tablet     lamoTRIgine (LAMICTAL) 150 MG tablet     LATUDA 60 MG TABS tablet     nicotine (NICODERM CQ) 21 MG/24HR 24 hr patch     nicotine (NICORETTE) 2 MG gum     omeprazole (PRILOSEC) 40 MG DR capsule     simvastatin (ZOCOR) 40 MG tablet     No current facility-administered medications for this visit.      Past Medical History:   Patient Active Problem List   Diagnosis     Tobacco use disorder     Endometriosis     Other congenital anomaly of uterus     MEDICAL HISTORY OF -     Mild recurrent major depression (H)     Hyperlipidemia LDL goal <130     Colon polyps     Tension-type headaches     Upper back pain     Cervicalgia     Anxiety     Cellulitis     Gastroesophageal reflux disease without esophagitis     Bipolar II disorder (H) - Followed by Psych CNP Demetrice Gonzalez at [facility]     Past Medical History:   Diagnosis Date     Depressive disorder     Bipolar 2     Endometriosis, site unspecified 10/4/2007     MEDICAL HISTORY OF -     2 Cervix     Other and unspecified hyperlipidemia 12/6/2006     RECURR DEPRESSION-FULL REM 10/25/2006     Tobacco use disorder 11/27/2006       CC Abraham Adkins, DO  [address] [place] [address] on close of this encounter.    This note has been created using voice recognition software; while it has been reviewed, some errors may persist.       Again, thank you for allowing me to participate in the care  of your patient.        Sincerely,        Roberto Tom MD

## 2022-07-15 NOTE — PROGRESS NOTES
[facility] Dermatology Clinic Note - EP    Encounter Date: Jul 15, 2022    Dermatology Problem List:  FBSE 7/15/22  # AKs of the left side of the face - LN2  # Family history of melanoma  ____________________________________________    Assessment & Plan:   # Neoplasm of uncertain behavior, central upper chest suspicious for BCC  - Shave biopsy performed today, see note below    # Actinic keratosis  Discussed that these are pre-cancerous lesions and 10% of them will go on to become non-melanoma skin cancers over their next 10 years of life, which is why we opt treat them.   - Cryotherapy performed today, see procedure note below  - Encouraged daily sun protection with SPF 30+ and UPF clothing    # Sunburn, upper chest  - Encouraged keeping this area moisturized with Vaseline or Aquaphor to promote healing and to be sure to keep it protected with SPF 30+ at all times to prevent future sunburns    # Multiple clinically benign nevi  - Discussed benign etiology of these lesions and reassurance provided  - ABCDE's of melanoma reviewed with patient  - Encouraged daily sun protection with SPF 30+ and UPF clothing    # Family history of MM  - Continue photoprotection - recommend SPF 30 or higher with frequent reapplication  - Continue yearly skin exams  - Advised to monitor for changing, non-healing, bleeding, painful, changing, or otherwise symptomatic lesions    Procedures Performed:   - Cryotherapy procedure note, location(s): left lateral canthus and left cheek. After verbal consent and discussion of risks and benefits including, but not limited to, dyspigmentation/scar, blister, and pain, 3 lesion(s) was(were) treated with 1-2 mm freeze border for 1-2 cycles with liquid nitrogen. Post cryotherapy instructions were provided.    Follow-up: 1 year.    Scribe Disclosure:  Sarah RUEDA, am serving as a scribe to document services personally performed by Roberto Tom MD based on data collection and the provider's  statements to me.     Itzel Galvez, DO PGY-4  Department of Dermatology  [facility]    Staff Physician Comments:   I saw and evaluated the patient with the resident and I agree with the assessment and plan.  I was present for the key portions of the above major procedure and examination. I have made edits if needed.    Roberto Tom MD  Staff Dermatologist and Dermatopathologist  , Department of Dermatology     ____________________________________________    CC: Derm Problem ([facility] )      HPI:  Ms. Brea Aquino is a(n) extremely pleasant 56 year old female who presents today as a new patient for a full body skin exam. The patient is here today for routine skin cancer screening exam due to her family history of melanoma in her mother.  She states her mother had 3 melanomas that she is aware of, one being on the right ear, unaware of the other locations.  Denies any other family members with melanoma.    Was recently outside and has a sunburn to the upper chest.  Reports history of tanning bed use when she was younger and occasional blistering sunburns.  Denies any history of immunosuppression.    The patient denies any painful, bleeding, or nonhealing lesions, or any new or changing moles.    Patient is otherwise feeling well, without additional skin concerns.     Labs Reviewed:  N/A    Physical Exam:  Vitals: LMP 11/22/2006   Breastfeeding No   SKIN: Total skin excluding the undergarment areas was performed. The exam included the head/face, neck, both arms, chest, back, abdomen, both legs, digits and/or nails.   - Central upper chest with a 5 mm shiny papule with central erosion  - Multiple waxy and velvety stuck on appearing plaques and papules on the trunk and extremities   - Skin colored fleshy papule of the left preauricular cheek and right temporal scalp  - Gritty pink papule of the left lateral canthus and left zygomatic cheek and left medial cheek  - Scattered tan  macules on sun exposed regions of the arms, face, chest and legs  - Red desquamating thin plaques of the central upper chest in a photo-distributed pattern  - No other lesions of concern on areas examined.     Medications:  Current Outpatient Medications   Medication     BUSPIRONE HCL PO     FOLIC ACID PO     hydrOXYzine (ATARAX) 10 MG tablet     lamoTRIgine (LAMICTAL) 150 MG tablet     LATUDA 60 MG TABS tablet     nicotine (NICODERM CQ) 21 MG/24HR 24 hr patch     nicotine (NICORETTE) 2 MG gum     omeprazole (PRILOSEC) 40 MG DR capsule     simvastatin (ZOCOR) 40 MG tablet     No current facility-administered medications for this visit.      Past Medical History:   Patient Active Problem List   Diagnosis     Tobacco use disorder     Endometriosis     Other congenital anomaly of uterus     MEDICAL HISTORY OF -     Mild recurrent major depression (H)     Hyperlipidemia LDL goal <130     Colon polyps     Tension-type headaches     Upper back pain     Cervicalgia     Anxiety     Cellulitis     Gastroesophageal reflux disease without esophagitis     Bipolar II disorder (H) - Followed by Psych CNP Demetrice Gonzalez at [facility]     Past Medical History:   Diagnosis Date     Depressive disorder     Bipolar 2     Endometriosis, site unspecified 10/4/2007     MEDICAL HISTORY OF -     2 Cervix     Other and unspecified hyperlipidemia 12/6/2006     RECURR DEPRESSION-FULL REM 10/25/2006     Tobacco use disorder 11/27/2006       CC Abraham Adkins,   [address] [place] [address] on close of this encounter.    This note has been created using voice recognition software; while it has been reviewed, some errors may persist.

## 2022-07-15 NOTE — PATIENT INSTRUCTIONS
Wound Care After a Biopsy    What is a skin biopsy?  A skin biopsy allows the doctor to examine a very small piece of tissue under the microscope to determine the diagnosis and the best treatment for the skin condition. A local anesthetic (numbing medicine)  is injected with a very small needle into the skin area to be tested. A small piece of skin is taken from the area. Sometimes a suture (stitch) is used.     What are the risks of a skin biopsy?  I will experience scar, bleeding, swelling, pain, crusting and redness. I may experience incomplete removal or recurrence. Risks of this procedure are excessive bleeding, bruising, infection, nerve damage, numbness, thick (hypertrophic or keloidal) scar and non-diagnostic biopsy.    How should I care for my wound for the first 24 hours?  Keep the wound dry and covered for 24 hours  If it bleeds, hold direct pressure on the area for 15 minutes. If bleeding does not stop then go to the emergency room  Avoid strenuous exercise the first 1-2 days or as your doctor instructs you    How should I care for the wound after 24 hours?  After 24 hours, remove the bandage  You may bathe or shower as normal  If you had a scalp biopsy, you can shampoo as usual and can use shower water to clean the biopsy site daily  Clean the wound twice a day with gentle soap and water  Do not scrub, be gentle  Apply white petroleum/Vaseline after cleaning the wound with a cotton swab or a clean finger, and keep the site covered with a Bandaid /bandage. Bandages are not necessary with a scalp biopsy  If you are unable to cover the site with a Bandaid /bandage, re-apply ointment 2-3 times a day to keep the site moist. Moisture will help with healing  Avoid strenuous activity for first 1-2 days  Avoid lakes, rivers, pools, and oceans until the stitches are removed or the site is healed    How do I clean my wound?  Wash hands thoroughly with soap or use hand  before all wound care  Clean the  wound with gentle soap and water  Apply white petroleum/Vaseline  to wound after it is clean  Replace the Bandaid /bandage to keep the wound covered for the first few days or as instructed by your doctor  If you had a scalp biopsy, warm shower water to the area on a daily basis should suffice    What should I use to clean my wound?   Cotton-tipped applicators (Qtips )  White petroleum jelly (Vaseline ). Use a clean new container and use Q-tips to apply.  Bandaids   as needed  Gentle soap     How should I care for my wound long term?  Do not get your wound dirty  Keep up with wound care for one week or until the area is healed.  A small scab will form and fall off by itself when the area is completely healed. The area will be red and will become pink in color as it heals. Sun protection is very important for how your scar will turn out. Sunscreen with an SPF 30 or greater is recommended once the area is healed.  You should have some soreness but it should be mild and slowly go away over several days. Talk to your doctor about using tylenol for pain,    When should I call my doctor?  If you have increased:   Pain or swelling  Pus or drainage (clear or slightly yellow drainage is ok)  Temperature over 100F  Spreading redness or warmth around wound    When will I hear about my results?  The biopsy results can take 2 weeks to come back.  Your results will automatically release to [email or web address] before your provider has even reviewed them.  The clinic will call you with the results, send you a [employer] message, or have you schedule a follow-up clinic or phone time to discuss the results.  Contact our clinics if you do not hear from us in 2 weeks.    Who should I call with questions?  [facility]: [phone]  [facility]: [phone]  For urgent needs outside of business hours call the [facility] at [phone] and ask for the dermatology resident on call      Cryotherapy    What is it?  Use of a very cold liquid, such as liquid nitrogen, to freeze and destroy abnormal skin cells that need to be removed    What should I expect?  Tenderness and redness  A small blister that might grow and fill with dark purple blood. There may be crusting.  More than one treatment may be needed if the lesions do not go away.    How do I care for the treated area?  Gently wash the area with your hands when bathing.  Use a thin layer of Vaseline to help with healing. You may use a Band-Aid.   The area should heal within 7-10 days and may leave behind a pink or lighter color.   Do not use an antibiotic or Neosporin ointment.   You may take acetaminophen (Tylenol) for pain.     Call your doctor if you have:  Severe pain  Signs of infection (warmth, redness, cloudy yellow drainage, and or a bad smell)  Questions or concerns    Patient Education     Checking for Skin Cancer  You can find cancer early by checking your skin each month. There are 3 kinds of skin cancer. They are melanoma, basal cell carcinoma, and squamous cell carcinoma. Doing monthly skin checks is the best way to find new marks or skin changes. Follow the instructions below for checking your skin.   The ABCDEs of checking moles for melanoma   Check your moles or growths for signs of melanoma using ABCDE:   Asymmetry: the sides of the mole or growth don t match  Border: the edges are ragged, notched, or blurred  Color: the color within the mole or growth varies  Diameter: the mole or growth is larger than 6 mm (size of a pencil eraser)  Evolving: the size, shape, or color of the mole or growth is changing (evolving is not shown in the images below)    Checking for other types of skin cancer  Basal cell carcinoma or squamous cell carcinoma have symptoms such as:     A spot or mole that looks different from all other marks on your skin  Changes in how an area feels, such as itching, tenderness, or pain  Changes in the skin's  surface, such as oozing, bleeding, or scaliness  A sore that does not heal  New swelling or redness beyond the border of a mole    Who s at risk?  Anyone can get skin cancer. But you are at greater risk if you have:   Fair skin, light-colored hair, or light-colored eyes  Many moles or abnormal moles on your skin  A history of sunburns from sunlight or tanning beds  A family history of skin cancer  A history of exposure to radiation or chemicals  A weakened immune system  If you have had skin cancer in the past, you are at risk for recurring skin cancer.   How to check your skin  Do your monthly skin checkups in front of a full-length mirror. Check all parts of your body, including your:   Head (ears, face, neck, and scalp)  Torso (front, back, and sides)  Arms (tops, undersides, upper, and lower armpits)  Hands (palms, backs, and fingers, including under the nails)  Buttocks and genitals  Legs (front, back, and sides)  Feet (tops, soles, toes, including under the nails, and between toes)  If you have a lot of moles, take digital photos of them each month. Make sure to take photos both up close and from a distance. These can help you see if any moles change over time.   Most skin changes are not cancer. But if you see any changes in your skin, call your doctor right away. Only he or she can diagnose a problem. If you have skin cancer, seeing your doctor can be the first step toward getting the treatment that could save your life.   [employer] last reviewed this educational content on 4/1/2019 2000-2020 [employer]. [address] [place] [address], [place] [address]. All rights reserved. This information is not intended as a substitute for professional medical care. Always follow your healthcare professional's instructions.       When should I call my doctor?  If you are worsening or not improving, please, contact us or seek urgent care as noted below.     Who should I call with questions (adults)?  [facility] (adult and pediatric): [phone]  [facility] (adult): [phone]  For urgent needs outside of business hours call the [facility] at [phone] and ask for the dermatology resident on call to be paged  If this is a medical emergency and you are unable to reach an ER, Call 911    Who should I call with questions (pediatric)?  [facility]- Pediatric Dermatology  Dr. Connie Minor, Dr. Tank Schuster, Dr. Henny Khan, MEE [place], Dr. Izabel Helm, Dr. Belkis Ritter & Dr. Francesco Cam  Non-urgent nurse triage line; [phone]- Awv and Cristina SMITH Care Coordinatorgary Vicente (/Complex ) [phone]    If you need a prescription refill, please contact your pharmacy. Refills are approved or denied by our Physicians during normal business hours, Monday through Fridays  Per office policy, refills will not be granted if you have not been seen within the past year (or sooner depending on your child's condition)    Scheduling Information:  Pediatric Appointment Scheduling and Call Center [phone]  Radiology Scheduling- [phone]  Sedation Unit Scheduling- [phone]  [facility] Scheduling- General [phone]; Pediatric Dermatology [phone]  Main  Services: [phone]  [place] citizen: [phone]  [place]: [phone]  [place]mong/[place] Islander/Croatian: [phone]  Preadmission Nursing Department Fax Number: [phone] (Fax all pre-operative paperwork to this number)    For urgent matters arising during evenings, weekends, or holidays that cannot wait for normal business hours please call [phone] and ask for the dermatology resident on call to be paged.

## 2022-07-20 LAB
PATH REPORT.COMMENTS IMP SPEC: NORMAL
PATH REPORT.FINAL DX SPEC: NORMAL
PATH REPORT.GROSS SPEC: NORMAL
PATH REPORT.MICROSCOPIC SPEC OTHER STN: NORMAL
PATH REPORT.RELEVANT HX SPEC: NORMAL

## 2022-10-09 ENCOUNTER — HEALTH MAINTENANCE LETTER (OUTPATIENT)
Age: 57
End: 2022-10-09

## 2022-12-01 DIAGNOSIS — Z72.0 TOBACCO ABUSE: ICD-10-CM

## 2022-12-06 RX ORDER — NICOTINE 21 MG/24HR
PATCH, TRANSDERMAL 24 HOURS TRANSDERMAL
Qty: 28 PATCH | Refills: 0 | Status: SHIPPED | OUTPATIENT
Start: 2022-12-06 | End: 2023-01-25

## 2022-12-06 NOTE — TELEPHONE ENCOUNTER
Called # [phone]     Called to see how much patient is smoking and if she could go down to lower dose patch. Patient states she is still smoking and trying to do the patch she feels she still needs the 21 mg patch. Refilled per protocol.     Zaina Mejia RN  [facility]

## 2023-03-14 DIAGNOSIS — E78.5 HYPERLIPIDEMIA LDL GOAL <130: ICD-10-CM

## 2023-03-16 RX ORDER — SIMVASTATIN 40 MG
TABLET ORAL
Qty: 90 TABLET | Refills: 0 | Status: SHIPPED | OUTPATIENT
Start: 2023-03-16 | End: 2023-05-01

## 2023-03-16 NOTE — TELEPHONE ENCOUNTER
Prescription approved per [facility] Refill Protocol.  Physical next week  Claire Cabrera RN

## 2023-04-24 ASSESSMENT — ENCOUNTER SYMPTOMS
NAUSEA: 0
WEAKNESS: 0
HEMATOCHEZIA: 0
MYALGIAS: 1
ABDOMINAL PAIN: 0
JOINT SWELLING: 0
SHORTNESS OF BREATH: 0
DIARRHEA: 0
FREQUENCY: 0
HEARTBURN: 0
EYE PAIN: 0
HEMATURIA: 0
FEVER: 0
PARESTHESIAS: 0
CHILLS: 0
DYSURIA: 0
HEADACHES: 0
NERVOUS/ANXIOUS: 0
BREAST MASS: 0
CONSTIPATION: 0
SORE THROAT: 0
DIZZINESS: 0
COUGH: 0
PALPITATIONS: 0
ARTHRALGIAS: 0

## 2023-05-01 ENCOUNTER — OFFICE VISIT (OUTPATIENT)
Dept: FAMILY MEDICINE | Facility: CLINIC | Age: 58
End: 2023-05-01
Payer: COMMERCIAL

## 2023-05-01 VITALS
HEIGHT: 68 IN | RESPIRATION RATE: 20 BRPM | TEMPERATURE: 98.1 F | HEART RATE: 70 BPM | WEIGHT: 214.6 LBS | SYSTOLIC BLOOD PRESSURE: 125 MMHG | BODY MASS INDEX: 32.52 KG/M2 | OXYGEN SATURATION: 96 % | DIASTOLIC BLOOD PRESSURE: 84 MMHG

## 2023-05-01 DIAGNOSIS — Z87.891 PERSONAL HISTORY OF TOBACCO USE: ICD-10-CM

## 2023-05-01 DIAGNOSIS — Z01.84 IMMUNITY STATUS TESTING: ICD-10-CM

## 2023-05-01 DIAGNOSIS — M54.50 PAIN IN RIGHT LUMBAR REGION OF BACK: ICD-10-CM

## 2023-05-01 DIAGNOSIS — Z72.0 TOBACCO ABUSE: ICD-10-CM

## 2023-05-01 DIAGNOSIS — Z12.31 ENCOUNTER FOR SCREENING MAMMOGRAM FOR BREAST CANCER: ICD-10-CM

## 2023-05-01 DIAGNOSIS — M79.671 RIGHT FOOT PAIN: ICD-10-CM

## 2023-05-01 DIAGNOSIS — F31.81 BIPOLAR II DISORDER (H): ICD-10-CM

## 2023-05-01 DIAGNOSIS — Z13.1 SCREENING FOR DIABETES MELLITUS: ICD-10-CM

## 2023-05-01 DIAGNOSIS — Z23 HIGH PRIORITY FOR 2019-NCOV VACCINE: ICD-10-CM

## 2023-05-01 DIAGNOSIS — E78.5 HYPERLIPIDEMIA LDL GOAL <130: ICD-10-CM

## 2023-05-01 DIAGNOSIS — Z00.00 ROUTINE GENERAL MEDICAL EXAMINATION AT A HEALTH CARE FACILITY: Primary | ICD-10-CM

## 2023-05-01 LAB
ALBUMIN SERPL BCG-MCNC: 4.5 G/DL (ref 3.5–5.2)
ALP SERPL-CCNC: 60 U/L (ref 35–104)
ALT SERPL W P-5'-P-CCNC: 16 U/L (ref 10–35)
ANION GAP SERPL CALCULATED.3IONS-SCNC: 10 MMOL/L (ref 7–15)
AST SERPL W P-5'-P-CCNC: 25 U/L (ref 10–35)
BILIRUB SERPL-MCNC: 0.7 MG/DL
BUN SERPL-MCNC: 11.3 MG/DL (ref 6–20)
CALCIUM SERPL-MCNC: 10.1 MG/DL (ref 8.6–10)
CHLORIDE SERPL-SCNC: 103 MMOL/L (ref 98–107)
CHOLEST SERPL-MCNC: 177 MG/DL
CREAT SERPL-MCNC: 0.77 MG/DL (ref 0.51–0.95)
DEPRECATED HCO3 PLAS-SCNC: 27 MMOL/L (ref 22–29)
GFR SERPL CREATININE-BSD FRML MDRD: 89 ML/MIN/1.73M2
GLUCOSE SERPL-MCNC: 98 MG/DL (ref 70–99)
HDLC SERPL-MCNC: 64 MG/DL
LDLC SERPL CALC-MCNC: 99 MG/DL
NONHDLC SERPL-MCNC: 113 MG/DL
POTASSIUM SERPL-SCNC: 4.5 MMOL/L (ref 3.4–5.3)
PROT SERPL-MCNC: 6.9 G/DL (ref 6.4–8.3)
SODIUM SERPL-SCNC: 140 MMOL/L (ref 136–145)
TRIGL SERPL-MCNC: 72 MG/DL

## 2023-05-01 PROCEDURE — 99213 OFFICE O/P EST LOW 20 MIN: CPT | Mod: 25 | Performed by: FAMILY MEDICINE

## 2023-05-01 PROCEDURE — 96127 BRIEF EMOTIONAL/BEHAV ASSMT: CPT | Performed by: FAMILY MEDICINE

## 2023-05-01 PROCEDURE — 86706 HEP B SURFACE ANTIBODY: CPT | Performed by: FAMILY MEDICINE

## 2023-05-01 PROCEDURE — 90677 PCV20 VACCINE IM: CPT | Performed by: FAMILY MEDICINE

## 2023-05-01 PROCEDURE — 99396 PREV VISIT EST AGE 40-64: CPT | Mod: 25 | Performed by: FAMILY MEDICINE

## 2023-05-01 PROCEDURE — 80061 LIPID PANEL: CPT | Performed by: FAMILY MEDICINE

## 2023-05-01 PROCEDURE — 90471 IMMUNIZATION ADMIN: CPT | Performed by: FAMILY MEDICINE

## 2023-05-01 PROCEDURE — 91313 COVID-19 BIVALENT 18+ (MODERNA): CPT | Performed by: FAMILY MEDICINE

## 2023-05-01 PROCEDURE — G0296 VISIT TO DETERM LDCT ELIG: HCPCS | Performed by: FAMILY MEDICINE

## 2023-05-01 PROCEDURE — 0134A COVID-19 BIVALENT 18+ (MODERNA): CPT | Performed by: FAMILY MEDICINE

## 2023-05-01 PROCEDURE — 80053 COMPREHEN METABOLIC PANEL: CPT | Performed by: FAMILY MEDICINE

## 2023-05-01 PROCEDURE — 36415 COLL VENOUS BLD VENIPUNCTURE: CPT | Performed by: FAMILY MEDICINE

## 2023-05-01 RX ORDER — LURASIDONE HYDROCHLORIDE 40 MG/1
40 TABLET, FILM COATED ORAL EVERY MORNING
COMMUNITY
Start: 2023-03-31 | End: 2024-02-09

## 2023-05-01 RX ORDER — NICOTINE 21 MG/24HR
PATCH, TRANSDERMAL 24 HOURS TRANSDERMAL
Qty: 28 PATCH | Refills: 0 | Status: SHIPPED | OUTPATIENT
Start: 2023-05-01 | End: 2023-06-27

## 2023-05-01 RX ORDER — SIMVASTATIN 40 MG
40 TABLET ORAL AT BEDTIME
Qty: 90 TABLET | Refills: 3 | Status: SHIPPED | OUTPATIENT
Start: 2023-05-01 | End: 2024-05-24

## 2023-05-01 ASSESSMENT — ANXIETY QUESTIONNAIRES
1. FEELING NERVOUS, ANXIOUS, OR ON EDGE: NOT AT ALL
7. FEELING AFRAID AS IF SOMETHING AWFUL MIGHT HAPPEN: NOT AT ALL
4. TROUBLE RELAXING: NOT AT ALL
2. NOT BEING ABLE TO STOP OR CONTROL WORRYING: NOT AT ALL
GAD7 TOTAL SCORE: 0
IF YOU CHECKED OFF ANY PROBLEMS ON THIS QUESTIONNAIRE, HOW DIFFICULT HAVE THESE PROBLEMS MADE IT FOR YOU TO DO YOUR WORK, TAKE CARE OF THINGS AT HOME, OR GET ALONG WITH OTHER PEOPLE: NOT DIFFICULT AT ALL
5. BEING SO RESTLESS THAT IT IS HARD TO SIT STILL: NOT AT ALL
8. IF YOU CHECKED OFF ANY PROBLEMS, HOW DIFFICULT HAVE THESE MADE IT FOR YOU TO DO YOUR WORK, TAKE CARE OF THINGS AT HOME, OR GET ALONG WITH OTHER PEOPLE?: NOT DIFFICULT AT ALL
GAD7 TOTAL SCORE: 0
3. WORRYING TOO MUCH ABOUT DIFFERENT THINGS: NOT AT ALL
7. FEELING AFRAID AS IF SOMETHING AWFUL MIGHT HAPPEN: NOT AT ALL
GAD7 TOTAL SCORE: 0
6. BECOMING EASILY ANNOYED OR IRRITABLE: NOT AT ALL

## 2023-05-01 ASSESSMENT — ENCOUNTER SYMPTOMS
CHILLS: 0
HEMATURIA: 0
PALPITATIONS: 0
DIARRHEA: 0
PARESTHESIAS: 0
MYALGIAS: 1
FREQUENCY: 0
WEAKNESS: 0
HEMATOCHEZIA: 0
HEARTBURN: 0
JOINT SWELLING: 0
ABDOMINAL PAIN: 0
EYE PAIN: 0
NERVOUS/ANXIOUS: 0
HEADACHES: 0
BREAST MASS: 0
DIZZINESS: 0
ARTHRALGIAS: 0
SORE THROAT: 0
SHORTNESS OF BREATH: 0
COUGH: 0
FEVER: 0
NAUSEA: 0
DYSURIA: 0
CONSTIPATION: 0

## 2023-05-01 ASSESSMENT — PATIENT HEALTH QUESTIONNAIRE - PHQ9
10. IF YOU CHECKED OFF ANY PROBLEMS, HOW DIFFICULT HAVE THESE PROBLEMS MADE IT FOR YOU TO DO YOUR WORK, TAKE CARE OF THINGS AT HOME, OR GET ALONG WITH OTHER PEOPLE: NOT DIFFICULT AT ALL
SUM OF ALL RESPONSES TO PHQ QUESTIONS 1-9: 0
SUM OF ALL RESPONSES TO PHQ QUESTIONS 1-9: 0

## 2023-05-01 NOTE — NURSING NOTE
Prior to immunization administration, verified patients identity using patient s name and date of birth. Please see Immunization Activity for additional information.     Screening Questionnaire for Adult Immunization    Are you sick today?   No   Do you have allergies to medications, food, a vaccine component or latex?   No   Have you ever had a serious reaction after receiving a vaccination?   No   Do you have a long-term health problem with heart, lung, kidney, or metabolic disease (e.g., diabetes), asthma, a blood disorder, no spleen, complement component deficiency, a cochlear implant, or a spinal fluid leak?  Are you on long-term aspirin therapy?   No   Do you have cancer, leukemia, HIV/AIDS, or any other immune system problem?   No   Do you have a parent, brother, or sister with an immune system problem?   No   In the past 3 months, have you taken medications that affect  your immune system, such as prednisone, other steroids, or anticancer drugs; drugs for the treatment of rheumatoid arthritis, Crohn s disease, or psoriasis; or have you had radiation treatments?   No   Have you had a seizure, or a brain or other nervous system problem?   No   During the past year, have you received a transfusion of blood or blood    products, or been given immune (gamma) globulin or antiviral drug?   No   For women: Are you pregnant or is there a chance you could become       pregnant during the next month?   No   Have you received any vaccinations in the past 4 weeks?   No     Immunization questionnaire answers were all negative.      Injection of Prevnar 20, moderna bivalent given by Angelina Abarca CMA. Patient instructed to remain in clinic for 15 minutes afterwards, and to report any adverse reactions.     Screening performed by Angelina Abarca CMA on 5/1/2023 at 11:48 AM.

## 2023-05-01 NOTE — PATIENT INSTRUCTIONS
Preventive Health Recommendations  Female Ages 50 - 64    Yearly exam: See your health care provider every year in order to  o Review health changes.   o Discuss preventive care.    o Review your medicines if your doctor has prescribed any.      Get a Pap test every three years (unless you have an abnormal result and your provider advises testing more often).    If you get Pap tests with HPV test, you only need to test every 5 years, unless you have an abnormal result.     You do not need a Pap test if your uterus was removed (hysterectomy) and you have not had cancer.    You should be tested each year for STDs (sexually transmitted diseases) if you're at risk.     Have a mammogram every 1 to 2 years.    Have a colonoscopy at age 50, or have a yearly FIT test (stool test). These exams screen for colon cancer.      Have a cholesterol test every 5 years, or more often if advised.    Have a diabetes test (fasting glucose) every three years. If you are at risk for diabetes, you should have this test more often.     If you are at risk for osteoporosis (brittle bone disease), think about having a bone density scan (DEXA).    Shots: Get a flu shot each year. Get a tetanus shot every 10 years.    Nutrition:     Eat at least 5 servings of fruits and vegetables each day.    Eat whole-grain bread, whole-wheat pasta and brown rice instead of white grains and rice.    Get adequate Calcium and Vitamin D.     Lifestyle    Exercise at least 150 minutes a week (30 minutes a day, 5 days a week). This will help you control your weight and prevent disease.    Limit alcohol to one drink per day.    No smoking.     Wear sunscreen to prevent skin cancer.     See your dentist every six months for an exam and cleaning.    See your eye doctor every 1 to 2 years.    Lung Cancer Screening   Frequently Asked Questions  If you are at high-risk for lung cancer, getting screened with low-dose computed tomography (LDCT) every year can help save  your life. This handout offers answers to some of the most common questions about lung cancer screening. If you have other questions, please call [phone] ([phone]).     What is it?  Lung cancer screening uses special X-ray technology to create an image of your lung tissue. The exam is quick and easy and takes less than 10 seconds. We don t give you any medicine or use any needles. You can eat before and after the exam. You don t need to change your clothes as long as the clothing on your chest doesn t contain metal. But, you do need to be able to hold your breath for at least 6 seconds during the exam.    What is the goal of lung cancer screening?  The goal of lung cancer screening is to save lives. Many times, lung cancer is not found until a person starts having physical symptoms. Lung cancer screening can help detect lung cancer in the earliest stages when it may be easier to treat.    Who should be screened for lung cancer?  We suggest lung cancer screening for anyone who is at high-risk for lung cancer. You are in the high-risk group if you:      are between the ages of 55 and 79, and    have smoked at least 1 pack of cigarettes a day for 20 or more years, and    still smoke or have quit within the past 15 years.    However, if you have a new cough or shortness of breath, you should talk to your doctor before being screened.    Why does it matter if I have symptoms?  Certain symptoms can be a sign that you have a condition in your lungs that should be checked and treated by your doctor. These symptoms include fever, chest pain, a new or changing cough, shortness of breath that you have never felt before, coughing up blood or unexplained weight loss. Having any of these symptoms can greatly affect the results of lung cancer screening.       Should all smokers get an LDCT lung cancer screening exam?  It depends. Lung cancer screening is for a very specific group of men and women who have a  history of heavy smoking over a long period of time (see  Who should be screened for lung cancer  above).  I am in the high-risk group, but have been diagnosed with cancer in the past. Is LDCT lung cancer screening right for me?  In some cases, you should not have LDCT lung screening, such as when your doctor is already following your cancer with CT scan studies. Your doctor will help you decide if LDCT lung screening is right for you.  Do I need to have a screening exam every year?  Yes. If you are in the high-risk group described earlier, you should get an LDCT lung cancer screening exam every year until you are 79, or are no longer willing or able to undergo screening and possible procedures to diagnose and treat lung cancer.  How effective is LDCT at preventing death from lung cancer?  Studies have shown that LDCT lung cancer screening can lower the risk of death from lung cancer by 20 percent in people who are at high-risk.  What are the risks?  There are some risks and limitations of LDCT lung cancer screening. We want to make sure you understand the risks and benefits, so please let us know if you have any questions. Your doctor may want to talk with you more about these risks.    Radiation exposure: As with any exam that uses radiation, there is a very small increased risk of cancer. The amount of radiation in LDCT is small--about the same amount a person would get from a mammogram. Your doctor orders the exam when he or she feels the potential benefits outweigh the risks.    False negatives: No test is perfect, including LDCT. It is possible that you may have a medical condition, including lung cancer, that is not found during your exam. This is called a false negative result.    False positives and more testing: LDCT very often finds something in the lung that could be cancer, but in fact is not. This is called a false positive result. False positive tests often cause anxiety. To make sure these findings  are not cancer, you may need to have more tests. These tests will be done only if you give us permission. Sometimes patients need a treatment that can have side effects, such as a biopsy. For more information on false positives, see  What can I expect from the results?     Findings not related to lung cancer: Your LDCT exam also takes pictures of areas of your body next to your lungs. In a very small number of cases, the CT scan will show an abnormal finding in one of these areas, such as your kidneys, adrenal glands, liver or thyroid. This finding may not be serious, but you may need more tests. Your doctor can help you decide what other tests you may need, if any.  What can I expect from the results?  About 1 out of 4 LDCT exams will find something that may need more tests. Most of the time, these findings are lung nodules. Lung nodules are very small collections of tissue in the lung. These nodules are very common, and the vast majority--more than 97 percent--are not cancer (benign). Most are normal lymph nodes or small areas of scarring from past infections.  But, if a small lung nodule is found to be cancer, the cancer can be cured more than 90 percent of the time. To know if the nodule is cancer, we may need to get more images before your next yearly screening exam. If the nodule has suspicious features (for example, it is large, has an odd shape or grows over time), we will refer you to a specialist for further testing.  Will my doctor also get the results?  Yes. Your doctor will get a copy of your results.  Is it okay to keep smoking now that there s a cancer screening exam?  No. Tobacco is one of the strongest cancer-causing agents. It causes not only lung cancer, but other cancers and cardiovascular (heart) diseases as well. The damage caused by smoking builds over time. This means that the longer you smoke, the higher your risk of disease. While it is never too late to quit, the sooner you quit, the  better.  Where can I find help to quit smoking?  The best way to prevent lung cancer is to stop smoking. If you have already quit smoking, congratulations and keep it up! For help on quitting smoking, please call [facility] at 1-466-QUITNOW (1-279.749.6641) or the [employer] at 1-137.961.6841 to find local resources near you.  One-on-one health coaching:  If you d prefer to work individually with a health care provider on tobacco cessation, we offer:      Medication Therapy Management:  Our specially trained pharmacists work closely with you and your doctor to help you quit smoking.  Call [phone] or [phone] (toll free).

## 2023-05-01 NOTE — PROGRESS NOTES
SUBJECTIVE:   CC: Brea is an 57 year old who presents for preventive health visit.       5/1/2023    10:41 AM   Additional Questions   Roomed by Kristina Flores   Accompanied by Self       Patient has been advised of split billing requirements and indicates understanding: Yes     Healthy Habits:     Getting at least 3 servings of Calcium per day:  NO    Bi-annual eye exam:  Yes    Dental care twice a year:  Yes    Sleep apnea or symptoms of sleep apnea:  None    Diet:  Regular (no restrictions)    Frequency of exercise:  None    Taking medications regularly:  Yes    Medication side effects:  None    PHQ-2 Total Score: 0    Additional concerns today:  Yes      Hyperlipidemia Follow-Up      Are you regularly taking any medication or supplement to lower your cholesterol?   Yes- Simvastatin    Are you having muscle aches or other side effects that you think could be caused by your cholesterol lowering medication?  No    Anxiety Follow-Up    How are you doing with your anxiety since your last visit? Improved, Follows with Demetrice Gonzalez CNP    Are you having other symptoms that might be associated with anxiety? No    Have you had a significant life event? No     Are you feeling depressed? No    Do you have any concerns with your use of alcohol or other drugs? No       Right foot lump on lateral aspect. Sometimes painful, sometimes not. Has been present for the past couple months. No inciting injury or trauma. non tender on palpation. Got new shoes. Symptoms have overall improved.    Back -- right low back pain -- not all the time -- more when active at work when she lacey or walk more. Feels better in the AM. Located in posterior right hip.            Social History     Tobacco Use     Smoking status: Every Day     Packs/day: 1.00     Years: 42.00     Pack years: 42.00     Types: Cigarettes     Smokeless tobacco: Never   Vaping Use     Vaping status: Never Used   Substance Use Topics     Alcohol use: Yes     Comment:  occasionally      Drug use: No         6/8/2016     3:44 PM 11/1/2019    12:20 PM 5/1/2023    10:37 AM   JONATHAN-7 SCORE   Total Score   0 (minimal anxiety)   Total Score 9 0 0         8/6/2018    10:31 AM 11/1/2019    12:20 PM 5/1/2023    10:37 AM   PHQ   PHQ-9 Total Score 9 4 0   Q9: Thoughts of better off dead/self-harm past 2 weeks Not at all Not at all Not at all       Today's PHQ-2 Score:       4/24/2023    10:46 AM   PHQ-2 ( 1999 [employer])   Q1: Little interest or pleasure in doing things 0   Q2: Feeling down, depressed or hopeless 0   PHQ-2 Score 0   Q1: Little interest or pleasure in doing things Not at all   Q2: Feeling down, depressed or hopeless Not at all   PHQ-2 Score 0           Social History     Tobacco Use     Smoking status: Every Day     Packs/day: 1.00     Years: 42.00     Pack years: 42.00     Types: Cigarettes     Smokeless tobacco: Never   Vaping Use     Vaping status: Never Used   Substance Use Topics     Alcohol use: Yes     Comment: occasionally            4/24/2023    10:46 AM   Alcohol Use   Prescreen: >3 drinks/day or >7 drinks/week? No     Reviewed orders with patient.  Reviewed health maintenance and updated orders accordingly - Yes  Lab work is in process    Breast Cancer Screening:    FHS-7:       3/11/2022     1:23 PM 4/12/2022    11:27 AM 4/12/2022    11:28 AM 3/21/2023    10:51 AM 4/24/2023    10:48 AM   Breast CA Risk Assessment (FHS-7)   Did any of your first-degree relatives have breast or ovarian cancer? No No  No No   Did any of your relatives have bilateral breast cancer? No No  No No   Did any man in your family have breast cancer? Yes No  No No   Did any woman in your family have breast and ovarian cancer? No No  No No   Did any woman in your family have breast cancer before age 50 y? No No  No No   Do you have 2 or more relatives with breast and/or ovarian cancer? No No  No No   Do you have 2 or more relatives with breast and/or bowel cancer? No No Yes No No       Mammogram  "Screening: Recommended mammography every 1-2 years with patient discussion and risk factor consideration  Pertinent mammograms are reviewed under the imaging tab.    History of abnormal Pap smear:   Last 3 Pap Results:   PAP (no units)   Date Value   01/22/2021 NIL   01/22/2021 NIL   01/22/2021 NIL         Latest Ref Rng & Units 1/22/2021     3:12 PM 1/22/2021     3:11 PM 1/22/2021     3:00 PM   PAP / HPV   PAP (Historical)  NIL      NIL   NIL      HPV 16 DNA NEG^Negative  NEG^Negative   Negative      Negative     HPV 18 DNA NEG^Negative  NEG^Negative   Negative      Negative     Other HR HPV NEG^Negative  NEG^Negative   Negative      Negative       Reviewed and updated as needed this visit by clinical staff   Tobacco  Allergies  Meds              Reviewed and updated as needed this visit by Provider                   Review of Systems   Constitutional: Negative for chills and fever.   HENT: Negative for congestion, ear pain, hearing loss and sore throat.    Eyes: Negative for pain and visual disturbance.   Respiratory: Negative for cough and shortness of breath.    Cardiovascular: Negative for chest pain, palpitations and peripheral edema.   Gastrointestinal: Negative for abdominal pain, constipation, diarrhea, heartburn, hematochezia and nausea.   Breasts:  Negative for tenderness, breast mass and discharge.   Genitourinary: Negative for dysuria, frequency, genital sores, hematuria, pelvic pain, urgency, vaginal bleeding and vaginal discharge.   Musculoskeletal: Positive for myalgias. Negative for arthralgias and joint swelling.   Skin: Negative for rash.   Neurological: Negative for dizziness, weakness, headaches and paresthesias.   Psychiatric/Behavioral: Negative for mood changes. The patient is not nervous/anxious.         OBJECTIVE:   /84   Pulse 70   Temp 98.1  F (36.7  C) (Tympanic)   Resp 20   Ht 1.715 m (5' 7.5\")   Wt 97.3 kg (214 lb 9.6 oz)   LMP 11/22/2006   SpO2 96%   BMI 33.12 kg/m  "   Physical Exam  GENERAL: healthy, alert and no distress  EYES: Eyes grossly normal to inspection  HENT: ear canals and TM's normal, nose and mouth without ulcers or lesions  NECK: no adenopathy and no asymmetry, masses, or scars  RESP: lungs clear to auscultation - no rales, rhonchi or wheezes  CV: regular rate and rhythm, normal S1 S2, no S3 or S4, no murmur, click or rub, no peripheral edema and peripheral pulses strong  MS: no gross musculoskeletal defects noted, no edema  SKIN: no suspicious lesions or rashes  Comprehensive back pain exam:  No tenderness, Range of motion not limited by pain, Lower extremity strength functional and equal on both sides, Lower extremity reflexes within normal limits bilaterally, Lower extremity sensation normal and equal on both sides and Straight leg raise negative bilaterally  PSYCH: mentation appears normal, affect normal/bright        ASSESSMENT/PLAN:   1. Routine general medical examination at a health care facility  Health maintenance reviewed and updated. Emphasized importance of balanced diet and regular exercise.    2. Bipolar II disorder (H)  Stable, following with psychiatry.    3. Hyperlipidemia LDL goal <130  Stable recheck lipids. Continue simvastatin  - Lipid panel reflex to direct LDL Fasting; Future  - simvastatin (ZOCOR) 40 MG tablet; Take 1 tablet (40 mg) by mouth At Bedtime  Dispense: 90 tablet; Refill: 3  - Lipid panel reflex to direct LDL Fasting    4. Tobacco abuse  Emphasized importance of smoking cessation. Renewed nicotine patch/gum Rx.  - nicotine (NICORETTE) 2 MG gum; PLACE 1 PIECE INSIDE CHEEK AS NEEDED FOR SMOKING CESSATION.  Dispense: 100 each; Refill: 3  - nicotine (NICODERM CQ) 21 MG/24HR 24 hr patch; APPLY 1 PATCH TOPICALLY TO THE SKIN EVERY 24 HOURS  Dispense: 28 patch; Refill: 0    5. Personal history of tobacco use  Due for annual lung cancer screening.  - SMOKING CESSATION COUNSELING 3-10 MIN  - Prof fee: Shared Decision Making for Lung Cancer  Screening  - CT Chest Lung Cancer Scrn Low Dose wo; Future    6. High priority for 2019-nCoV vaccine  - COVID-19 BIVALENT 18+ (MODERNA)    7. Screening for diabetes mellitus  - Comprehensive metabolic panel (BMP + Alb, Alk Phos, ALT, AST, Total. Bili, TP); Future  - Comprehensive metabolic panel (BMP + Alb, Alk Phos, ALT, AST, Total. Bili, TP)    8. Immunity status testing  - Hepatitis B Surface Antibody; Future  - Hepatitis B Surface Antibody    9. Encounter for screening mammogram for breast cancer  - *MA Screening Digital Bilateral; Future    10. Pain in right lumbar region of back  Likely muscle pain. No red flags. Continue conservative management at home. If not improving, consider physical therapy.    11. Right foot pain  Nontender along lateral aspect of foot today. If not continuing to improve, should follow up and get XR.      Patient has been advised of split billing requirements and indicates understanding: Yes      COUNSELING:  Reviewed preventive health counseling, as reflected in patient instructions        She reports that she has been smoking cigarettes. She has a 42.00 pack-year smoking history. She has never used smokeless tobacco.  Nicotine/Tobacco Cessation Plan:   Pharmacotherapies : Nicotine patch and Nicotine gum      Abraham Adkins, [facility]  Lung Cancer Screening Shared Decision Making Visit     Brea Aquino, a 57 year old female, is eligible for lung cancer screening    History   Smoking Status     Every Day     Packs/day: 1.00     Years: 42.00     Types: Cigarettes   Smokeless Tobacco     Never       I have discussed with patient the risks and benefits of screening for lung cancer with low-dose CT.     The risks include:    radiation exposure: one low dose chest CT has as much ionizing radiation as about 15 chest x-rays, or 6 months of background radiation living in [place]      false positives: most findings/nodules are NOT cancer, but some might still  require additional diagnostic evaluation, including biopsy    over-diagnosis: some slow growing cancers that might never have been clinically significant will be detected and treated unnecessarily     The benefit of early detection of lung cancer is contingent upon adherence to annual screening or more frequent follow up if indicated.     Furthermore, to benefit from screening, Brea must be willing and able to undergo diagnostic procedures, if indicated. Although no specific guide is available for determining severity of comorbidities, it is reasonable to withhold screening in patients who have greater mortality risk from other diseases.     We did discuss that the best way to prevent lung cancer is to not smoke.    Some patients may value a numeric estimation of lung cancer risk when evaluating if lung cancer screening is right for them, here is one calculator:    ShouldIScreen

## 2023-05-02 LAB
HBV SURFACE AB SERPL IA-ACNC: 0 M[IU]/ML
HBV SURFACE AB SERPL IA-ACNC: NONREACTIVE M[IU]/ML

## 2023-05-05 DIAGNOSIS — E83.52 HYPERCALCEMIA: Primary | ICD-10-CM

## 2023-05-09 ENCOUNTER — ANCILLARY PROCEDURE (OUTPATIENT)
Dept: MAMMOGRAPHY | Facility: CLINIC | Age: 58
End: 2023-05-09
Attending: FAMILY MEDICINE
Payer: COMMERCIAL

## 2023-05-09 DIAGNOSIS — Z12.31 ENCOUNTER FOR SCREENING MAMMOGRAM FOR BREAST CANCER: ICD-10-CM

## 2023-05-09 PROCEDURE — 77063 BREAST TOMOSYNTHESIS BI: CPT | Mod: TC | Performed by: RADIOLOGY

## 2023-05-09 PROCEDURE — 77067 SCR MAMMO BI INCL CAD: CPT | Mod: TC | Performed by: RADIOLOGY

## 2023-05-19 ENCOUNTER — TELEPHONE (OUTPATIENT)
Dept: FAMILY MEDICINE | Facility: CLINIC | Age: 58
End: 2023-05-19
Payer: COMMERCIAL

## 2023-05-19 ENCOUNTER — HOSPITAL ENCOUNTER (OUTPATIENT)
Dept: CT IMAGING | Facility: CLINIC | Age: 58
Discharge: HOME | End: 2023-05-19
Attending: FAMILY MEDICINE | Admitting: FAMILY MEDICINE
Payer: COMMERCIAL

## 2023-05-19 DIAGNOSIS — R91.8 ABNORMAL CT LUNG SCREENING: ICD-10-CM

## 2023-05-19 DIAGNOSIS — Z87.891 PERSONAL HISTORY OF TOBACCO USE: ICD-10-CM

## 2023-05-19 PROCEDURE — 71271 CT THORAX LUNG CANCER SCR C-: CPT

## 2023-05-19 NOTE — TELEPHONE ENCOUNTER
[facility] Radiology Lung Cancer Screening CT result notification:     LDCT/Lung Cancer Screening CT Exam date: 5/19/23  Radiologist Impression AND Recommendations:   ACR Assessment Category:  Lung-RADS Category 3. Probably benign  finding(s)- short term follow up suggested with 6 month low dose CT  (please order exam code IMG 2163).   Pertinent Findings:  Mild changes of centrilobular emphysema in both upper lungs. A  0.4 cm pulmonary nodule in the right middle lobe (series 6 image 153)  is new since the previous exam. A few additional small bilateral  pulmonary nodules are unchanged. For example, a 0.4 cm left lower lobe  pulmonary nodule (series 6 image 227) are unchanged.     Ordering Provider: Abraham Adkins DO Sheri A Carter did not receive the remaining radiology results from their PCP.        Lung Nodule Program Protocol recommendation [Pertaining to lung nodules]:    Lung RADS 3 Protocol: Results RN to notify Patient of results/recommendations and place order for 6 month CT ([id number]) - MD worthy  o CT Chest order ([id number]) placed to be completed within 6 months (Yes/No):  YES due on or after 11/19/2023.  Image scheduling will contact Patient 1 month prior to due date.    RN communicated the lung nodule finding to the patient (Yes/No):  YES  The patient had the following questions: what is a lung nodule?  Correct letter sent as per [facility] Lung nodule protocol (Yes/No):  YES  Did Patient have any CT's of chest previous? (Yes/No/NA) YES - radiology noted comparison  If no comparisons used, inquire if patient has had any chest CT's in the past and if so, request priors.    If scheduling LDCT only, RN will contact Image Scheduling Team  Hours available (all sites below):  Mon-Fri 7A to 7P; Sat 7A to 3:30P.  No schedulers available on Sunday.    [facility] ([place] and [place]): [phone]    North region ([place], [place]): [phone]    South region ([facility]):  [phone]    [facility] region ([facility]): [phone]    Lung Nodule Clinics    Pulomonary (Lung Care) Clinic at [facility]  Floor 2, Check-In D, [address], [place], [place]  Hours: Mon - Fri 7:00 AM to 5:00 PM    [facility]   Floor 2, [address], [place]  [phone]  Hours: Mon - Fri 7:00 AM - 7:00 PM;  Sat 8:00 AM to 12:00 PM (noon)    [facility] at [facility]  [address], [place], [place], [phone]  Hours: Mon - Fri 8:00 AM - 4:30 PM    [facility]  [address] [address]  Hours: Mon-Thurs 7:00 AM- 6:30 PM;  Friday 7:00 AM- 5:30 PM    [facility] [place]  [address] [place] [address] [phone]  Hours: Mon -Thurs 7:00 AM-7:00 PM;  Friday 12:00 PM (noon) - 4 PM      Jeb Maldonado RN  [facility] "[employer]  Lung Nodule and Emergency Dept Lab Result RN  Ph# [phone]

## 2023-05-21 DIAGNOSIS — R91.8 PULMONARY NODULES: Primary | ICD-10-CM

## 2023-05-21 DIAGNOSIS — Z12.2 ENCOUNTER FOR SCREENING FOR LUNG CANCER: ICD-10-CM

## 2023-05-30 ENCOUNTER — MYC MEDICAL ADVICE (OUTPATIENT)
Dept: FAMILY MEDICINE | Facility: CLINIC | Age: 58
End: 2023-05-30
Payer: COMMERCIAL

## 2023-05-31 NOTE — TELEPHONE ENCOUNTER
My chart message sent     Danica Ahn RN, BSN  [facility]

## 2023-06-02 ENCOUNTER — LAB (OUTPATIENT)
Dept: LAB | Facility: CLINIC | Age: 58
End: 2023-06-02
Payer: COMMERCIAL

## 2023-06-02 DIAGNOSIS — E83.52 HYPERCALCEMIA: ICD-10-CM

## 2023-06-02 LAB
CA-I BLD-MCNC: 5 MG/DL (ref 4.4–5.2)
PTH-INTACT SERPL-MCNC: 39 PG/ML (ref 15–65)

## 2023-06-02 PROCEDURE — 82330 ASSAY OF CALCIUM: CPT

## 2023-06-02 PROCEDURE — 36415 COLL VENOUS BLD VENIPUNCTURE: CPT

## 2023-06-02 PROCEDURE — 83970 ASSAY OF PARATHORMONE: CPT

## 2023-06-02 PROCEDURE — 82306 VITAMIN D 25 HYDROXY: CPT

## 2023-06-02 PROCEDURE — 82652 VIT D 1 25-DIHYDROXY: CPT

## 2023-06-03 LAB — DEPRECATED CALCIDIOL+CALCIFEROL SERPL-MC: 9 UG/L (ref 20–75)

## 2023-06-07 LAB — 1,25(OH)2D SERPL-MCNC: 41 PG/ML (ref 19.9–79.3)

## 2023-06-25 DIAGNOSIS — Z72.0 TOBACCO ABUSE: ICD-10-CM

## 2023-06-27 RX ORDER — NICOTINE 21 MG/24HR
PATCH, TRANSDERMAL 24 HOURS TRANSDERMAL
Qty: 28 PATCH | Refills: 0 | Status: SHIPPED | OUTPATIENT
Start: 2023-06-27 | End: 2023-10-11

## 2023-07-21 ENCOUNTER — MYC MEDICAL ADVICE (OUTPATIENT)
Dept: FAMILY MEDICINE | Facility: CLINIC | Age: 58
End: 2023-07-21
Payer: COMMERCIAL

## 2023-07-21 DIAGNOSIS — Z72.0 TOBACCO ABUSE: Primary | ICD-10-CM

## 2023-07-21 RX ORDER — NICOTINE 21 MG/24HR
1 PATCH, TRANSDERMAL 24 HOURS TRANSDERMAL EVERY 24 HOURS
Qty: 28 PATCH | Refills: 0 | Status: SHIPPED | OUTPATIENT
Start: 2023-07-21 | End: 2023-08-30

## 2023-08-29 DIAGNOSIS — Z72.0 TOBACCO ABUSE: ICD-10-CM

## 2023-08-30 RX ORDER — NICOTINE 21 MG/24HR
PATCH, TRANSDERMAL 24 HOURS TRANSDERMAL
Qty: 28 PATCH | Refills: 1 | Status: SHIPPED | OUTPATIENT
Start: 2023-08-30 | End: 2024-06-14

## 2023-10-10 DIAGNOSIS — Z72.0 TOBACCO ABUSE: ICD-10-CM

## 2023-10-11 RX ORDER — NICOTINE 21 MG/24HR
PATCH, TRANSDERMAL 24 HOURS TRANSDERMAL
Qty: 28 PATCH | Refills: 3 | Status: SHIPPED | OUTPATIENT
Start: 2023-10-11 | End: 2024-02-09

## 2023-11-11 ENCOUNTER — HOSPITAL ENCOUNTER (OUTPATIENT)
Dept: CT IMAGING | Facility: CLINIC | Age: 58
Discharge: HOME | End: 2023-11-11
Attending: FAMILY MEDICINE | Admitting: FAMILY MEDICINE
Payer: COMMERCIAL

## 2023-11-11 DIAGNOSIS — R91.8 ABNORMAL CT LUNG SCREENING: ICD-10-CM

## 2023-11-11 PROCEDURE — 71250 CT THORAX DX C-: CPT

## 2023-11-13 PROBLEM — R91.8 ABNORMAL CT LUNG SCREENING: Status: RESOLVED | Noted: 2023-05-19 | Resolved: 2023-11-13

## 2023-11-15 DIAGNOSIS — R93.89 ABNORMAL IMAGING OF THYROID: Primary | ICD-10-CM

## 2023-11-27 ENCOUNTER — HOSPITAL ENCOUNTER (OUTPATIENT)
Dept: ULTRASOUND IMAGING | Facility: CLINIC | Age: 58
Discharge: HOME | End: 2023-11-27
Attending: FAMILY MEDICINE | Admitting: FAMILY MEDICINE
Payer: COMMERCIAL

## 2023-11-27 DIAGNOSIS — R93.89 ABNORMAL IMAGING OF THYROID: ICD-10-CM

## 2023-11-27 PROCEDURE — 76536 US EXAM OF HEAD AND NECK: CPT

## 2024-02-09 ENCOUNTER — OFFICE VISIT (OUTPATIENT)
Dept: FAMILY MEDICINE | Facility: CLINIC | Age: 59
End: 2024-02-09
Payer: COMMERCIAL

## 2024-02-09 ENCOUNTER — MYC MEDICAL ADVICE (OUTPATIENT)
Dept: FAMILY MEDICINE | Facility: CLINIC | Age: 59
End: 2024-02-09

## 2024-02-09 VITALS
HEIGHT: 68 IN | DIASTOLIC BLOOD PRESSURE: 76 MMHG | RESPIRATION RATE: 18 BRPM | WEIGHT: 215 LBS | SYSTOLIC BLOOD PRESSURE: 112 MMHG | OXYGEN SATURATION: 96 % | TEMPERATURE: 97.6 F | BODY MASS INDEX: 32.58 KG/M2 | HEART RATE: 89 BPM

## 2024-02-09 DIAGNOSIS — F31.81 BIPOLAR II DISORDER (H): ICD-10-CM

## 2024-02-09 DIAGNOSIS — B35.1 ONYCHOMYCOSIS: Primary | ICD-10-CM

## 2024-02-09 DIAGNOSIS — Z72.0 TOBACCO ABUSE: ICD-10-CM

## 2024-02-09 LAB
ALBUMIN SERPL BCG-MCNC: 4.4 G/DL (ref 3.5–5.2)
ALP SERPL-CCNC: 67 U/L (ref 40–150)
ALT SERPL W P-5'-P-CCNC: 16 U/L (ref 0–50)
AST SERPL W P-5'-P-CCNC: 18 U/L (ref 0–45)
BILIRUB DIRECT SERPL-MCNC: <0.2 MG/DL (ref 0–0.3)
BILIRUB SERPL-MCNC: 0.5 MG/DL
PROT SERPL-MCNC: 6.9 G/DL (ref 6.4–8.3)

## 2024-02-09 PROCEDURE — 80076 HEPATIC FUNCTION PANEL: CPT | Performed by: FAMILY MEDICINE

## 2024-02-09 PROCEDURE — 36415 COLL VENOUS BLD VENIPUNCTURE: CPT | Performed by: FAMILY MEDICINE

## 2024-02-09 PROCEDURE — 99214 OFFICE O/P EST MOD 30 MIN: CPT | Performed by: FAMILY MEDICINE

## 2024-02-09 RX ORDER — TERBINAFINE HYDROCHLORIDE 250 MG/1
250 TABLET ORAL DAILY
Qty: 90 TABLET | Refills: 0 | Status: SHIPPED | OUTPATIENT
Start: 2024-02-09 | End: 2024-05-09

## 2024-02-09 RX ORDER — BRINZOLAMIDE 10 MG/ML
SUSPENSION/ DROPS OPHTHALMIC
COMMUNITY
Start: 2024-02-08

## 2024-02-09 RX ORDER — NICOTINE 21 MG/24HR
PATCH, TRANSDERMAL 24 HOURS TRANSDERMAL
Qty: 28 PATCH | Refills: 3 | Status: SHIPPED | OUTPATIENT
Start: 2024-02-09 | End: 2024-06-14

## 2024-02-09 RX ORDER — BIMATOPROST 0.3 MG/ML
SOLUTION/ DROPS OPHTHALMIC
COMMUNITY
Start: 2024-01-01

## 2024-02-09 NOTE — PROGRESS NOTES
"  Assessment & Plan     Onychomycosis  - terbinafine (LAMISIL) 250 MG tablet; Take 1 tablet (250 mg) by mouth daily for 90 days  - Hepatic panel (Albumin, ALT, AST, Bili, Alk Phos, TP); Future  - Hepatic panel (Albumin, ALT, AST, Bili, Alk Phos, TP); Future  - Hepatic panel (Albumin, ALT, AST, Bili, Alk Phos, TP)    Tobacco abuse  - nicotine (NICODERM CQ) 21 MG/24HR 24 hr patch; APPLY 1 PATCH TOPICALLY TO THE SKIN EVERY 24 HOURS    Bipolar II disorder (H)  Stable, following with psychiatry.            BMI  Estimated body mass index is 33.18 kg/m  as calculated from the following:    Height as of this encounter: 1.715 m (5' 7.5\").    Weight as of this encounter: 97.5 kg (215 lb).             Michelle Guidry is a 58 year old, presenting for the following health issues:  [facility]        2/9/2024    10:18 AM   Additional Questions   Roomed by Lindsey Thomas    History of Present Illness       Reason for visit:  Bump on head and thick toenails  Symptom onset:  More than a month  Symptom intensity:  Moderate  Symptom progression:  Staying the same  Had these symptoms before:  No    She eats 0-1 servings of fruits and vegetables daily.She consumes 2 sweetened beverage(s) daily.She exercises with enough effort to increase her heart rate 9 or less minutes per day.  She exercises with enough effort to increase her heart rate 3 or less days per week.   She is taking medications regularly.     Skin lesion: She has noticed a lump on her scalp for the past several months. Recently, it feels like it has even gotten smaller. No pain, itching, soreness. No drainage or bleeding.    Toenails: toenails on both feet are thick with debris underneath, harder to trim. They look discolored as well.          Review of Systems  Constitutional, HEENT, cardiovascular, pulmonary, gi and gu systems are negative, except as otherwise noted.      Objective    /76   Pulse 89   Temp 97.6  F (36.4  C)   Resp 18   Ht 1.715 m (5' 7.5\")   Wt " 97.5 kg (215 lb)   LMP 11/22/2006   SpO2 96%   BMI 33.18 kg/m    Body mass index is 33.18 kg/m .  Physical Exam   GENERAL: alert and no distress  SKIN: raised, slightly hypopigmented, thickened stuck on appearing lesion on top of scalp; thickened discolored toenails on bilateral feet          Signed Electronically by: Abraham Adkins DO

## 2024-05-24 DIAGNOSIS — E78.5 HYPERLIPIDEMIA LDL GOAL <130: ICD-10-CM

## 2024-05-24 DIAGNOSIS — K21.9 GASTROESOPHAGEAL REFLUX DISEASE WITHOUT ESOPHAGITIS: ICD-10-CM

## 2024-05-24 RX ORDER — OMEPRAZOLE 40 MG/1
CAPSULE, DELAYED RELEASE ORAL
Qty: 90 CAPSULE | Refills: 2 | Status: SHIPPED | OUTPATIENT
Start: 2024-05-24

## 2024-05-24 RX ORDER — SIMVASTATIN 40 MG
40 TABLET ORAL AT BEDTIME
Qty: 90 TABLET | Refills: 3 | Status: SHIPPED | OUTPATIENT
Start: 2024-05-24

## 2024-05-24 NOTE — TELEPHONE ENCOUNTER
Prescription approved per [facility] Refill Protocol.  Lesvia Ewing, RN  [facility] Triage Nurse

## 2024-06-11 SDOH — HEALTH STABILITY: PHYSICAL HEALTH: ON AVERAGE, HOW MANY DAYS PER WEEK DO YOU ENGAGE IN MODERATE TO STRENUOUS EXERCISE (LIKE A BRISK WALK)?: 0 DAYS

## 2024-06-11 SDOH — HEALTH STABILITY: PHYSICAL HEALTH: ON AVERAGE, HOW MANY MINUTES DO YOU ENGAGE IN EXERCISE AT THIS LEVEL?: 0 MIN

## 2024-06-11 ASSESSMENT — SOCIAL DETERMINANTS OF HEALTH (SDOH): HOW OFTEN DO YOU GET TOGETHER WITH FRIENDS OR RELATIVES?: ONCE A WEEK

## 2024-06-14 ENCOUNTER — OFFICE VISIT (OUTPATIENT)
Dept: FAMILY MEDICINE | Facility: CLINIC | Age: 59
End: 2024-06-14
Attending: FAMILY MEDICINE
Payer: COMMERCIAL

## 2024-06-14 VITALS
HEIGHT: 68 IN | WEIGHT: 201.4 LBS | RESPIRATION RATE: 16 BRPM | HEART RATE: 83 BPM | DIASTOLIC BLOOD PRESSURE: 80 MMHG | TEMPERATURE: 98.5 F | BODY MASS INDEX: 30.52 KG/M2 | SYSTOLIC BLOOD PRESSURE: 132 MMHG | OXYGEN SATURATION: 97 %

## 2024-06-14 DIAGNOSIS — R30.0 DYSURIA: ICD-10-CM

## 2024-06-14 DIAGNOSIS — E04.1 THYROID NODULE: ICD-10-CM

## 2024-06-14 DIAGNOSIS — Z13.0 SCREENING FOR DEFICIENCY ANEMIA: ICD-10-CM

## 2024-06-14 DIAGNOSIS — B35.1 ONYCHOMYCOSIS: ICD-10-CM

## 2024-06-14 DIAGNOSIS — Z00.00 ROUTINE GENERAL MEDICAL EXAMINATION AT A HEALTH CARE FACILITY: Primary | ICD-10-CM

## 2024-06-14 DIAGNOSIS — E78.5 HYPERLIPIDEMIA LDL GOAL <130: ICD-10-CM

## 2024-06-14 DIAGNOSIS — Z87.891 PERSONAL HISTORY OF TOBACCO USE: ICD-10-CM

## 2024-06-14 DIAGNOSIS — F17.200 NICOTINE DEPENDENCE, UNCOMPLICATED, UNSPECIFIED NICOTINE PRODUCT TYPE: ICD-10-CM

## 2024-06-14 DIAGNOSIS — Z13.1 SCREENING FOR DIABETES MELLITUS: ICD-10-CM

## 2024-06-14 DIAGNOSIS — Z12.31 ENCOUNTER FOR SCREENING MAMMOGRAM FOR BREAST CANCER: ICD-10-CM

## 2024-06-14 DIAGNOSIS — Z13.29 SCREENING FOR THYROID DISORDER: ICD-10-CM

## 2024-06-14 DIAGNOSIS — E55.9 VITAMIN D DEFICIENCY: ICD-10-CM

## 2024-06-14 LAB
ALBUMIN UR-MCNC: ABNORMAL MG/DL
APPEARANCE UR: CLEAR
BACTERIA #/AREA URNS HPF: ABNORMAL /HPF
BASOPHILS # BLD AUTO: 0 10E3/UL (ref 0–0.2)
BASOPHILS NFR BLD AUTO: 0 %
BILIRUB UR QL STRIP: ABNORMAL
COLOR UR AUTO: YELLOW
EOSINOPHIL # BLD AUTO: 0.1 10E3/UL (ref 0–0.7)
EOSINOPHIL NFR BLD AUTO: 1 %
ERYTHROCYTE [DISTWIDTH] IN BLOOD BY AUTOMATED COUNT: 12.7 % (ref 10–15)
GLUCOSE UR STRIP-MCNC: NEGATIVE MG/DL
HBA1C MFR BLD: 5.2 % (ref 0–5.6)
HCT VFR BLD AUTO: 47 % (ref 35–47)
HGB BLD-MCNC: 16 G/DL (ref 11.7–15.7)
HGB UR QL STRIP: NEGATIVE
IMM GRANULOCYTES # BLD: 0 10E3/UL
IMM GRANULOCYTES NFR BLD: 0 %
KETONES UR STRIP-MCNC: ABNORMAL MG/DL
LEUKOCYTE ESTERASE UR QL STRIP: NEGATIVE
LYMPHOCYTES # BLD AUTO: 1.9 10E3/UL (ref 0.8–5.3)
LYMPHOCYTES NFR BLD AUTO: 27 %
MCH RBC QN AUTO: 32 PG (ref 26.5–33)
MCHC RBC AUTO-ENTMCNC: 34 G/DL (ref 31.5–36.5)
MCV RBC AUTO: 94 FL (ref 78–100)
MONOCYTES # BLD AUTO: 0.4 10E3/UL (ref 0–1.3)
MONOCYTES NFR BLD AUTO: 6 %
MUCOUS THREADS #/AREA URNS LPF: PRESENT /LPF
NEUTROPHILS # BLD AUTO: 4.5 10E3/UL (ref 1.6–8.3)
NEUTROPHILS NFR BLD AUTO: 65 %
NITRATE UR QL: NEGATIVE
PH UR STRIP: 5.5 [PH] (ref 5–7)
PLATELET # BLD AUTO: 292 10E3/UL (ref 150–450)
RBC # BLD AUTO: 5 10E6/UL (ref 3.8–5.2)
RBC #/AREA URNS AUTO: ABNORMAL /HPF
SP GR UR STRIP: >=1.03 (ref 1–1.03)
UROBILINOGEN UR STRIP-ACNC: 0.2 E.U./DL
WBC # BLD AUTO: 6.9 10E3/UL (ref 4–11)
WBC #/AREA URNS AUTO: ABNORMAL /HPF

## 2024-06-14 PROCEDURE — 80053 COMPREHEN METABOLIC PANEL: CPT | Performed by: FAMILY MEDICINE

## 2024-06-14 PROCEDURE — 36415 COLL VENOUS BLD VENIPUNCTURE: CPT | Performed by: FAMILY MEDICINE

## 2024-06-14 PROCEDURE — 82248 BILIRUBIN DIRECT: CPT | Performed by: FAMILY MEDICINE

## 2024-06-14 PROCEDURE — 99213 OFFICE O/P EST LOW 20 MIN: CPT | Mod: 25 | Performed by: FAMILY MEDICINE

## 2024-06-14 PROCEDURE — 81001 URINALYSIS AUTO W/SCOPE: CPT | Performed by: FAMILY MEDICINE

## 2024-06-14 PROCEDURE — 99396 PREV VISIT EST AGE 40-64: CPT | Performed by: FAMILY MEDICINE

## 2024-06-14 PROCEDURE — 99406 BEHAV CHNG SMOKING 3-10 MIN: CPT | Performed by: FAMILY MEDICINE

## 2024-06-14 PROCEDURE — 85025 COMPLETE CBC W/AUTO DIFF WBC: CPT | Performed by: FAMILY MEDICINE

## 2024-06-14 PROCEDURE — 80061 LIPID PANEL: CPT | Performed by: FAMILY MEDICINE

## 2024-06-14 PROCEDURE — 82306 VITAMIN D 25 HYDROXY: CPT | Performed by: FAMILY MEDICINE

## 2024-06-14 PROCEDURE — 83036 HEMOGLOBIN GLYCOSYLATED A1C: CPT | Performed by: FAMILY MEDICINE

## 2024-06-14 PROCEDURE — 84443 ASSAY THYROID STIM HORMONE: CPT | Performed by: FAMILY MEDICINE

## 2024-06-14 PROCEDURE — G0296 VISIT TO DETERM LDCT ELIG: HCPCS | Performed by: FAMILY MEDICINE

## 2024-06-14 RX ORDER — NICOTINE 21 MG/24HR
1 PATCH, TRANSDERMAL 24 HOURS TRANSDERMAL EVERY 24 HOURS
Qty: 42 PATCH | Refills: 0 | Status: SHIPPED | OUTPATIENT
Start: 2024-06-14 | End: 2024-07-26

## 2024-06-14 RX ORDER — NICOTINE 21 MG/24HR
1 PATCH, TRANSDERMAL 24 HOURS TRANSDERMAL EVERY 24 HOURS
Qty: 14 PATCH | Refills: 0 | Status: SHIPPED | OUTPATIENT
Start: 2024-07-26 | End: 2024-08-09

## 2024-06-14 ASSESSMENT — ANXIETY QUESTIONNAIRES
4. TROUBLE RELAXING: NOT AT ALL
7. FEELING AFRAID AS IF SOMETHING AWFUL MIGHT HAPPEN: NOT AT ALL
8. IF YOU CHECKED OFF ANY PROBLEMS, HOW DIFFICULT HAVE THESE MADE IT FOR YOU TO DO YOUR WORK, TAKE CARE OF THINGS AT HOME, OR GET ALONG WITH OTHER PEOPLE?: NOT DIFFICULT AT ALL
2. NOT BEING ABLE TO STOP OR CONTROL WORRYING: NOT AT ALL
GAD7 TOTAL SCORE: 0
6. BECOMING EASILY ANNOYED OR IRRITABLE: NOT AT ALL
GAD7 TOTAL SCORE: 0
7. FEELING AFRAID AS IF SOMETHING AWFUL MIGHT HAPPEN: NOT AT ALL
IF YOU CHECKED OFF ANY PROBLEMS ON THIS QUESTIONNAIRE, HOW DIFFICULT HAVE THESE PROBLEMS MADE IT FOR YOU TO DO YOUR WORK, TAKE CARE OF THINGS AT HOME, OR GET ALONG WITH OTHER PEOPLE: NOT DIFFICULT AT ALL
5. BEING SO RESTLESS THAT IT IS HARD TO SIT STILL: NOT AT ALL
GAD7 TOTAL SCORE: 0
1. FEELING NERVOUS, ANXIOUS, OR ON EDGE: NOT AT ALL
3. WORRYING TOO MUCH ABOUT DIFFERENT THINGS: NOT AT ALL

## 2024-06-14 ASSESSMENT — PATIENT HEALTH QUESTIONNAIRE - PHQ9
SUM OF ALL RESPONSES TO PHQ QUESTIONS 1-9: 3
10. IF YOU CHECKED OFF ANY PROBLEMS, HOW DIFFICULT HAVE THESE PROBLEMS MADE IT FOR YOU TO DO YOUR WORK, TAKE CARE OF THINGS AT HOME, OR GET ALONG WITH OTHER PEOPLE: NOT DIFFICULT AT ALL
SUM OF ALL RESPONSES TO PHQ QUESTIONS 1-9: 3

## 2024-06-14 NOTE — PROGRESS NOTES
Preventive Care Visit  [facility] [place]  Abraham Adkins DO, Family Medicine  Jun 14, 2024      Assessment & Plan     Routine general medical examination at a health care facility  Health maintenance reviewed and updated. Emphasized importance of balanced diet and regular exercise.      Screening for diabetes mellitus  - Comprehensive metabolic panel (BMP + Alb, Alk Phos, ALT, AST, Total. Bili, TP); Future  - Hemoglobin A1c; Future  - Comprehensive metabolic panel (BMP + Alb, Alk Phos, ALT, AST, Total. Bili, TP)  - Hemoglobin A1c    Screening for deficiency anemia  - CBC with platelets and differential; Future  - CBC with platelets and differential    Screening for thyroid disorder  - TSH with free T4 reflex; Future  - TSH with free T4 reflex    Nicotine dependence, uncomplicated, unspecified nicotine product type  Start nicotine replacement therapy, encourage smoking cessation efforts.  - nicotine (NICODERM CQ) 21 MG/24HR 24 hr patch; Place 1 patch onto the skin every 24 hours for 42 days  - nicotine (NICODERM CQ) 14 MG/24HR 24 hr patch; Place 1 patch onto the skin every 24 hours for 14 days  - nicotine (NICODERM CQ) 7 MG/24HR 24 hr patch; Place 1 patch onto the skin every 24 hours for 14 days  - nicotine (NICORETTE) 4 MG lozenge; How to take it: When the urge to smoke occurs, suck (do not chew) on 1 lozenge to release nicotine. Do not eat or drink while the lozenge is in your mouth. Follow this schedule: Weeks 1 to 6: One lozenge every 1 to 2 hours. Use at least 9 lozenges a day, but no more than 20. Weeks 7 to 9: One lozenge every 2 to 4 hours. Weeks 10 to 12: One lozenge every 4 to 8 hours  - SMOKING CESSATION COUNSELING 3-10 MIN    Personal history of tobacco use  - Prof fee: Shared Decision Making for Lung Cancer Screening  - CT Chest Lung Cancer Scrn Low Dose wo; Future    Hyperlipidemia LDL goal <130  Continue simvastatin.  - Lipid panel reflex to direct LDL Fasting; Future  - Lipid  "panel reflex to direct LDL Fasting    Dysuria  Has noticed intermittent dysuria. Check urinalysis to rule out infection  - UA Macroscopic with reflex to Microscopic and Culture - Lab Collect; Future  - UA Macroscopic with reflex to Microscopic and Culture - Lab Collect    Vitamin D deficiency  Recheck vitamin D  - Vitamin D Deficiency; Future  - Vitamin D Deficiency    Encounter for screening mammogram for breast cancer  - [place] Screen Bilateral w/[place]; Future    Thyroid nodule  Due to recheck thyroid ultrasound in November. Future order placed.  - US Thyroid; Future    Onychomycosis  - Bilirubin direct    Patient has been advised of split billing requirements and indicates understanding: Yes      Nicotine/Tobacco Cessation  She reports that she has been smoking cigarettes. She has a 42 pack-year smoking history. She has never used smokeless tobacco.  Nicotine/Tobacco Cessation Plan  Pharmacotherapies : Nicotine patch and Nicotine lozenge      BMI  Estimated body mass index is 31.08 kg/m  as calculated from the following:    Height as of this encounter: 1.715 m (5' 7.5\").    Weight as of this encounter: 91.4 kg (201 lb 6.4 oz).   Weight management plan: Discussed healthy diet and exercise guidelines    Counseling  Appropriate preventive services were discussed with this patient, including applicable screening as appropriate for fall prevention, nutrition, physical activity, Tobacco-use cessation, weight loss and cognition.  Checklist reviewing preventive services available has been given to the patient.  Reviewed patient's diet, addressing concerns and/or questions.   The patient reports drinking more than 3 alcoholic drinks per day and/or more than 7 drhnks per week. The patient was counseled and given information about possible harmful effects of excessive alcohol intake.        Michelle Guidry is a 58 year old, presenting for the following:  Physical (fasting)        6/14/2024     1:21 PM   Additional Questions "   Roomed by [place]        Health Care Directive  Patient does not have a Health Care Directive or Living Will: Discussed advance care planning with patient; however, patient declined at this time.    HPI      Hyperlipidemia Follow-Up    Are you regularly taking any medication or supplement to lower your cholesterol?   Yes-    Are you having muscle aches or other side effects that you think could be caused by your cholesterol lowering medication?  No    Anxiety   How are you doing with your anxiety since your last visit? stable  Are you having other symptoms that might be associated with anxiety? No  Have you had a significant life event? No   Are you feeling depressed? No  Do you have any concerns with your use of alcohol or other drugs? No    Following with psychiatry.      Social History     Tobacco Use    Smoking status: Every Day     Current packs/day: 1.00     Average packs/day: 1 pack/day for 42.0 years (42.0 ttl pk-yrs)     Types: Cigarettes    Smokeless tobacco: Never   Vaping Use    Vaping status: Never Used   Substance Use Topics    Alcohol use: Yes     Comment: occasionally     Drug use: No         11/1/2019    12:20 PM 5/1/2023    10:37 AM 6/14/2024     1:14 PM   JONATHAN-7 SCORE   Total Score  0 (minimal anxiety) 0 (minimal anxiety)   Total Score 0 0 0         5/1/2023    10:37 AM 2/9/2024     8:42 AM 6/14/2024     1:13 PM   PHQ   PHQ-9 Total Score 0 0 3   Q9: Thoughts of better off dead/self-harm past 2 weeks Not at all Not at all Not at all         6/14/2024     1:13 PM   Last PHQ-9   1.  Little interest or pleasure in doing things 0   2.  Feeling down, depressed, or hopeless 0   3.  Trouble falling or staying asleep, or sleeping too much 0   4.  Feeling tired or having little energy 0   5.  Poor appetite or overeating 3   6.  Feeling bad about yourself 0   7.  Trouble concentrating 0   8.  Moving slowly or restless 0   Q9: Thoughts of better off dead/self-harm past 2 weeks 0   PHQ-9 Total Score 3          6/14/2024     1:14 PM   JONATHAN-7    1. Feeling nervous, anxious, or on edge 0   2. Not being able to stop or control worrying 0   3. Worrying too much about different things 0   4. Trouble relaxing 0   5. Being so restless that it is hard to sit still 0   6. Becoming easily annoyed or irritable 0   7. Feeling afraid, as if something awful might happen 0   JONATHAN-7 Total Score 0   If you checked any problems, how difficult have they made it for you to do your work, take care of things at home, or get along with other people? Not difficult at all           6/11/2024   General Health   How would you rate your overall physical health? (!) FAIR   Feel stress (tense, anxious, or unable to sleep) Not at all         6/11/2024   Nutrition   Three or more servings of calcium each day? (!) NO   Diet: Regular (no restrictions)   How many servings of fruit and vegetables per day? (!) 0-1   How many sweetened beverages each day? 0-1         6/11/2024   Exercise   Days per week of moderate/strenous exercise 0 days   Average minutes spent exercising at this level 0 min   (!) EXERCISE CONCERN      6/11/2024   Social Factors   Frequency of gathering with friends or relatives Once a week   Worry food won't last until get money to buy more No   Food not last or not have enough money for food? No   Do you have housing?  Yes   Are you worried about losing your housing? No   Lack of transportation? No   Unable to get utilities (heat,electricity)? No         6/14/2024   Fall Risk   Gait Speed Test (Document in seconds) 3.5   Gait Speed Test Interpretation Less than or equal to 5.00 seconds - PASS          6/11/2024   Dental   Dentist two times every year? Yes         6/11/2024   TB Screening   Were you born outside of the [place]? No       Today's PHQ-9 Score:       6/14/2024     1:13 PM   PHQ-9 SCORE   PHQ-9 Total Score MyChart 3 (Minimal depression)   PHQ-9 Total Score 3         6/11/2024   Substance Use   If I could quit smoking, I would Somewhat  agree   I want to quit somking, worry about health affects Completely agree   Willing to make a plan to quit smoking Somewhat agree   Willing to cut down before quitting Somewhat agree   Alcohol more than 3/day or more than 7/wk Yes   How often do you have a drink containing alcohol 2 to 3 times a week   How many alcohol drinks on typical day 1 or 2   How often do you have 5+ drinks at one occasion Less than monthly   Audit 2/3 Score 1   How often not able to stop drinking once started Never   How often failed to do what normally expected Never   How often needed first drink in am after a heavy drinking session Never   How often feeling of guilt or remorse after drinking Never   How often unable to remember what happened the night before Never   Have you or someone else been injured because of your drinking No   Has anyone been concerned or suggested you cut down on drinking No   TOTAL SCORE - AUDIT 4   Do you use any other substances recreationally? (!) ALCOHOL     Social History     Tobacco Use    Smoking status: Every Day     Current packs/day: 1.00     Average packs/day: 1 pack/day for 42.0 years (42.0 ttl pk-yrs)     Types: Cigarettes    Smokeless tobacco: Never   Vaping Use    Vaping status: Never Used   Substance Use Topics    Alcohol use: Yes     Comment: occasionally     Drug use: No           5/9/2023   LAST FHS-7 RESULTS   1st degree relative breast or ovarian cancer No   Any relative bilateral breast cancer No   Any male have breast cancer No   Any ONE woman have BOTH breast AND ovarian cancer No   Any woman with breast cancer before 50yrs No   2 or more relatives with breast AND/OR ovarian cancer No   2 or more relatives with breast AND/OR bowel cancer No        Mammogram Screening - Mammogram every 1-2 years updated in Health Maintenance based on mutual decision making        6/11/2024   STI Screening   New sexual partner(s) since last STI/HIV test? No     History of abnormal Pap smear: No - age 30-64  HPV with reflex Pap every 5 years recommended        Latest Ref Rng & Units 2021     3:12 PM 2021     3:11 PM 2021     3:00 PM   PAP / HPV   PAP (Historical)  NIL     NIL  NIL     HPV 16 DNA NEG^Negative  NEG^Negative   Negative     Negative    HPV 18 DNA NEG^Negative  NEG^Negative   Negative     Negative    Other HR HPV NEG^Negative  NEG^Negative   Negative     Negative      ASCVD Risk   The 10-year ASCVD risk score (Nata KEVIN, et al., 2019) is: 5.1%    Values used to calculate the score:      Age: 58 years      Sex: Female      Is Non- : No      Diabetic: No      Tobacco smoker: Yes      Systolic Blood Pressure: 132 mmHg      Is BP treated: No      HDL Cholesterol: 64 mg/dL      Total Cholesterol: 177 mg/dL           Reviewed and updated as needed this visit by Provider                    Past Medical History:   Diagnosis Date    Depressive disorder     Bipolar 2    Endometriosis, site unspecified 10/4/2007    MEDICAL HISTORY OF -     2 Cervix    Other and unspecified hyperlipidemia 2006    RECURR DEPRESSION-FULL REM 10/25/2006    Tobacco use disorder 2006     Past Surgical History:   Procedure Laterality Date    C ANALGESIA,EPIDURAL,LABOR &       COLONOSCOPY  2014    Dr. Estrada [facility]    COLONOSCOPY       [place] [facility]    ESOPHAGOSCOPY, GASTROSCOPY, DUODENOSCOPY (EGD), COMBINED  2014    Procedure: COMBINED ESOPHAGOSCOPY, GASTROSCOPY, DUODENOSCOPY (EGD);  Colonoscopy, & EGD with polypectomy at sigmoid colon using hot snare;  Surgeon: Floyd Estrada MD;  Location: [facility]    HYSTERECTOMY, PAP STILL INDICATED  07    Laparoscopic Supracervical, benign path    LAPAROSCOPY PROCEDURE UNLISTED      For Endometriosis    SURGICAL HISTORY OF -   08    Incision of 2 infected cysts-axilla, and abdomen    SURGICAL HISTORY OF -   2010    Removal of Epidermoid Cyst in Rt. Axillary area    TUBAL LIGATION    "        Review of Systems  Constitutional, HEENT, cardiovascular, pulmonary, gi and gu systems are negative, except as otherwise noted.     Objective    Exam  /80   Pulse 83   Temp 98.5  F (36.9  C) (Oral)   Resp 16   Ht 1.715 m (5' 7.5\")   Wt 91.4 kg (201 lb 6.4 oz)   LMP 11/22/2006   SpO2 97%   BMI 31.08 kg/m     Estimated body mass index is 31.08 kg/m  as calculated from the following:    Height as of this encounter: 1.715 m (5' 7.5\").    Weight as of this encounter: 91.4 kg (201 lb 6.4 oz).    Physical Exam  GENERAL: alert and no distress  EYES: Eyes grossly normal to inspection  HENT: ear canals and TM's normal, nose and mouth without ulcers or lesions  NECK: no adenopathy, no asymmetry, masses, or scars  RESP: lungs clear to auscultation - no rales, rhonchi or wheezes  CV: regular rates and rhythm, normal S1 S2, no S3 or S4, and no murmur, click or rub  MS: no gross musculoskeletal defects noted, no edema  SKIN: no suspicious lesions or rashes  PSYCH: mentation appears normal, affect normal/bright        Signed Electronically by: Abraham Adkins,   "

## 2024-06-14 NOTE — PROGRESS NOTES
Lung Cancer Screening Shared Decision Making Visit     Brea Aquino, a 58 year old female, is eligible for lung cancer screening    History   Smoking Status    Every Day    Types: Cigarettes   Smokeless Tobacco    Never       I have discussed with patient the risks and benefits of screening for lung cancer with low-dose CT.     The risks include:    radiation exposure: one low dose chest CT has as much ionizing radiation as about 15 chest x-rays, or 6 months of background radiation living in [place]      false positives: most findings/nodules are NOT cancer, but some might still require additional diagnostic evaluation, including biopsy    over-diagnosis: some slow growing cancers that might never have been clinically significant will be detected and treated unnecessarily     The benefit of early detection of lung cancer is contingent upon adherence to annual screening or more frequent follow up if indicated.     Furthermore, to benefit from screening, Brea must be willing and able to undergo diagnostic procedures, if indicated. Although no specific guide is available for determining severity of comorbidities, it is reasonable to withhold screening in patients who have greater mortality risk from other diseases.     We did discuss that the best way to prevent lung cancer is to not smoke.    Some patients may value a numeric estimation of lung cancer risk when evaluating if lung cancer screening is right for them, here is one calculator:    ShouldIScreen

## 2024-06-15 LAB
ALBUMIN SERPL BCG-MCNC: 4.4 G/DL (ref 3.5–5.2)
ALP SERPL-CCNC: 65 U/L (ref 40–150)
ALT SERPL W P-5'-P-CCNC: 15 U/L (ref 0–50)
ANION GAP SERPL CALCULATED.3IONS-SCNC: 10 MMOL/L (ref 7–15)
AST SERPL W P-5'-P-CCNC: 18 U/L (ref 0–45)
BILIRUB DIRECT SERPL-MCNC: <0.2 MG/DL (ref 0–0.3)
BILIRUB SERPL-MCNC: 0.7 MG/DL
BUN SERPL-MCNC: 9.8 MG/DL (ref 6–20)
CALCIUM SERPL-MCNC: 9.7 MG/DL (ref 8.6–10)
CHLORIDE SERPL-SCNC: 108 MMOL/L (ref 98–107)
CHOLEST SERPL-MCNC: 170 MG/DL
CREAT SERPL-MCNC: 0.76 MG/DL (ref 0.51–0.95)
DEPRECATED HCO3 PLAS-SCNC: 23 MMOL/L (ref 22–29)
EGFRCR SERPLBLD CKD-EPI 2021: 90 ML/MIN/1.73M2
FASTING STATUS PATIENT QL REPORTED: YES
FASTING STATUS PATIENT QL REPORTED: YES
GLUCOSE SERPL-MCNC: 94 MG/DL (ref 70–99)
HDLC SERPL-MCNC: 46 MG/DL
LDLC SERPL CALC-MCNC: 111 MG/DL
NONHDLC SERPL-MCNC: 124 MG/DL
POTASSIUM SERPL-SCNC: 4.4 MMOL/L (ref 3.4–5.3)
PROT SERPL-MCNC: 6.9 G/DL (ref 6.4–8.3)
SODIUM SERPL-SCNC: 141 MMOL/L (ref 135–145)
TRIGL SERPL-MCNC: 66 MG/DL
TSH SERPL DL<=0.005 MIU/L-ACNC: 0.64 UIU/ML (ref 0.3–4.2)
VIT D+METAB SERPL-MCNC: 50 NG/ML (ref 20–50)

## 2024-08-27 ENCOUNTER — ANCILLARY PROCEDURE (OUTPATIENT)
Dept: MAMMOGRAPHY | Facility: CLINIC | Age: 59
End: 2024-08-27
Attending: FAMILY MEDICINE
Payer: COMMERCIAL

## 2024-08-27 DIAGNOSIS — Z12.31 ENCOUNTER FOR SCREENING MAMMOGRAM FOR BREAST CANCER: ICD-10-CM

## 2024-08-27 PROCEDURE — 77063 BREAST TOMOSYNTHESIS BI: CPT | Mod: TC | Performed by: RADIOLOGY

## 2024-08-27 PROCEDURE — 77067 SCR MAMMO BI INCL CAD: CPT | Mod: TC | Performed by: RADIOLOGY

## 2024-12-06 ENCOUNTER — HOSPITAL ENCOUNTER (OUTPATIENT)
Dept: CT IMAGING | Facility: CLINIC | Age: 59
Discharge: HOME | End: 2024-12-06
Attending: FAMILY MEDICINE
Payer: COMMERCIAL

## 2024-12-06 ENCOUNTER — HOSPITAL ENCOUNTER (OUTPATIENT)
Dept: ULTRASOUND IMAGING | Facility: CLINIC | Age: 59
Discharge: HOME | End: 2024-12-06
Attending: FAMILY MEDICINE
Payer: COMMERCIAL

## 2024-12-06 DIAGNOSIS — E04.1 THYROID NODULE: ICD-10-CM

## 2024-12-06 DIAGNOSIS — Z87.891 PERSONAL HISTORY OF TOBACCO USE: ICD-10-CM

## 2024-12-06 PROCEDURE — 76536 US EXAM OF HEAD AND NECK: CPT

## 2024-12-06 PROCEDURE — 71271 CT THORAX LUNG CANCER SCR C-: CPT

## 2024-12-12 DIAGNOSIS — E04.1 THYROID NODULE: Primary | ICD-10-CM

## 2024-12-18 ENCOUNTER — MYC MEDICAL ADVICE (OUTPATIENT)
Dept: FAMILY MEDICINE | Facility: CLINIC | Age: 59
End: 2024-12-18
Payer: COMMERCIAL

## 2024-12-31 ENCOUNTER — HOSPITAL ENCOUNTER (OUTPATIENT)
Dept: ULTRASOUND IMAGING | Facility: CLINIC | Age: 59
Discharge: HOME | End: 2024-12-31
Attending: FAMILY MEDICINE
Payer: COMMERCIAL

## 2024-12-31 DIAGNOSIS — E04.1 THYROID NODULE: ICD-10-CM

## 2024-12-31 PROCEDURE — 250N000009 HC RX 250: Performed by: RADIOLOGY

## 2024-12-31 PROCEDURE — 10005 FNA BX W/US GDN 1ST LES: CPT

## 2024-12-31 RX ADMIN — LIDOCAINE HYDROCHLORIDE 10 ML: 10 INJECTION, SOLUTION EPIDURAL; INFILTRATION; INTRACAUDAL; PERINEURAL at 13:35

## 2024-12-31 NOTE — PROGRESS NOTES
Left thyroid biopsy completed per Dr. Hernandez without difficulty, patient tolerated well. Discharged to home ambulatory with friend in stable condition.

## 2025-01-23 ENCOUNTER — ANCILLARY PROCEDURE (OUTPATIENT)
Dept: GENERAL RADIOLOGY | Facility: CLINIC | Age: 60
End: 2025-01-23
Attending: FAMILY MEDICINE
Payer: COMMERCIAL

## 2025-01-23 ENCOUNTER — OFFICE VISIT (OUTPATIENT)
Dept: FAMILY MEDICINE | Facility: CLINIC | Age: 60
End: 2025-01-23
Payer: COMMERCIAL

## 2025-01-23 VITALS
HEIGHT: 68 IN | OXYGEN SATURATION: 94 % | RESPIRATION RATE: 17 BRPM | BODY MASS INDEX: 28.19 KG/M2 | SYSTOLIC BLOOD PRESSURE: 126 MMHG | WEIGHT: 186 LBS | HEART RATE: 81 BPM | TEMPERATURE: 97.3 F | DIASTOLIC BLOOD PRESSURE: 78 MMHG

## 2025-01-23 DIAGNOSIS — F17.200 TOBACCO USE DISORDER: ICD-10-CM

## 2025-01-23 DIAGNOSIS — Z51.81 MEDICATION MONITORING ENCOUNTER: ICD-10-CM

## 2025-01-23 DIAGNOSIS — J06.9 UPPER RESPIRATORY TRACT INFECTION, UNSPECIFIED TYPE: ICD-10-CM

## 2025-01-23 DIAGNOSIS — J06.9 UPPER RESPIRATORY TRACT INFECTION, UNSPECIFIED TYPE: Primary | ICD-10-CM

## 2025-01-23 DIAGNOSIS — F31.81 BIPOLAR II DISORDER (H): ICD-10-CM

## 2025-01-23 LAB
BASOPHILS # BLD AUTO: 0 10E3/UL (ref 0–0.2)
BASOPHILS NFR BLD AUTO: 0 %
EOSINOPHIL # BLD AUTO: 0.1 10E3/UL (ref 0–0.7)
EOSINOPHIL NFR BLD AUTO: 2 %
ERYTHROCYTE [DISTWIDTH] IN BLOOD BY AUTOMATED COUNT: 12.6 % (ref 10–15)
HCT VFR BLD AUTO: 44.4 % (ref 35–47)
HGB BLD-MCNC: 15.2 G/DL (ref 11.7–15.7)
IMM GRANULOCYTES # BLD: 0 10E3/UL
IMM GRANULOCYTES NFR BLD: 0 %
LYMPHOCYTES # BLD AUTO: 1.9 10E3/UL (ref 0.8–5.3)
LYMPHOCYTES NFR BLD AUTO: 34 %
MCH RBC QN AUTO: 31.5 PG (ref 26.5–33)
MCHC RBC AUTO-ENTMCNC: 34.2 G/DL (ref 31.5–36.5)
MCV RBC AUTO: 92 FL (ref 78–100)
MONOCYTES # BLD AUTO: 0.4 10E3/UL (ref 0–1.3)
MONOCYTES NFR BLD AUTO: 8 %
NEUTROPHILS # BLD AUTO: 3 10E3/UL (ref 1.6–8.3)
NEUTROPHILS NFR BLD AUTO: 56 %
PLATELET # BLD AUTO: 266 10E3/UL (ref 150–450)
RBC # BLD AUTO: 4.83 10E6/UL (ref 3.8–5.2)
WBC # BLD AUTO: 5.4 10E3/UL (ref 4–11)

## 2025-01-23 PROCEDURE — 36415 COLL VENOUS BLD VENIPUNCTURE: CPT | Performed by: FAMILY MEDICINE

## 2025-01-23 PROCEDURE — 85025 COMPLETE CBC W/AUTO DIFF WBC: CPT | Performed by: FAMILY MEDICINE

## 2025-01-23 PROCEDURE — G2211 COMPLEX E/M VISIT ADD ON: HCPCS | Performed by: FAMILY MEDICINE

## 2025-01-23 PROCEDURE — 99214 OFFICE O/P EST MOD 30 MIN: CPT | Performed by: FAMILY MEDICINE

## 2025-01-23 RX ORDER — DEXTROMETHORPHAN HYDROBROMIDE AND PROMETHAZINE HYDROCHLORIDE 15; 6.25 MG/5ML; MG/5ML
5 SYRUP ORAL EVERY 4 HOURS PRN
Qty: 240 ML | Refills: 1 | Status: SHIPPED | OUTPATIENT
Start: 2025-01-23

## 2025-01-23 RX ORDER — AZITHROMYCIN 250 MG/1
TABLET, FILM COATED ORAL
Qty: 6 TABLET | Refills: 0 | Status: SHIPPED | OUTPATIENT
Start: 2025-01-23

## 2025-01-23 RX ORDER — LAMOTRIGINE 50 MG/1
TABLET, ORALLY DISINTEGRATING ORAL 2 TIMES DAILY
COMMUNITY

## 2025-01-23 ASSESSMENT — PATIENT HEALTH QUESTIONNAIRE - PHQ9
SUM OF ALL RESPONSES TO PHQ QUESTIONS 1-9: 0
SUM OF ALL RESPONSES TO PHQ QUESTIONS 1-9: 0
10. IF YOU CHECKED OFF ANY PROBLEMS, HOW DIFFICULT HAVE THESE PROBLEMS MADE IT FOR YOU TO DO YOUR WORK, TAKE CARE OF THINGS AT HOME, OR GET ALONG WITH OTHER PEOPLE: NOT DIFFICULT AT ALL

## 2025-01-23 ASSESSMENT — ENCOUNTER SYMPTOMS: COUGH: 1

## 2025-01-23 NOTE — PROGRESS NOTES
"  Assessment & Plan     Upper respiratory tract infection, unspecified type  15 days of productive cough, getting worse. Afebrile.  - REVIEW OF HEALTH MAINTENANCE PROTOCOL ORDERS  - CBC with platelets and differential  - XR Chest 2 Views  - CBC with platelets and differential  - azithromycin (ZITHROMAX) 250 MG tablet  Dispense: 6 tablet; Refill: 0  - promethazine-DM (PHENERGAN-DM) 6.25-15 MG/5ML syrup  Dispense: 240 mL; Refill: 1    Tobacco use disorder  Contemplating quitting. 42 pack year history.  - REVIEW OF HEALTH MAINTENANCE PROTOCOL ORDERS    Medication monitoring encounter  - REVIEW OF HEALTH MAINTENANCE PROTOCOL ORDERS    Discussed reducing cigarette use.     Plan:    1) Medications: reviewed    2) Labs: CBC    3) Imaging/Diagnostics: CXR    4) azithromycin and promethazine     5) follow up if symptoms don't improve by Tuesday 1/28      No follow-ups on file.    *** minutes spent by myself on the date of the encounter doing chart review, history and exam, documentation and further activities per the note.    The longitudinal plan of care for the diagnosis(es)/condition(s) as documented were addressed during this visit. Due to the added complexity in care, I will continue to support Pat in the subsequent management and with ongoing continuity of care.        BMI  Estimated body mass index is 28.7 kg/m  as calculated from the following:    Height as of this encounter: 1.715 m (5' 7.5\").    Weight as of this encounter: 84.4 kg (186 lb).   Weight management plan: Discussed healthy diet and exercise guidelines      Michelle Guidry is a 59 year old, presenting for the following health issues:  Cough (Progressed to deep cough and mucus for 15 days)  15 days of congestion, headaches in am, productive cough (doesn't cough it out) that is worsening, and rhinitis  No fever/chills, post nasal drip, sore throat  Clear to yellow mucous from nose  Theraflu 3x/day daily  Ibuprofen when not taking theraflu  Smokes 1 " ppd        2025    11:02 AM   Additional Questions   Roomed by Neyda LEWIS MA     History of Present Illness       Reason for visit:  A cold that has gotten worse and lasted for 15 days  Symptom onset:  3-4 weeks ago  Symptoms include:  Deep cough and thick snot  Symptom intensity:  Severe  Symptom progression:  Worsening  Had these symptoms before:  No   She is taking medications regularly.       Patient Active Problem List   Diagnosis    Tobacco use disorder    Endometriosis    Other congenital anomaly of uterus    MEDICAL HISTORY OF -    Mild recurrent major depression    Hyperlipidemia LDL goal <130    Colon polyps    Tension-type headaches    Upper back pain    Cervicalgia    Anxiety    Cellulitis    Gastroesophageal reflux disease without esophagitis    Bipolar II disorder (H) - Followed by Psych CNP Demetrice Gonzalez at [facility]       Past Medical History:   Diagnosis Date    Depressive disorder     Bipolar 2    Endometriosis, site unspecified 10/4/2007    MEDICAL HISTORY OF -     2 Cervix    Other and unspecified hyperlipidemia 2006    RECURR DEPRESSION-FULL REM 10/25/2006    Tobacco use disorder 2006       Past Surgical History:   Procedure Laterality Date    C ANALGESIA,EPIDURAL,LABOR &       COLONOSCOPY  2014    Dr. Estrada [facility]    COLONOSCOPY      [place] [facility]    ESOPHAGOSCOPY, GASTROSCOPY, DUODENOSCOPY (EGD), COMBINED  2014    Procedure: COMBINED ESOPHAGOSCOPY, GASTROSCOPY, DUODENOSCOPY (EGD);  Colonoscopy, & EGD with polypectomy at sigmoid colon using hot snare;  Surgeon: Floyd Estrada MD;  Location: [facility]    HYSTERECTOMY, PAP STILL INDICATED  2007    Laparoscopic Supracervical, benign path    LAPAROSCOPY PROCEDURE UNLISTED      For Endometriosis    SURGICAL HISTORY OF -   2008    Incision of 2 infected cysts-axilla, and abdomen    SURGICAL HISTORY OF -   2010    Removal of Epidermoid Cyst in Rt. Axillary area     THYROID BIOPSY N/A 12/31/2024    TUBAL LIGATION         Current Outpatient Medications   Medication Sig Dispense Refill    azithromycin (ZITHROMAX) 250 MG tablet 2 tabs day 1 and the 1 tab daily for 4 more days 6 tablet 0    bimatoprost (LUMIGAN) 0.03 % ophthalmic solution PLACE 1 DROP INTO BOTH EYES EVERY NIGHT AT BEDTIME FOR 2 MONTHS      BUSPIRONE HCL PO Take 15 mg by mouth 2 times daily      hydrOXYzine (ATARAX) 10 MG tablet Take 10 mg by mouth 3 times daily as needed for anxiety       lamoTRIgine (LAMICTAL) 50 MG TBDP ODT tab Take by mouth 2 times daily. Patient reported- unsure on dose      omeprazole (PRILOSEC) 40 MG DR capsule TAKE 1 CAPSULE BY MOUTH ONCE DAILY 3O TO 60 MINUTES BEFORE A MEAL 90 capsule 2    promethazine-DM (PHENERGAN-DM) 6.25-15 MG/5ML syrup Take 5 mLs by mouth every 4 hours as needed. 240 mL 1    simvastatin (ZOCOR) 40 MG tablet TAKE 1 TABLET(40 MG) BY MOUTH AT BEDTIME 90 tablet 3    brinzolamide (AZOPT) 1 % ophthalmic suspension  (Patient not taking: Reported on 1/23/2025)      nicotine (NICORETTE) 4 MG lozenge How to take it: When the urge to smoke occurs, suck (do not chew) on 1 lozenge to release nicotine. Do not eat or drink while the lozenge is in your mouth. Follow this schedule: Weeks 1 to 6: One lozenge every 1 to 2 hours. Use at least 9 lozenges a day, but no more than 20. Weeks 7 to 9: One lozenge every 2 to 4 hours. Weeks 10 to 12: One lozenge every 4 to 8 hours (Patient not taking: Reported on 1/23/2025) 108 lozenge 5       Allergies   Allergen Reactions    Adhesive Tape     Seasonal Allergies        Family History   Problem Relation Age of Onset    Hyperlipidemia Mother     Other Cancer Mother         Melanoma    Anesthesia Reaction Mother     Cancer Father         skin cancer    Substance Abuse Father     Diabetes Sister     Anesthesia Reaction Sister     Hyperlipidemia Sister     Anxiety Disorder Sister     Diabetes Maternal Grandmother     Asthma Maternal Grandmother      AYLIN Maternal Grandfather         MI at 88    Cancer - colorectal Maternal Grandfather     Breast Cancer Maternal Grandfather     Colon Cancer Maternal Grandfather     Substance Abuse Maternal Grandfather     Other Cancer Paternal Grandmother     Anxiety Disorder Daughter     Breast Cancer Cousin     Breast Cancer Cousin        Social History     Socioeconomic History    Marital status:      Spouse name: None    Number of children: None    Years of education: None    Highest education level: None   Tobacco Use    Smoking status: Every Day     Current packs/day: 1.00     Average packs/day: 1 pack/day for 42.0 years (42.0 ttl pk-yrs)     Types: Cigarettes    Smokeless tobacco: Never   Vaping Use    Vaping status: Never Used   Substance and Sexual Activity    Alcohol use: Yes     Alcohol/week: 6.0 standard drinks of alcohol     Types: 6 Shots of liquor per week     Comment: 1-2 drinks a couple times per week. Sometimes more on weekends    Drug use: No    Sexual activity: Not Currently     Partners: Male     Birth control/protection: Female Surgical   Other Topics Concern    Sleep Concern No    Stress Concern Yes    Exercise No    Seat Belt Yes    Parent/sibling w/ CABG, MI or angioplasty before 65F 55M? No   Social History Narrative    Works at [employer]     Social Drivers of Health     Financial Resource Strain: Low Risk  (6/11/2024)    Financial Resource Strain     Within the past 12 months, have you or your family members you live with been unable to get utilities (heat, electricity) when it was really needed?: No   Food Insecurity: Low Risk  (6/11/2024)    Food Insecurity     Within the past 12 months, did you worry that your food would run out before you got money to buy more?: No     Within the past 12 months, did the food you bought just not last and you didn t have money to get more?: No   Transportation Needs: Low Risk  (6/11/2024)    Transportation Needs     Within the past 12 months, has lack of  "transportation kept you from medical appointments, getting your medicines, non-medical meetings or appointments, work, or from getting things that you need?: No   Physical Activity: Inactive (6/11/2024)    Exercise Vital Sign     Days of Exercise per Week: 0 days     Minutes of Exercise per Session: 0 min   Stress: No Stress Concern Present (6/11/2024)    [employer] [place] [employer] - Occupational Stress Questionnaire     Feeling of Stress : Not at all   Social Connections: Unknown (6/11/2024)    Social Connection and Isolation Panel [NHANES]     Frequency of Social Gatherings with Friends and Family: Once a week   Interpersonal Safety: Low Risk  (1/23/2025)    Interpersonal Safety     Do you feel physically and emotionally safe where you currently live?: Yes     Within the past 12 months, have you been hit, slapped, kicked or otherwise physically hurt by someone?: No     Within the past 12 months, have you been humiliated or emotionally abused in other ways by your partner or ex-partner?: No   Housing Stability: Low Risk  (6/11/2024)    Housing Stability     Do you have housing? : Yes     Are you worried about losing your housing?: No       Review of Systems  CONSTITUTIONAL: NEGATIVE for fever, chills, change in weight  INTEGUMENTARY/SKIN: NEGATIVE for worrisome rashes, moles or lesions  EYES: NEGATIVE for vision changes or irritation  ENT/MOUTH: NEGATIVE for ear, mouth and throat problems  RESP: NEGATIVE for SHORTNESS OF BREATH, POSITIVE for productive cough  CV: NEGATIVE for chest pain, palpitations or peripheral edema  GI: NEGATIVE for nausea, abdominal pain, heartburn, or change in bowel habits  : NEGATIVE for dysuria, or hematuria  MUSCULOSKELETAL: NEGATIVE for significant arthralgias or myalgia  NEURO: NEGATIVE for weakness, dizziness        Objective    /78   Pulse 81   Temp 97.3  F (36.3  C) (Tympanic)   Resp 17   Ht 1.715 m (5' 7.5\")   Wt 84.4 kg (186 lb)   LMP 11/22/2006   SpO2 " "94%   BMI 28.70 kg/m    Body mass index is 28.7 kg/m .    Physical Exam   GENERAL APPEARANCE: healthy, alert and no distress  EYES: Eyes grossly normal to inspection and conjunctivae and sclerae normal  HENT: ear canals and TM's normal, nose and mouth without ulcers or lesions, oropharynx clear and oral mucous membranes moist  NECK: no adenopathy, no asymmetry, masses, or scars and thyroid normal to palpation  RESP: lungs clear to auscultation - no rales, rhonchi or wheezes, cough with deep breath  CV: regular rate and rhythm, normal S1 S2, no S3 or S4, no murmur, click or rub, no peripheral edema   ABDOMEN: soft, nontender, no hepatosplenomegaly, no masses   MS: no musculoskeletal defects are noted and gait is age appropriate without ataxia  SKIN: no suspicious lesions or rashes  NEURO: Normal tone, sensory exam grossly normal, mentation intact and speech normal  PSYCH: mentation appears normal and affect normal/bright  LYMPHATICS: ant. cervical- normal, post. cervical- normal, supraclavicular- normal    CXR 1/23  IMPRESSION: Normal cardiomediastinal silhouette. Subtle airspace opacities are seen in the lower lungs, likely reflecting subsegmental atelectasis versus atypical infection. Vascular calcifications are seen within the thoracic aorta. Multilevel   degenerative changes are seen in the spine.    Lab Results   Component Value Date    WBC 5.4 01/23/2025    WBC 7.0 10/23/2017     Lab Results   Component Value Date    RBC 4.83 01/23/2025    RBC 4.47 10/23/2017     Lab Results   Component Value Date    HGB 15.2 01/23/2025    HGB 14.6 10/23/2017     Lab Results   Component Value Date    HCT 44.4 01/23/2025    HCT 43.2 10/23/2017     No components found for: \"MCT\"  Lab Results   Component Value Date    MCV 92 01/23/2025    MCV 97 10/23/2017     Lab Results   Component Value Date    MCH 31.5 01/23/2025    MCH 32.7 10/23/2017     Lab Results   Component Value Date    MCHC 34.2 01/23/2025    MCHC 33.8 10/23/2017 "     Lab Results   Component Value Date    RDW 12.6 01/23/2025    RDW 13.2 10/23/2017     Lab Results   Component Value Date     01/23/2025     10/23/2017           Cece Lau MS3,  has participated in the care of this patient.     Provider Disclosure:  I agree with above History, Review of Systems, Physical exam and Plan.  I have reviewed the content of the documentation and have edited it as needed. I have personally performed the services documented here and the documentation accurately represents those services and the decisions I have made.      Electronically signed by:             Yves Rosario MD, FAAFP     [facility]  [address] [place] [id number]  [email or web address]  [email or web address]   Office: [phone]  Fax: [phone]     {Email feedback regarding this note to [email or web address]   :[id number]}

## 2025-02-20 DIAGNOSIS — K21.9 GASTROESOPHAGEAL REFLUX DISEASE WITHOUT ESOPHAGITIS: ICD-10-CM

## 2025-02-20 RX ORDER — OMEPRAZOLE 40 MG/1
CAPSULE, DELAYED RELEASE ORAL
Qty: 90 CAPSULE | Refills: 2 | Status: SHIPPED | OUTPATIENT
Start: 2025-02-20

## 2025-05-09 ENCOUNTER — RESULTS FOLLOW-UP (OUTPATIENT)
Dept: URGENT CARE | Facility: URGENT CARE | Age: 60
End: 2025-05-09

## 2025-05-09 ENCOUNTER — OFFICE VISIT (OUTPATIENT)
Dept: URGENT CARE | Facility: URGENT CARE | Age: 60
End: 2025-05-09
Payer: COMMERCIAL

## 2025-05-09 VITALS
TEMPERATURE: 98.4 F | HEART RATE: 80 BPM | BODY MASS INDEX: 29.19 KG/M2 | OXYGEN SATURATION: 98 % | SYSTOLIC BLOOD PRESSURE: 118 MMHG | DIASTOLIC BLOOD PRESSURE: 80 MMHG | HEIGHT: 67 IN | WEIGHT: 186 LBS

## 2025-05-09 DIAGNOSIS — R20.2 RIGHT LEG PARESTHESIAS: Primary | ICD-10-CM

## 2025-05-09 DIAGNOSIS — M79.89 SWELLING OF LOWER LEG: ICD-10-CM

## 2025-05-09 LAB
ANION GAP SERPL CALCULATED.3IONS-SCNC: 7 MMOL/L (ref 3–14)
BASOPHILS # BLD AUTO: 0 10E3/UL (ref 0–0.2)
BASOPHILS NFR BLD AUTO: 1 %
BUN SERPL-MCNC: 10 MG/DL (ref 7–30)
CALCIUM SERPL-MCNC: 9.9 MG/DL (ref 8.5–10.1)
CHLORIDE BLD-SCNC: 106 MMOL/L (ref 94–109)
CO2 SERPL-SCNC: 28 MMOL/L (ref 20–32)
CREAT SERPL-MCNC: 0.9 MG/DL (ref 0.52–1.04)
D DIMER PPP FEU-MCNC: <0.27 UG/ML FEU (ref 0–0.5)
EGFRCR SERPLBLD CKD-EPI 2021: 73 ML/MIN/1.73M2
EOSINOPHIL # BLD AUTO: 0.2 10E3/UL (ref 0–0.7)
EOSINOPHIL NFR BLD AUTO: 3 %
ERYTHROCYTE [DISTWIDTH] IN BLOOD BY AUTOMATED COUNT: 12.7 % (ref 10–15)
GLUCOSE BLD-MCNC: 93 MG/DL (ref 70–99)
HCT VFR BLD AUTO: 47.5 % (ref 35–47)
HGB BLD-MCNC: 15.9 G/DL (ref 11.7–15.7)
IMM GRANULOCYTES # BLD: 0 10E3/UL
IMM GRANULOCYTES NFR BLD: 0 %
LYMPHOCYTES # BLD AUTO: 2.3 10E3/UL (ref 0.8–5.3)
LYMPHOCYTES NFR BLD AUTO: 35 %
MCH RBC QN AUTO: 31.3 PG (ref 26.5–33)
MCHC RBC AUTO-ENTMCNC: 33.5 G/DL (ref 31.5–36.5)
MCV RBC AUTO: 94 FL (ref 78–100)
MONOCYTES # BLD AUTO: 0.5 10E3/UL (ref 0–1.3)
MONOCYTES NFR BLD AUTO: 7 %
NEUTROPHILS # BLD AUTO: 3.6 10E3/UL (ref 1.6–8.3)
NEUTROPHILS NFR BLD AUTO: 55 %
PLATELET # BLD AUTO: 297 10E3/UL (ref 150–450)
POTASSIUM BLD-SCNC: 4.9 MMOL/L (ref 3.4–5.3)
RBC # BLD AUTO: 5.08 10E6/UL (ref 3.8–5.2)
SODIUM SERPL-SCNC: 141 MMOL/L (ref 135–145)
WBC # BLD AUTO: 6.6 10E3/UL (ref 4–11)

## 2025-05-09 PROCEDURE — 80048 BASIC METABOLIC PNL TOTAL CA: CPT | Performed by: PHYSICIAN ASSISTANT

## 2025-05-09 PROCEDURE — 85379 FIBRIN DEGRADATION QUANT: CPT | Performed by: PHYSICIAN ASSISTANT

## 2025-05-09 PROCEDURE — 3074F SYST BP LT 130 MM HG: CPT | Performed by: PHYSICIAN ASSISTANT

## 2025-05-09 PROCEDURE — 36415 COLL VENOUS BLD VENIPUNCTURE: CPT | Performed by: PHYSICIAN ASSISTANT

## 2025-05-09 PROCEDURE — 85025 COMPLETE CBC W/AUTO DIFF WBC: CPT | Performed by: PHYSICIAN ASSISTANT

## 2025-05-09 PROCEDURE — 99214 OFFICE O/P EST MOD 30 MIN: CPT | Performed by: PHYSICIAN ASSISTANT

## 2025-05-09 PROCEDURE — 3079F DIAST BP 80-89 MM HG: CPT | Performed by: PHYSICIAN ASSISTANT

## 2025-05-09 RX ORDER — MULTIVIT-MIN/IRON/FOLIC ACID/K 18-600-40
CAPSULE ORAL
COMMUNITY

## 2025-05-09 ASSESSMENT — ENCOUNTER SYMPTOMS
NUMBNESS: 1
WEAKNESS: 0
BACK PAIN: 0
COLOR CHANGE: 0
FEVER: 0
JOINT SWELLING: 0

## 2025-05-09 NOTE — PROGRESS NOTES
Assessment & Plan:        ICD-10-CM    1. Right leg paresthesias  R20.2 CBC with platelets and differential     Basic metabolic panel  (Ca, Cl, CO2, Creat, Gluc, K, Na, BUN)     D dimer, quantitative     CBC with platelets and differential     Basic metabolic panel  (Ca, Cl, CO2, Creat, Gluc, K, Na, BUN)     D dimer, quantitative      2. Swelling of lower leg  M79.89 CBC with platelets and differential     Basic metabolic panel  (Ca, Cl, CO2, Creat, Gluc, K, Na, BUN)     D dimer, quantitative     CBC with platelets and differential     Basic metabolic panel  (Ca, Cl, CO2, Creat, Gluc, K, Na, BUN)     D dimer, quantitative            Plan/Clinical Decision Making:    Patient presents with tingling, numb sensation right medial lower leg. Patient states she has noticed swelling of calf. Measurement of right compared to left calf about 1/2 inch in size. No edema. No calf tenderness. Decreased sensation of right lower leg. Normal strength bilateral LE. No injury or trauma, no fever. Smoker. No hx of clots.   BMP and CBC wnl.     Discussed unsure of exact cause. Discussed monitor symptoms. Possible musculoskeletal issue. D-dimer done and negative.   Follow-up with primary if persisting and not improving on own.       Return if symptoms worsen or fail to improve, for in 5-7 days.     At the end of the encounter, I discussed results, diagnosis, medications. Discussed red flags for immediate return to clinic/ER, as well as indications for follow up if no improvement. Patient understood and agreed to plan. Patient was stable for discharge.        Maddie Feng PA-C on 5/9/2025 at 4:53 PM          Subjective:     HPI:    Brea is a 59 year old female who presents to clinic today for the following health issues:  Chief Complaint   Patient presents with    Urgent Care     Numbness, swelling in right thigh- noticed it today when shaving her legs,ankle is bothering her a little. Was triaged by nurse today.     HPI    Several  "hours ago when shaving legs felt numb area right lower medial aspect. No rashes. No fever. No injury or trauma. No back pain. Hasn't had before. Didn't notice until she was shaving. Now feeling tingling.     Smoker: 1 pack per day.   No  hx of blood clots.     Nurse triage.   S-(situation): Numbness/swelling in inside of R calf.     B-(background): Noticed today. No known injury. Denies any hx of blood clots or other diseases. Did have a levy horse/cramp in the same area last evening. No known injury. Possible \"slight redness by ankle\".     A-(assessment): Swollen and numbness to R inside of calf. Denies any other symptoms. Denies any discoloration in foot or toes.     R-(recommendations): Be seen in UC. Expressed understanding and acceptance of the plan. Had no further questions at this time.  Advised can call back to clinic at any time with concerns.     Review of Systems   Constitutional:  Negative for fever.   Musculoskeletal:  Negative for back pain, gait problem and joint swelling.   Skin:  Negative for color change and rash.   Neurological:  Positive for numbness. Negative for weakness.         Patient Active Problem List   Diagnosis    Tobacco use disorder    Endometriosis    Other congenital anomaly of uterus    MEDICAL HISTORY OF -    Mild recurrent major depression    Hyperlipidemia LDL goal <130    Colon polyps    Tension-type headaches    Upper back pain    Cervicalgia    Anxiety    Cellulitis    Gastroesophageal reflux disease without esophagitis    Bipolar II disorder (H) - Followed by Psych CNP Demetrice Gonzalez at [facility]        Past Medical History:   Diagnosis Date    Depressive disorder     Bipolar 2    Endometriosis, site unspecified 10/4/2007    MEDICAL HISTORY OF -     2 Cervix    Other and unspecified hyperlipidemia 12/6/2006    RECURR DEPRESSION-FULL REM 10/25/2006    Tobacco use disorder 11/27/2006       Social History     Tobacco Use    Smoking status: Every Day     Current " "packs/day: 1.00     Average packs/day: 1 pack/day for 42.0 years (42.0 ttl pk-yrs)     Types: Cigarettes    Smokeless tobacco: Never   Substance Use Topics    Alcohol use: Yes     Alcohol/week: 6.0 standard drinks of alcohol     Types: 6 Shots of liquor per week     Comment: 1-2 drinks a couple times per week. Sometimes more on weekends             Objective:     Vitals:    05/09/25 1649   BP: 118/80   Pulse: 80   Temp: 98.4  F (36.9  C)   SpO2: 98%   Weight: 84.4 kg (186 lb)   Height: 1.702 m (5' 7\")         Physical Exam   EXAM:   Pleasant, alert, appropriate appearance. NAD.  Head Exam: Normocephalic, atraumatic.  Eye Exam:   non icteric/injection.    Nose Exam: Normal external nose.    OroPharynx Exam:  Moist mucous membranes. No erythema, pharynx without exudate or hypertrophy.  Neck/Thyroid Exam:  No LAD.    Chest/Respiratory Exam: CTAB.  Cardiovascular Exam: RRR. No murmur or rubs.  ABD: soft, Non-tender, normal bowel sounds, no rebound/guarding.  No masses/organomegaly.  Ext/musculoskeletal: Grossly intact, No edema, no calf tenderness. FROM of LE, normal strength.   Neuro: right medial calf with decreased sensation right medial calf  Skin: no rash or lesion.      Results:  Results for orders placed or performed in visit on 05/09/25   Basic metabolic panel  (Ca, Cl, CO2, Creat, Gluc, K, Na, BUN)     Status: Normal   Result Value Ref Range    Sodium 141 135 - 145 mmol/L    Potassium 4.9 3.4 - 5.3 mmol/L    Chloride 106 94 - 109 mmol/L    Carbon Dioxide (CO2) 28 20 - 32 mmol/L    Anion Gap 7 3 - 14 mmol/L    Urea Nitrogen 10 7 - 30 mg/dL    Creatinine 0.90 0.52 - 1.04 mg/dL    GFR Estimate 73 >60 mL/min/1.73m2    Calcium 9.9 8.5 - 10.1 mg/dL    Glucose 93 70 - 99 mg/dL   D dimer, quantitative     Status: Normal   Result Value Ref Range    D-Dimer Quantitative <0.27 0.00 - 0.50 ug/mL FEU    Narrative    This D-dimer assay is intended for use in conjunction with a clinical pretest probability assessment model to " exclude pulmonary embolism (PE) and deep venous thrombosis (DVT) in outpatients suspected of PE or DVT. The cut-off value is 0.50 ug/mL FEU.    For patients 50 years of age or older, the application of age-adjusted cut-off values for D-Dimer may increase the specificity without significant effect on sensitivity. The literature suggested calculation age adjusted cut-off in ug/L = age in years x 10 ug/L. The results in this laboratory are reported as ug/mL rather than ug/L. The calculation for age adjusted cut off in ug/mL= age in years x 0.01 ug/mL. For example, the cut off for a 76 year old male is 76 x 0.01 ug/mL = 0.76 ug/mL (760 ug/L).    M Silverio et al. Age adjusted D-dimer cut-off levels to rule out pulmonary embolism: The ADJUST-PE Study. KETAN 2014;311:5784-0640.; HJ Facundo et al. Diagnostic accuracy of conventional or age adjusted D-dimer cutoff values in older patients with suspected venous thromboembolism. Systemic review and meta-analysis. BMJ 2013:346:f2492.   CBC with platelets and differential     Status: Abnormal   Result Value Ref Range    WBC Count 6.6 4.0 - 11.0 10e3/uL    RBC Count 5.08 3.80 - 5.20 10e6/uL    Hemoglobin 15.9 (H) 11.7 - 15.7 g/dL    Hematocrit 47.5 (H) 35.0 - 47.0 %    MCV 94 78 - 100 fL    MCH 31.3 26.5 - 33.0 pg    MCHC 33.5 31.5 - 36.5 g/dL    RDW 12.7 10.0 - 15.0 %    Platelet Count 297 150 - 450 10e3/uL    % Neutrophils 55 %    % Lymphocytes 35 %    % Monocytes 7 %    % Eosinophils 3 %    % Basophils 1 %    % Immature Granulocytes 0 %    Absolute Neutrophils 3.6 1.6 - 8.3 10e3/uL    Absolute Lymphocytes 2.3 0.8 - 5.3 10e3/uL    Absolute Monocytes 0.5 0.0 - 1.3 10e3/uL    Absolute Eosinophils 0.2 0.0 - 0.7 10e3/uL    Absolute Basophils 0.0 0.0 - 0.2 10e3/uL    Absolute Immature Granulocytes 0.0 <=0.4 10e3/uL   CBC with platelets and differential     Status: Abnormal    Narrative    The following orders were created for panel order CBC with platelets and  differential.  Procedure                               Abnormality         Status                     ---------                               -----------         ------                     CBC with platelets and ...[[id number]]  Abnormal            Final result                 Please view results for these tests on the individual orders.

## 2025-05-09 NOTE — PROGRESS NOTES
Urgent Care Clinic Visit    Chief Complaint   Patient presents with    Urgent Care     Numbness, swelling in right thigh- noticed it today when shaving her legs,ankle is bothering her a little. Was triaged by nurse today.               5/9/2025     4:51 PM   Additional Questions   Roomed by Lindsey TATUM

## 2025-05-15 ENCOUNTER — PATIENT OUTREACH (OUTPATIENT)
Dept: CARE COORDINATION | Facility: CLINIC | Age: 60
End: 2025-05-15
Payer: COMMERCIAL

## 2025-05-29 ENCOUNTER — PATIENT OUTREACH (OUTPATIENT)
Dept: CARE COORDINATION | Facility: CLINIC | Age: 60
End: 2025-05-29
Payer: COMMERCIAL

## 2025-06-24 ENCOUNTER — PATIENT OUTREACH (OUTPATIENT)
Dept: CARE COORDINATION | Facility: CLINIC | Age: 60
End: 2025-06-24
Payer: COMMERCIAL

## 2025-07-07 SDOH — HEALTH STABILITY: PHYSICAL HEALTH: ON AVERAGE, HOW MANY MINUTES DO YOU ENGAGE IN EXERCISE AT THIS LEVEL?: 10 MIN

## 2025-07-07 SDOH — HEALTH STABILITY: PHYSICAL HEALTH: ON AVERAGE, HOW MANY DAYS PER WEEK DO YOU ENGAGE IN MODERATE TO STRENUOUS EXERCISE (LIKE A BRISK WALK)?: 0 DAYS

## 2025-07-07 ASSESSMENT — SOCIAL DETERMINANTS OF HEALTH (SDOH): HOW OFTEN DO YOU GET TOGETHER WITH FRIENDS OR RELATIVES?: ONCE A WEEK

## 2025-07-10 ENCOUNTER — OFFICE VISIT (OUTPATIENT)
Dept: FAMILY MEDICINE | Facility: CLINIC | Age: 60
End: 2025-07-10
Attending: FAMILY MEDICINE
Payer: COMMERCIAL

## 2025-07-10 VITALS
RESPIRATION RATE: 12 BRPM | SYSTOLIC BLOOD PRESSURE: 114 MMHG | HEART RATE: 74 BPM | TEMPERATURE: 98.8 F | OXYGEN SATURATION: 96 % | BODY MASS INDEX: 29.19 KG/M2 | DIASTOLIC BLOOD PRESSURE: 74 MMHG | WEIGHT: 186 LBS | HEIGHT: 67 IN

## 2025-07-10 DIAGNOSIS — E55.9 VITAMIN D DEFICIENCY: ICD-10-CM

## 2025-07-10 DIAGNOSIS — Z00.00 ROUTINE GENERAL MEDICAL EXAMINATION AT A HEALTH CARE FACILITY: Primary | ICD-10-CM

## 2025-07-10 DIAGNOSIS — E78.5 HYPERLIPIDEMIA LDL GOAL <130: ICD-10-CM

## 2025-07-10 DIAGNOSIS — Z13.1 SCREENING FOR DIABETES MELLITUS: ICD-10-CM

## 2025-07-10 DIAGNOSIS — Z87.891 PERSONAL HISTORY OF TOBACCO USE: ICD-10-CM

## 2025-07-10 DIAGNOSIS — E04.1 THYROID NODULE: ICD-10-CM

## 2025-07-10 DIAGNOSIS — Z12.31 ENCOUNTER FOR SCREENING MAMMOGRAM FOR BREAST CANCER: ICD-10-CM

## 2025-07-10 DIAGNOSIS — Z12.11 SCREEN FOR COLON CANCER: ICD-10-CM

## 2025-07-10 DIAGNOSIS — Z13.0 SCREENING FOR DEFICIENCY ANEMIA: ICD-10-CM

## 2025-07-10 PROCEDURE — G2211 COMPLEX E/M VISIT ADD ON: HCPCS | Performed by: FAMILY MEDICINE

## 2025-07-10 PROCEDURE — 1126F AMNT PAIN NOTED NONE PRSNT: CPT | Performed by: FAMILY MEDICINE

## 2025-07-10 PROCEDURE — 99213 OFFICE O/P EST LOW 20 MIN: CPT | Mod: 25 | Performed by: FAMILY MEDICINE

## 2025-07-10 PROCEDURE — 3078F DIAST BP <80 MM HG: CPT | Performed by: FAMILY MEDICINE

## 2025-07-10 PROCEDURE — 3044F HG A1C LEVEL LT 7.0%: CPT | Performed by: FAMILY MEDICINE

## 2025-07-10 PROCEDURE — 99396 PREV VISIT EST AGE 40-64: CPT | Performed by: FAMILY MEDICINE

## 2025-07-10 PROCEDURE — 3049F LDL-C 100-129 MG/DL: CPT | Performed by: FAMILY MEDICINE

## 2025-07-10 PROCEDURE — G0296 VISIT TO DETERM LDCT ELIG: HCPCS | Performed by: FAMILY MEDICINE

## 2025-07-10 PROCEDURE — 3074F SYST BP LT 130 MM HG: CPT | Performed by: FAMILY MEDICINE

## 2025-07-10 RX ORDER — SIMVASTATIN 40 MG
40 TABLET ORAL AT BEDTIME
Qty: 90 TABLET | Refills: 3 | Status: SHIPPED | OUTPATIENT
Start: 2025-07-10

## 2025-07-10 ASSESSMENT — ANXIETY QUESTIONNAIRES
6. BECOMING EASILY ANNOYED OR IRRITABLE: NOT AT ALL
5. BEING SO RESTLESS THAT IT IS HARD TO SIT STILL: NOT AT ALL
IF YOU CHECKED OFF ANY PROBLEMS ON THIS QUESTIONNAIRE, HOW DIFFICULT HAVE THESE PROBLEMS MADE IT FOR YOU TO DO YOUR WORK, TAKE CARE OF THINGS AT HOME, OR GET ALONG WITH OTHER PEOPLE: NOT DIFFICULT AT ALL
GAD7 TOTAL SCORE: 0
GAD7 TOTAL SCORE: 0
1. FEELING NERVOUS, ANXIOUS, OR ON EDGE: NOT AT ALL
3. WORRYING TOO MUCH ABOUT DIFFERENT THINGS: NOT AT ALL
7. FEELING AFRAID AS IF SOMETHING AWFUL MIGHT HAPPEN: NOT AT ALL
2. NOT BEING ABLE TO STOP OR CONTROL WORRYING: NOT AT ALL

## 2025-07-10 ASSESSMENT — PAIN SCALES - GENERAL: PAINLEVEL_OUTOF10: NO PAIN (0)

## 2025-07-10 ASSESSMENT — PATIENT HEALTH QUESTIONNAIRE - PHQ9
5. POOR APPETITE OR OVEREATING: NOT AT ALL
SUM OF ALL RESPONSES TO PHQ QUESTIONS 1-9: 0

## 2025-07-10 NOTE — PATIENT INSTRUCTIONS
Lung Cancer Screening   Frequently Asked Questions  If you are at high-risk for lung cancer, getting screened with low-dose computed tomography (LDCT) every year can help save your life. This handout offers answers to some of the most common questions about lung cancer screening. If you have other questions, please call [phone] ([phone]).     What is it?  Lung cancer screening uses special X-ray technology to create an image of your lung tissue. The exam is quick and easy and takes less than 10 seconds. We don t give you any medicine or use any needles. You can eat before and after the exam. You don t need to change your clothes as long as the clothing on your chest doesn t contain metal. But, you do need to be able to hold your breath for at least 6 seconds during the exam.    What is the goal of lung cancer screening?  The goal of lung cancer screening is to save lives. Many times, lung cancer is not found until a person starts having physical symptoms. Lung cancer screening can help detect lung cancer in the earliest stages when it may be easier to treat.    Who should be screened for lung cancer?  We suggest lung cancer screening for anyone who is at high-risk for lung cancer. You are in the high-risk group if you:      are between the ages of 55 and 79, and    have smoked at least 1 pack of cigarettes a day for 20 or more years, and    still smoke or have quit within the past 15 years.    However, if you have a new cough or shortness of breath, you should talk to your doctor before being screened.    Why does it matter if I have symptoms?  Certain symptoms can be a sign that you have a condition in your lungs that should be checked and treated by your doctor. These symptoms include fever, chest pain, a new or changing cough, shortness of breath that you have never felt before, coughing up blood or unexplained weight loss. Having any of these symptoms can greatly affect the results of lung  cancer screening.       Should all smokers get an LDCT lung cancer screening exam?  It depends. Lung cancer screening is for a very specific group of men and women who have a history of heavy smoking over a long period of time (see  Who should be screened for lung cancer  above).  I am in the high-risk group, but have been diagnosed with cancer in the past. Is LDCT lung cancer screening right for me?  In some cases, you should not have LDCT lung screening, such as when your doctor is already following your cancer with CT scan studies. Your doctor will help you decide if LDCT lung screening is right for you.  Do I need to have a screening exam every year?  Yes. If you are in the high-risk group described earlier, you should get an LDCT lung cancer screening exam every year until you are 79, or are no longer willing or able to undergo screening and possible procedures to diagnose and treat lung cancer.  How effective is LDCT at preventing death from lung cancer?  Studies have shown that LDCT lung cancer screening can lower the risk of death from lung cancer by 20 percent in people who are at high-risk.  What are the risks?  There are some risks and limitations of LDCT lung cancer screening. We want to make sure you understand the risks and benefits, so please let us know if you have any questions. Your doctor may want to talk with you more about these risks.    Radiation exposure: As with any exam that uses radiation, there is a very small increased risk of cancer. The amount of radiation in LDCT is small--about the same amount a person would get from a mammogram. Your doctor orders the exam when he or she feels the potential benefits outweigh the risks.    False negatives: No test is perfect, including LDCT. It is possible that you may have a medical condition, including lung cancer, that is not found during your exam. This is called a false negative result.    False positives and more testing: LDCT very often finds  something in the lung that could be cancer, but in fact is not. This is called a false positive result. False positive tests often cause anxiety. To make sure these findings are not cancer, you may need to have more tests. These tests will be done only if you give us permission. Sometimes patients need a treatment that can have side effects, such as a biopsy. For more information on false positives, see  What can I expect from the results?     Findings not related to lung cancer: Your LDCT exam also takes pictures of areas of your body next to your lungs. In a very small number of cases, the CT scan will show an abnormal finding in one of these areas, such as your kidneys, adrenal glands, liver or thyroid. This finding may not be serious, but you may need more tests. Your doctor can help you decide what other tests you may need, if any.  What can I expect from the results?  About 1 out of 4 LDCT exams will find something that may need more tests. Most of the time, these findings are lung nodules. Lung nodules are very small collections of tissue in the lung. These nodules are very common, and the vast majority--more than 97 percent--are not cancer (benign). Most are normal lymph nodes or small areas of scarring from past infections.  But, if a small lung nodule is found to be cancer, the cancer can be cured more than 90 percent of the time. To know if the nodule is cancer, we may need to get more images before your next yearly screening exam. If the nodule has suspicious features (for example, it is large, has an odd shape or grows over time), we will refer you to a specialist for further testing.  Will my doctor also get the results?  Yes. Your doctor will get a copy of your results.  Is it okay to keep smoking now that there s a cancer screening exam?  No. Tobacco is one of the strongest cancer-causing agents. It causes not only lung cancer, but other cancers and cardiovascular (heart) diseases as well. The damage  caused by smoking builds over time. This means that the longer you smoke, the higher your risk of disease. While it is never too late to quit, the sooner you quit, the better.  Where can I find help to quit smoking?  The best way to prevent lung cancer is to stop smoking. If you have already quit smoking, congratulations and keep it up! For help on quitting smoking, please call "[employer]" at 6-376-QUITNOW (1-922.472.5238) or the American Cancer Society at 1-542.186.5860 to find local resources near you.  One-on-one health coaching:  If you d prefer to work individually with a health care provider on tobacco cessation, we offer:      Medication Therapy Management:  Our specially trained pharmacists work closely with you and your doctor to help you quit smoking.  Call [phone] or [phone] (toll free).    Patient Education   Preventive Care Advice   This is general advice given by our system to help you stay healthy. However, your care team may have specific advice just for you. Please talk to your care team about your preventive care needs.  Nutrition  Eat 5 or more servings of fruits and vegetables each day.  Try wheat bread, brown rice and whole grain pasta (instead of white bread, rice, and pasta).  Get enough calcium and vitamin D. Check the label on foods and aim for 100% of the RDA (recommended daily allowance).  Lifestyle  Exercise at least 150 minutes each week  (30 minutes a day, 5 days a week).  Do muscle strengthening activities 2 days a week. These help control your weight and prevent disease.  No smoking.  Wear sunscreen to prevent skin cancer.  Have a dental exam and cleaning every 6 months.  Yearly exams  See your health care team every year to talk about:  Any changes in your health.  Any medicines your care team has prescribed.  Preventive care, family planning, and ways to prevent chronic diseases.  Shots (vaccines)   HPV shots (up to age 26), if you've never had them before.  Hepatitis B  shots (up to age 59), if you've never had them before.  COVID-19 shot: Get this shot when it's due.  Flu shot: Get a flu shot every year.  Tetanus shot: Get a tetanus shot every 10 years.  Pneumococcal, hepatitis A, and RSV shots: Ask your care team if you need these based on your risk.  Shingles shot (for age 50 and up)  General health tests  Diabetes screening:  Starting at age 35, Get screened for diabetes at least every 3 years.  If you are younger than age 35, ask your care team if you should be screened for diabetes.  Cholesterol test: At age 39, start having a cholesterol test every 5 years, or more often if advised.  Bone density scan (DEXA): At age 50, ask your care team if you should have this scan for osteoporosis (brittle bones).  Hepatitis C: Get tested at least once in your life.  STIs (sexually transmitted infections)  Before age 24: Ask your care team if you should be screened for STIs.  After age 24: Get screened for STIs if you're at risk. You are at risk for STIs (including HIV) if:  You are sexually active with more than one person.  You don't use condoms every time.  You or a partner was diagnosed with a sexually transmitted infection.  If you are at risk for HIV, ask about PrEP medicine to prevent HIV.  Get tested for HIV at least once in your life, whether you are at risk for HIV or not.  Cancer screening tests  Cervical cancer screening: If you have a cervix, begin getting regular cervical cancer screening tests starting at age 21.  Breast cancer scan (mammogram): If you've ever had breasts, begin having regular mammograms starting at age 40. This is a scan to check for breast cancer.  Colon cancer screening: It is important to start screening for colon cancer at age 45.  Have a colonoscopy test every 10 years (or more often if you're at risk) Or, ask your provider about stool tests like a FIT test every year or Cologuard test every 3 years.  To learn more about your testing options, visit:    .  For help making a decision, visit:   https://bit.ly/vm95897.  Prostate cancer screening test: If you have a prostate, ask your care team if a prostate cancer screening test (PSA) at age 55 is right for you.  Lung cancer screening: If you are a current or former smoker ages 50 to 80, ask your care team if ongoing lung cancer screenings are right for you.  For informational purposes only. Not to replace the advice of your health care provider. Copyright   2023 [facility]. All rights reserved. Clinically reviewed by the [facility] Transitions Program. Solfo [id number] - REV 01/24.

## 2025-07-10 NOTE — PROGRESS NOTES
"Preventive Care Visit  [facility] [place]  Abraham Adkins DO, Family Medicine  Jul 10, 2025      Assessment & Plan     Routine general medical examination at a health care facility  Health maintenance reviewed and updated. Emphasized importance of balanced diet and regular exercise.      Hyperlipidemia LDL goal <130  Stable, recheck lipid panel and continue simvastatin.  - simvastatin (ZOCOR) 40 MG tablet; Take 1 tablet (40 mg) by mouth at bedtime.  - Lipid panel reflex to direct LDL Fasting; Future    Screen for colon cancer  Due for colon cancer screening    Encounter for screening mammogram for breast cancer  - MA Screen Bilateral w/[place]; Future    Thyroid nodule  Recheck thyroid nodule and thyroid labs.  - US Thyroid; Future  - TSH with free T4 reflex; Future    Screening for deficiency anemia  - CBC with platelets; Future    Screening for diabetes mellitus  - Comprehensive metabolic panel (BMP + Alb, Alk Phos, ALT, AST, Total. Bili, TP); Future  - Hemoglobin A1c; Future    Vitamin D deficiency  - Vitamin D Deficiency; Future    Personal history of tobacco use  - Prof fee: Shared Decision Making for Lung Cancer Screening  - CT Chest Lung Cancer Scrn Low Dose wo; Future  Patient has been advised of split billing requirements and indicates understanding: Yes    Nicotine/Tobacco Cessation  She reports that she has been smoking cigarettes. She has a 42 pack-year smoking history. She has been exposed to tobacco smoke. She has never used smokeless tobacco.  Nicotine/Tobacco Cessation Plan        BMI  Estimated body mass index is 29.13 kg/m  as calculated from the following:    Height as of this encounter: 1.702 m (5' 7\").    Weight as of this encounter: 84.4 kg (186 lb).       Counseling  Appropriate preventive services were addressed with this patient via screening, questionnaire, or discussion as appropriate for fall prevention, nutrition, physical activity, Tobacco-use cessation, social " engagement, weight loss and cognition.  Checklist reviewing preventive services available has been given to the patient.          Michelle Guidry is a 59 year old, presenting for the following:  Physical        7/10/2025     2:46 PM   Additional Questions   Roomed by SALVADOR Chandler CMA   Accompanied by SELF      HPI      Hyperlipidemia Follow-Up    Are you regularly taking any medication or supplement to lower your cholesterol?   Yes- Zocor 40 mg  Are you having muscle aches or other side effects that you think could be caused by your cholesterol lowering medication?  No      Advance Care Planning    Document on file is a Health Care Directive or POLST.        7/7/2025   General Health   How would you rate your overall physical health? Good   Feel stress (tense, anxious, or unable to sleep) Not at all         7/7/2025   Nutrition   Three or more servings of calcium each day? (!) NO   Diet: Regular (no restrictions)   How many servings of fruit and vegetables per day? (!) 0-1   How many sweetened beverages each day? 0-1         7/7/2025   Exercise   Days per week of moderate/strenous exercise 0 days   Average minutes spent exercising at this level 10 min   (!) EXERCISE CONCERN      7/7/2025   Social Factors   Frequency of gathering with friends or relatives Once a week   Worry food won't last until get money to buy more No   Food not last or not have enough money for food? No   Do you have housing? (Housing is defined as stable permanent housing and does not include staying outside in a car, in a tent, in an abandoned building, in an overnight shelter, or couch-surfing.) Yes   Are you worried about losing your housing? No   Lack of transportation? No   Unable to get utilities (heat,electricity)? No         7/7/2025   Fall Risk   Fallen 2 or more times in the past year? No   Trouble with walking or balance? No          7/7/2025   Dental   Dentist two times every year? Yes     Today's PHQ-9 Score:       6/14/2024     1:13  PM 1/23/2025     1:35 AM 7/10/2025     2:49 PM   PHQ   PHQ-9 Total Score 3 0  0   Q9: Thoughts of better off dead/self-harm past 2 weeks Not at all  Not at all Not at all       Patient-reported    Proxy-reported           5/1/2023    10:37 AM 6/14/2024     1:14 PM 7/10/2025     2:49 PM   JONATHAN-7 SCORE   Total Score 0 (minimal anxiety) 0 (minimal anxiety)    Total Score 0 0 0             7/7/2025   Substance Use   If I could quit smoking, I would Completely agree   I want to quit somking, worry about health affects Completely agree   Willing to make a plan to quit smoking Completely agree   Willing to cut down before quitting Completely agree   Alcohol more than 3/day or more than 7/wk No   Do you use any other substances recreationally? No     Social History     Tobacco Use    Smoking status: Every Day     Current packs/day: 1.00     Average packs/day: 1 pack/day for 42.0 years (42.0 ttl pk-yrs)     Types: Cigarettes    Smokeless tobacco: Never   Vaping Use    Vaping status: Never Used   Substance Use Topics    Alcohol use: Yes     Alcohol/week: 6.0 standard drinks of alcohol     Types: 6 Shots of liquor per week     Comment: 1-2 drinks a couple times per week. Sometimes more on weekends    Drug use: No           8/27/2024   LAST FHS-7 RESULTS   1st degree relative breast or ovarian cancer No   Any relative bilateral breast cancer No   Any male have breast cancer No   Any ONE woman have BOTH breast AND ovarian cancer No   Any woman with breast cancer before 50yrs No   2 or more relatives with breast AND/OR ovarian cancer No   2 or more relatives with breast AND/OR bowel cancer Yes        Mammogram Screening - Mammogram every 1-2 years updated in Health Maintenance based on mutual decision making        7/7/2025   STI Screening   New sexual partner(s) since last STI/HIV test? No     History of abnormal Pap smear: No - age 30- 64 PAP with HPV every 5 years recommended        Latest Ref Rng & Units 1/22/2021     3:12 PM  2021     3:11 PM 2021     3:00 PM   PAP / HPV   PAP (Historical)  NIL     NIL  NIL     HPV 16 DNA NEG^Negative  NEG^Negative   Negative     Negative    HPV 18 DNA NEG^Negative  NEG^Negative   Negative     Negative    Other HR HPV NEG^Negative  NEG^Negative   Negative     Negative      ASCVD Risk   The 10-year ASCVD risk score (Nata KEVIN, et al., 2019) is: 5.5%    Values used to calculate the score:      Age: 59 years      Sex: Female      Is Non- : No      Diabetic: No      Tobacco smoker: Yes      Systolic Blood Pressure: 118 mmHg      Is BP treated: No      HDL Cholesterol: 46 mg/dL      Total Cholesterol: 170 mg/dL           Reviewed and updated as needed this visit by Provider                    Past Medical History:   Diagnosis Date    Depressive disorder     Bipolar 2    Endometriosis, site unspecified 10/4/2007    MEDICAL HISTORY OF -     2 Cervix    Other and unspecified hyperlipidemia 2006    RECURR DEPRESSION-FULL REM 10/25/2006    Tobacco use disorder 2006     Past Surgical History:   Procedure Laterality Date    C ANALGESIA,EPIDURAL,LABOR &       COLONOSCOPY  2014    Dr. Estrada [facility]    COLONOSCOPY      [place] [facility]    ESOPHAGOSCOPY, GASTROSCOPY, DUODENOSCOPY (EGD), COMBINED  2014    Procedure: COMBINED ESOPHAGOSCOPY, GASTROSCOPY, DUODENOSCOPY (EGD);  Colonoscopy, & EGD with polypectomy at sigmoid colon using hot snare;  Surgeon: Floyd Estrada MD;  Location: [facility]    HYSTERECTOMY, PAP STILL INDICATED  2007    Laparoscopic Supracervical, benign path    LAPAROSCOPY PROCEDURE UNLISTED      For Endometriosis    SURGICAL HISTORY OF -   2008    Incision of 2 infected cysts-axilla, and abdomen    SURGICAL HISTORY OF -   2010    Removal of Epidermoid Cyst in Rt. Axillary area    THYROID BIOPSY N/A 2024    TUBAL LIGATION           Review of Systems  Constitutional, HEENT, cardiovascular,  "pulmonary, gi and gu systems are negative, except as otherwise noted.     Objective    Exam  LMP 11/22/2006    Estimated body mass index is 29.13 kg/m  as calculated from the following:    Height as of 5/9/25: 1.702 m (5' 7\").    Weight as of 5/9/25: 84.4 kg (186 lb).    Physical Exam  GENERAL: alert and no distress  EYES: Eyes grossly normal to inspection  HENT: ear canals and TM's normal, nose and mouth without ulcers or lesions  NECK: no adenopathy, no asymmetry, masses, or scars  RESP: lungs clear to auscultation - no rales, rhonchi or wheezes  CV: regular rates and rhythm, normal S1 S2, no S3 or S4, and no murmur, click or rub  MS: no gross musculoskeletal defects noted, no edema  SKIN: no suspicious lesions or rashes  PSYCH: mentation appears normal, affect normal/bright    The longitudinal plan of care for the diagnosis(es)/condition(s) as documented were addressed during this visit. Due to the added complexity in care, I will continue to support Brea in the subsequent management and with ongoing continuity of care.      Signed Electronically by: Abraham Adkins, DO      Lung Cancer Screening Shared Decision Making Visit     Brea Aquino, a 59 year old female, is eligible for lung cancer screening    History   Smoking Status    Every Day    Types: Cigarettes   Smokeless Tobacco    Never       I have discussed with patient the risks and benefits of screening for lung cancer with low-dose CT.     The risks include:    radiation exposure: one low dose chest CT has as much ionizing radiation as about 15 chest x-rays, or 6 months of background radiation living in [place]      false positives: most findings/nodules are NOT cancer, but some might still require additional diagnostic evaluation, including biopsy    over-diagnosis: some slow growing cancers that might never have been clinically significant will be detected and treated unnecessarily     The benefit of early detection of lung cancer is contingent " upon adherence to annual screening or more frequent follow up if indicated.     Furthermore, to benefit from screening, Brea must be willing and able to undergo diagnostic procedures, if indicated. Although no specific guide is available for determining severity of comorbidities, it is reasonable to withhold screening in patients who have greater mortality risk from other diseases.     We did discuss that the best way to prevent lung cancer is to not smoke.    Some patients may value a numeric estimation of lung cancer risk when evaluating if lung cancer screening is right for them, here is one calculator:    ShouldIScreen

## 2025-07-15 ENCOUNTER — LAB (OUTPATIENT)
Dept: LAB | Facility: CLINIC | Age: 60
End: 2025-07-15
Payer: COMMERCIAL

## 2025-07-15 DIAGNOSIS — Z13.1 SCREENING FOR DIABETES MELLITUS: ICD-10-CM

## 2025-07-15 DIAGNOSIS — E04.1 THYROID NODULE: ICD-10-CM

## 2025-07-15 DIAGNOSIS — E55.9 VITAMIN D DEFICIENCY: ICD-10-CM

## 2025-07-15 DIAGNOSIS — E78.5 HYPERLIPIDEMIA LDL GOAL <130: ICD-10-CM

## 2025-07-15 DIAGNOSIS — Z13.0 SCREENING FOR DEFICIENCY ANEMIA: ICD-10-CM

## 2025-07-15 LAB
ALBUMIN SERPL BCG-MCNC: 4.2 G/DL (ref 3.5–5.2)
ALP SERPL-CCNC: 72 U/L (ref 40–150)
ALT SERPL W P-5'-P-CCNC: 22 U/L (ref 0–50)
ANION GAP SERPL CALCULATED.3IONS-SCNC: 11 MMOL/L (ref 7–15)
AST SERPL W P-5'-P-CCNC: 33 U/L (ref 0–45)
BILIRUB SERPL-MCNC: 0.3 MG/DL
BUN SERPL-MCNC: 12.2 MG/DL (ref 8–23)
CALCIUM SERPL-MCNC: 9.9 MG/DL (ref 8.8–10.4)
CHLORIDE SERPL-SCNC: 105 MMOL/L (ref 98–107)
CHOLEST SERPL-MCNC: 170 MG/DL
CREAT SERPL-MCNC: 0.75 MG/DL (ref 0.51–0.95)
EGFRCR SERPLBLD CKD-EPI 2021: >90 ML/MIN/1.73M2
ERYTHROCYTE [DISTWIDTH] IN BLOOD BY AUTOMATED COUNT: 12.9 % (ref 10–15)
EST. AVERAGE GLUCOSE BLD GHB EST-MCNC: 105 MG/DL
FASTING STATUS PATIENT QL REPORTED: ABNORMAL
FASTING STATUS PATIENT QL REPORTED: NORMAL
GLUCOSE SERPL-MCNC: 84 MG/DL (ref 70–99)
HBA1C MFR BLD: 5.3 % (ref 0–5.6)
HCO3 SERPL-SCNC: 25 MMOL/L (ref 22–29)
HCT VFR BLD AUTO: 45.7 % (ref 35–47)
HDLC SERPL-MCNC: 52 MG/DL
HGB BLD-MCNC: 15.4 G/DL (ref 11.7–15.7)
LDLC SERPL CALC-MCNC: 104 MG/DL
MCH RBC QN AUTO: 31.4 PG (ref 26.5–33)
MCHC RBC AUTO-ENTMCNC: 33.7 G/DL (ref 31.5–36.5)
MCV RBC AUTO: 93 FL (ref 78–100)
NONHDLC SERPL-MCNC: 118 MG/DL
PLATELET # BLD AUTO: 298 10E3/UL (ref 150–450)
POTASSIUM SERPL-SCNC: 4.4 MMOL/L (ref 3.4–5.3)
PROT SERPL-MCNC: 6.8 G/DL (ref 6.4–8.3)
RBC # BLD AUTO: 4.9 10E6/UL (ref 3.8–5.2)
SODIUM SERPL-SCNC: 141 MMOL/L (ref 135–145)
TRIGL SERPL-MCNC: 72 MG/DL
TSH SERPL DL<=0.005 MIU/L-ACNC: 1.06 UIU/ML (ref 0.3–4.2)
VIT D+METAB SERPL-MCNC: 52 NG/ML (ref 20–50)
WBC # BLD AUTO: 6.6 10E3/UL (ref 4–11)

## 2025-07-15 PROCEDURE — 80061 LIPID PANEL: CPT

## 2025-07-15 PROCEDURE — 36415 COLL VENOUS BLD VENIPUNCTURE: CPT

## 2025-07-15 PROCEDURE — 80053 COMPREHEN METABOLIC PANEL: CPT

## 2025-07-15 PROCEDURE — 84443 ASSAY THYROID STIM HORMONE: CPT

## 2025-07-15 PROCEDURE — 83036 HEMOGLOBIN GLYCOSYLATED A1C: CPT

## 2025-07-15 PROCEDURE — 85027 COMPLETE CBC AUTOMATED: CPT

## 2025-07-15 PROCEDURE — 82306 VITAMIN D 25 HYDROXY: CPT

## 2025-07-22 ENCOUNTER — TELEPHONE (OUTPATIENT)
Dept: GASTROENTEROLOGY | Facility: CLINIC | Age: 60
End: 2025-07-22
Payer: COMMERCIAL

## 2025-07-22 NOTE — TELEPHONE ENCOUNTER
"Endoscopy Scheduling Screen    Caller: patient    Have you had any respiratory illness or flu-like symptoms in the last 10 days?  No    Patient is ACTIVE on [employer].  Inform patient that all appointment instructions will be sent via [employer].    Review patient's insurance for any non participating payor.    Ordering/Referring Provider:     EDGAR GORDILLO      (If ordering provider performs procedure, schedule with ordering provider unless otherwise instructed. )    BMI: Estimated body mass index is 29.13 kg/m  as calculated from the following:    Height as of 7/10/25: 1.702 m (5' 7\").    Weight as of 7/10/25: 84.4 kg (186 lb).     Sedation Ordered  moderate sedation.   If patient BMI > 50 do not schedule in [facility].    If patient BMI > 45 do not schedule at [facility].    Are you taking methadone or Suboxone?  NO, No RN review required.    Have you been diagnosed and are being treated for severe PTSD or severe anxiety?  NO, No RN review required.    Are you taking any prescription medications for pain 3 or more times per week?   NO, No RN review required.    Do you have a history of malignant hyperthermia?  No    (Females) Are you currently pregnant?        Have you been diagnosed or told you have pulmonary hypertension?   No    Do you have an LVAD?  No    Have you been told you have moderate to severe sleep apnea?  No.    Have you been told you have COPD, asthma, or any other lung disease?  No    Has your doctor ordered any cardiac tests like echo, angiogram, stress test, ablation, or EKG, that you have not completed yet?  No    Do you  have a history of any heart conditions?  No     Have you ever had or are you waiting for an organ transplant?  No. Continue scheduling, no site restrictions.    Have you had a stroke or transient ischemic attack (TIA aka \"mini stroke\") in the last 2 years?   No.    Have you been diagnosed with or been told you have cirrhosis of the liver?   No.    Are you currently on dialysis?   No    Do " "you need assistance transferring?   No    BMI: Estimated body mass index is 29.13 kg/m  as calculated from the following:    Height as of 7/10/25: 1.702 m (5' 7\").    Weight as of 7/10/25: 84.4 kg (186 lb).     Is patients BMI > 40 and scheduling location [facility]?  No    Do you take an injectable or oral medication for weight loss or diabetes (excluding insulin)?  No    Do you take the medication Naltrexone?  No    Do you take blood thinners?  No       Prep   Are you currently on dialysis or do you have chronic kidney disease?  No    Do you have a diagnosis of diabetes?  No    Do you have a diagnosis of cystic fibrosis (CF)?  No    On a regular basis do you go 3 -5 days between bowel movements?  No    BMI > 40?  No    Preferred Pharmacy:    [id number] - [place] [address] [facility] [address] [place] [address] & [address] [address]  Phone: [phone] Fax: [phone]      Final Scheduling Details     Procedure scheduled  Colonoscopy    Surgeon:  BLAIR     Date of procedure:  8/22     Pre-OP / PAC:   No - Not required for this site.    Location  [facility] - Per order.    Sedation   Moderate Sedation - Per order.      Patient Reminders:   You will receive a call from a Nurse to review instructions and health history.  This assessment must be completed prior to your procedure.  Failure to complete the Nurse assessment may result in the procedure being cancelled.      On the day of your procedure, please designate an adult(s) who can drive you home stay with you for the next 24 hours. The medicines used in the exam will make you sleepy. You will not be able to drive.      You cannot take public transportation, ride share services, or non-medical taxi service without a responsible caregiver.  Medical transport services are allowed with the requirement that a responsible caregiver will receive you at your destination.  We require that drivers and caregivers are confirmed prior " to your procedure.

## 2025-08-22 ENCOUNTER — HOSPITAL ENCOUNTER (OUTPATIENT)
Facility: CLINIC | Age: 60
Discharge: HOME | End: 2025-08-22
Attending: STUDENT IN AN ORGANIZED HEALTH CARE EDUCATION/TRAINING PROGRAM | Admitting: STUDENT IN AN ORGANIZED HEALTH CARE EDUCATION/TRAINING PROGRAM
Payer: COMMERCIAL

## 2025-08-22 VITALS
TEMPERATURE: 97 F | SYSTOLIC BLOOD PRESSURE: 99 MMHG | BODY MASS INDEX: 27.78 KG/M2 | WEIGHT: 177 LBS | RESPIRATION RATE: 18 BRPM | OXYGEN SATURATION: 97 % | HEIGHT: 67 IN | DIASTOLIC BLOOD PRESSURE: 71 MMHG | HEART RATE: 64 BPM

## 2025-08-22 LAB — COLONOSCOPY: NORMAL

## 2025-08-22 PROCEDURE — 45385 COLONOSCOPY W/LESION REMOVAL: CPT | Performed by: STUDENT IN AN ORGANIZED HEALTH CARE EDUCATION/TRAINING PROGRAM

## 2025-08-22 PROCEDURE — 99153 MOD SED SAME PHYS/QHP EA: CPT | Performed by: STUDENT IN AN ORGANIZED HEALTH CARE EDUCATION/TRAINING PROGRAM

## 2025-08-22 PROCEDURE — G0500 MOD SEDAT ENDO SERVICE >5YRS: HCPCS | Performed by: STUDENT IN AN ORGANIZED HEALTH CARE EDUCATION/TRAINING PROGRAM

## 2025-08-22 PROCEDURE — 250N000011 HC RX IP 250 OP 636: Performed by: STUDENT IN AN ORGANIZED HEALTH CARE EDUCATION/TRAINING PROGRAM

## 2025-08-22 PROCEDURE — 88305 TISSUE EXAM BY PATHOLOGIST: CPT | Mod: TC | Performed by: STUDENT IN AN ORGANIZED HEALTH CARE EDUCATION/TRAINING PROGRAM

## 2025-08-22 PROCEDURE — 258N000003 HC RX IP 258 OP 636: Performed by: STUDENT IN AN ORGANIZED HEALTH CARE EDUCATION/TRAINING PROGRAM

## 2025-08-22 RX ORDER — ONDANSETRON 2 MG/ML
4 INJECTION INTRAMUSCULAR; INTRAVENOUS
Status: DISCONTINUED | OUTPATIENT
Start: 2025-08-22 | End: 2025-08-22 | Stop reason: HOSPADM

## 2025-08-22 RX ORDER — FLUMAZENIL 0.1 MG/ML
0.2 INJECTION, SOLUTION INTRAVENOUS
Status: DISCONTINUED | OUTPATIENT
Start: 2025-08-22 | End: 2025-08-22 | Stop reason: HOSPADM

## 2025-08-22 RX ORDER — ONDANSETRON 2 MG/ML
4 INJECTION INTRAMUSCULAR; INTRAVENOUS EVERY 6 HOURS PRN
Status: DISCONTINUED | OUTPATIENT
Start: 2025-08-22 | End: 2025-08-22 | Stop reason: HOSPADM

## 2025-08-22 RX ORDER — NALOXONE HYDROCHLORIDE 0.4 MG/ML
0.4 INJECTION, SOLUTION INTRAMUSCULAR; INTRAVENOUS; SUBCUTANEOUS
Status: DISCONTINUED | OUTPATIENT
Start: 2025-08-22 | End: 2025-08-22 | Stop reason: HOSPADM

## 2025-08-22 RX ORDER — SIMETHICONE 40MG/0.6ML
133 SUSPENSION, DROPS(FINAL DOSAGE FORM)(ML) ORAL
Status: DISCONTINUED | OUTPATIENT
Start: 2025-08-22 | End: 2025-08-22 | Stop reason: HOSPADM

## 2025-08-22 RX ORDER — NALOXONE HYDROCHLORIDE 0.4 MG/ML
0.2 INJECTION, SOLUTION INTRAMUSCULAR; INTRAVENOUS; SUBCUTANEOUS
Status: DISCONTINUED | OUTPATIENT
Start: 2025-08-22 | End: 2025-08-22 | Stop reason: HOSPADM

## 2025-08-22 RX ORDER — FENTANYL CITRATE 50 UG/ML
25-100 INJECTION, SOLUTION INTRAMUSCULAR; INTRAVENOUS EVERY 5 MIN PRN
Refills: 0 | Status: DISCONTINUED | OUTPATIENT
Start: 2025-08-22 | End: 2025-08-22 | Stop reason: HOSPADM

## 2025-08-22 RX ORDER — ONDANSETRON 4 MG/1
4 TABLET, ORALLY DISINTEGRATING ORAL EVERY 6 HOURS PRN
Status: DISCONTINUED | OUTPATIENT
Start: 2025-08-22 | End: 2025-08-22 | Stop reason: HOSPADM

## 2025-08-22 RX ORDER — ATROPINE SULFATE 0.1 MG/ML
1 INJECTION INTRAVENOUS
Status: DISCONTINUED | OUTPATIENT
Start: 2025-08-22 | End: 2025-08-22 | Stop reason: HOSPADM

## 2025-08-22 RX ORDER — EPINEPHRINE 1 MG/ML
0.1 INJECTION, SOLUTION, CONCENTRATE INTRAVENOUS
Status: DISCONTINUED | OUTPATIENT
Start: 2025-08-22 | End: 2025-08-22 | Stop reason: HOSPADM

## 2025-08-22 RX ORDER — LIDOCAINE 40 MG/G
CREAM TOPICAL
Status: DISCONTINUED | OUTPATIENT
Start: 2025-08-22 | End: 2025-08-22 | Stop reason: HOSPADM

## 2025-08-22 RX ORDER — ONDANSETRON 2 MG/ML
INJECTION INTRAMUSCULAR; INTRAVENOUS PRN
Status: DISCONTINUED | OUTPATIENT
Start: 2025-08-22 | End: 2025-08-22 | Stop reason: HOSPADM

## 2025-08-22 RX ORDER — DIPHENHYDRAMINE HYDROCHLORIDE 50 MG/ML
25-50 INJECTION, SOLUTION INTRAMUSCULAR; INTRAVENOUS
Status: DISCONTINUED | OUTPATIENT
Start: 2025-08-22 | End: 2025-08-22 | Stop reason: HOSPADM

## 2025-08-22 RX ORDER — PROCHLORPERAZINE MALEATE 10 MG
10 TABLET ORAL EVERY 6 HOURS PRN
Status: DISCONTINUED | OUTPATIENT
Start: 2025-08-22 | End: 2025-08-22 | Stop reason: HOSPADM

## 2025-08-22 RX ADMIN — SODIUM CHLORIDE 500 ML: 0.9 INJECTION, SOLUTION INTRAVENOUS at 11:37

## 2025-08-22 RX ADMIN — FENTANYL CITRATE 100 MCG: 50 INJECTION INTRAMUSCULAR; INTRAVENOUS at 11:33

## 2025-08-22 RX ADMIN — MIDAZOLAM 1 MG: 1 INJECTION INTRAMUSCULAR; INTRAVENOUS at 11:40

## 2025-08-22 RX ADMIN — MIDAZOLAM 2 MG: 1 INJECTION INTRAMUSCULAR; INTRAVENOUS at 11:33

## 2025-08-22 ASSESSMENT — ACTIVITIES OF DAILY LIVING (ADL)
ADLS_ACUITY_SCORE: 42

## 2025-08-25 LAB
PATH REPORT.COMMENTS IMP SPEC: NORMAL
PATH REPORT.COMMENTS IMP SPEC: NORMAL
PATH REPORT.FINAL DX SPEC: NORMAL
PATH REPORT.GROSS SPEC: NORMAL
PATH REPORT.MICROSCOPIC SPEC OTHER STN: NORMAL
PATH REPORT.RELEVANT HX SPEC: NORMAL
PHOTO IMAGE: NORMAL

## 2025-08-25 PROCEDURE — 88305 TISSUE EXAM BY PATHOLOGIST: CPT | Mod: 26 | Performed by: PATHOLOGY

## 2025-08-27 ENCOUNTER — PATIENT OUTREACH (OUTPATIENT)
Dept: CARE COORDINATION | Facility: CLINIC | Age: 60
End: 2025-08-27
Payer: COMMERCIAL

## 2025-08-28 PROBLEM — D12.6 TUBULAR ADENOMA OF COLON: Status: ACTIVE | Noted: 2025-08-28

## (undated) DEVICE — ENDO TRAP POLYP QUICK CATCH 710201

## (undated) DEVICE — ENDO SNARE EXACTO COLD 9MM LOOP 2.4MMX230CM 00711115

## (undated) DEVICE — CLIP HEMOSTASIS ASSURANCE W16 MM BX00711884

## (undated) DEVICE — KIT ENDO TURNOVER/PROCEDURE W/CLEAN A SCOPE LINERS 103888

## (undated) RX ORDER — SIMETHICONE 40MG/0.6ML
SUSPENSION, DROPS(FINAL DOSAGE FORM)(ML) ORAL
Status: DISPENSED
Start: 2020-07-24

## (undated) RX ORDER — SIMETHICONE 40MG/0.6ML
SUSPENSION, DROPS(FINAL DOSAGE FORM)(ML) ORAL
Status: DISPENSED
Start: 2025-08-22

## (undated) RX ORDER — ONDANSETRON 2 MG/ML
INJECTION INTRAMUSCULAR; INTRAVENOUS
Status: DISPENSED
Start: 2025-08-22

## (undated) RX ORDER — FENTANYL CITRATE 50 UG/ML
INJECTION, SOLUTION INTRAMUSCULAR; INTRAVENOUS
Status: DISPENSED
Start: 2025-08-22

## (undated) RX ORDER — FENTANYL CITRATE 50 UG/ML
INJECTION, SOLUTION INTRAMUSCULAR; INTRAVENOUS
Status: DISPENSED
Start: 2020-07-24